# Patient Record
Sex: MALE | Race: WHITE | Employment: OTHER | URBAN - METROPOLITAN AREA
[De-identification: names, ages, dates, MRNs, and addresses within clinical notes are randomized per-mention and may not be internally consistent; named-entity substitution may affect disease eponyms.]

---

## 2017-01-10 ENCOUNTER — OFFICE VISIT (OUTPATIENT)
Dept: CARDIOLOGY CLINIC | Facility: CLINIC | Age: 72
End: 2017-01-10

## 2017-01-10 VITALS
HEART RATE: 80 BPM | HEIGHT: 70 IN | BODY MASS INDEX: 24.2 KG/M2 | SYSTOLIC BLOOD PRESSURE: 124 MMHG | WEIGHT: 169 LBS | DIASTOLIC BLOOD PRESSURE: 70 MMHG

## 2017-01-10 DIAGNOSIS — E78.00 HYPERCHOLESTEREMIA: ICD-10-CM

## 2017-01-10 DIAGNOSIS — I10 ESSENTIAL HYPERTENSION: ICD-10-CM

## 2017-01-10 DIAGNOSIS — I25.10 CORONARY ARTERY DISEASE INVOLVING NATIVE CORONARY ARTERY OF NATIVE HEART WITHOUT ANGINA PECTORIS: Primary | ICD-10-CM

## 2017-01-10 PROCEDURE — 99214 OFFICE O/P EST MOD 30 MIN: CPT | Performed by: INTERNAL MEDICINE

## 2017-01-10 PROCEDURE — G0463 HOSPITAL OUTPT CLINIC VISIT: HCPCS | Performed by: INTERNAL MEDICINE

## 2017-01-10 RX ORDER — METOPROLOL SUCCINATE 25 MG/1
25 TABLET, EXTENDED RELEASE ORAL DAILY
Qty: 30 TABLET | Refills: 11 | Status: SHIPPED | OUTPATIENT
Start: 2017-01-10 | End: 2018-01-28

## 2017-01-10 RX ORDER — RAMIPRIL 5 MG/1
5 CAPSULE ORAL DAILY
Qty: 30 CAPSULE | Refills: 11 | Status: SHIPPED | OUTPATIENT
Start: 2017-01-10 | End: 2018-01-28

## 2017-01-10 RX ORDER — FENOFIBRATE 160 MG/1
160 TABLET ORAL DAILY
Qty: 30 TABLET | Refills: 11 | Status: SHIPPED | OUTPATIENT
Start: 2017-01-10 | End: 2018-01-28

## 2017-01-10 RX ORDER — ATORVASTATIN CALCIUM 80 MG/1
80 TABLET, FILM COATED ORAL DAILY
Qty: 30 TABLET | Refills: 11 | Status: SHIPPED | OUTPATIENT
Start: 2017-01-10 | End: 2018-01-28

## 2017-01-10 NOTE — PATIENT INSTRUCTIONS
Stress echocardiogram in April or May  Fasting cholesterol blood test October  Continue same medications

## 2017-01-10 NOTE — PROGRESS NOTES
Carlene Sanchez is a 80year old male. Patient presents with: Follow - Up: Yearly    HPI:   This is a pleasant 73-year-old with coronary disease and prior bypass in 2008 known hypertension and elevated cholesterol.   He presents for follow-up and feels wel feels well otherwise  SKIN: denies any unusual skin lesions or rashes  RESPIRATORY: denies shortness of breath with exertion  CARDIOVASCULAR:See HPI  GI: denies abdominal pain and denies heartburn  NEURO: denies headaches  Remainder of review of systems is MD  1/10/2017  12:04 PM

## 2017-05-03 ENCOUNTER — HOSPITAL ENCOUNTER (OUTPATIENT)
Dept: CV DIAGNOSTICS | Facility: HOSPITAL | Age: 72
Discharge: HOME | End: 2017-05-03
Attending: INTERNAL MEDICINE
Payer: MEDICARE

## 2017-05-03 DIAGNOSIS — I25.10 CORONARY ARTERY DISEASE INVOLVING NATIVE CORONARY ARTERY OF NATIVE HEART WITHOUT ANGINA PECTORIS: ICD-10-CM

## 2017-05-03 PROCEDURE — 93016 CV STRESS TEST SUPVJ ONLY: CPT | Performed by: INTERNAL MEDICINE

## 2017-05-03 PROCEDURE — 93018 CV STRESS TEST I&R ONLY: CPT | Performed by: INTERNAL MEDICINE

## 2017-05-03 PROCEDURE — 93350 STRESS TTE ONLY: CPT

## 2017-05-03 PROCEDURE — 93350 STRESS TTE ONLY: CPT | Performed by: INTERNAL MEDICINE

## 2017-05-03 PROCEDURE — 93017 CV STRESS TEST TRACING ONLY: CPT

## 2018-01-29 RX ORDER — METOPROLOL SUCCINATE 25 MG/1
TABLET, EXTENDED RELEASE ORAL
Qty: 30 TABLET | Refills: 10 | Status: SHIPPED | OUTPATIENT
Start: 2018-01-29 | End: 2018-10-24

## 2018-01-29 RX ORDER — FENOFIBRATE 160 MG/1
TABLET ORAL
Qty: 30 TABLET | Refills: 10 | Status: SHIPPED | OUTPATIENT
Start: 2018-01-29 | End: 2018-10-24

## 2018-01-29 RX ORDER — RAMIPRIL 5 MG/1
CAPSULE ORAL
Qty: 30 CAPSULE | Refills: 10 | Status: SHIPPED | OUTPATIENT
Start: 2018-01-29 | End: 2018-10-24

## 2018-01-29 RX ORDER — ATORVASTATIN CALCIUM 80 MG/1
TABLET, FILM COATED ORAL
Qty: 30 TABLET | Refills: 10 | Status: SHIPPED | OUTPATIENT
Start: 2018-01-29 | End: 2018-10-24

## 2018-02-27 ENCOUNTER — OFFICE VISIT (OUTPATIENT)
Dept: CARDIOLOGY CLINIC | Facility: CLINIC | Age: 73
End: 2018-02-27

## 2018-02-27 VITALS
HEART RATE: 68 BPM | RESPIRATION RATE: 12 BRPM | BODY MASS INDEX: 24 KG/M2 | DIASTOLIC BLOOD PRESSURE: 58 MMHG | WEIGHT: 165 LBS | SYSTOLIC BLOOD PRESSURE: 100 MMHG

## 2018-02-27 DIAGNOSIS — I25.10 CORONARY ARTERY DISEASE INVOLVING NATIVE CORONARY ARTERY OF NATIVE HEART WITHOUT ANGINA PECTORIS: ICD-10-CM

## 2018-02-27 DIAGNOSIS — E78.00 HYPERCHOLESTEREMIA: Primary | ICD-10-CM

## 2018-02-27 DIAGNOSIS — I10 ESSENTIAL HYPERTENSION: ICD-10-CM

## 2018-02-27 PROCEDURE — G0463 HOSPITAL OUTPT CLINIC VISIT: HCPCS | Performed by: INTERNAL MEDICINE

## 2018-02-27 PROCEDURE — 99214 OFFICE O/P EST MOD 30 MIN: CPT | Performed by: INTERNAL MEDICINE

## 2018-02-27 NOTE — PROGRESS NOTES
Alisa Maloney is a 71year old male. Patient presents with:  Prior Authorization    HPI:   This is a pleasant 67-year-old with coronary disease known bypass in 2008 with hypertension and elevated cholesterol who presents for follow-up.   He is feeling wel OF SYSTEMS:   GENERAL HEALTH: feels well otherwise  SKIN: denies any unusual skin lesions or rashes  RESPIRATORY: denies shortness of breath with exertion  CARDIOVASCULAR:See HPI  GI: denies abdominal pain and denies heartburn  NEURO: denies headaches  Rem

## 2018-03-05 ENCOUNTER — OFFICE VISIT (OUTPATIENT)
Dept: FAMILY MEDICINE CLINIC | Facility: CLINIC | Age: 73
End: 2018-03-05

## 2018-03-05 VITALS
WEIGHT: 165 LBS | DIASTOLIC BLOOD PRESSURE: 85 MMHG | HEIGHT: 70 IN | BODY MASS INDEX: 23.62 KG/M2 | TEMPERATURE: 98 F | HEART RATE: 70 BPM | SYSTOLIC BLOOD PRESSURE: 147 MMHG

## 2018-03-05 DIAGNOSIS — IMO0001 ALCOHOLISM /ALCOHOL ABUSE: Primary | ICD-10-CM

## 2018-03-05 PROCEDURE — 99214 OFFICE O/P EST MOD 30 MIN: CPT | Performed by: FAMILY MEDICINE

## 2018-03-05 PROCEDURE — G0463 HOSPITAL OUTPT CLINIC VISIT: HCPCS | Performed by: FAMILY MEDICINE

## 2018-03-05 NOTE — PROGRESS NOTES
Patient ID: Antony Ocampo is a 71year old male. HPI  Patient presents with:  Alcohol Problem    Just saw Dr. Radha Belcher the cardiologist in 2/27/2018. In 2008 he had a bypass as he has high blood pressure and high cholesterol.   He has labs ordered b kg)  06/27/16 : 165 lb (74.8 kg)      Body mass index is 23.68 kg/m².     BP Readings from Last 6 Encounters:  03/05/18 : 147/85  02/27/18 : 100/58  01/10/17 : 124/70  08/16/16 : 138/84  07/11/16 : 136/84  06/27/16 : 128/83        Review of Systems      Pas Normal rate, regular rhythm and normal heart sounds. Pulmonary/Chest: Effort normal and breath sounds normal. No respiratory distress. Abdominal: Normal appearance and bowel sounds are normal. There is    no tenderness.   There is no rigidity, no rebou

## 2018-03-07 ENCOUNTER — LAB ENCOUNTER (OUTPATIENT)
Dept: LAB | Age: 73
End: 2018-03-07
Attending: INTERNAL MEDICINE
Payer: MEDICARE

## 2018-03-07 DIAGNOSIS — E78.00 HYPERCHOLESTEREMIA: ICD-10-CM

## 2018-03-07 DIAGNOSIS — I10 ESSENTIAL HYPERTENSION: ICD-10-CM

## 2018-03-07 DIAGNOSIS — IMO0001 ALCOHOLISM /ALCOHOL ABUSE: ICD-10-CM

## 2018-03-07 DIAGNOSIS — I25.10 CORONARY ARTERY DISEASE INVOLVING NATIVE CORONARY ARTERY OF NATIVE HEART WITHOUT ANGINA PECTORIS: ICD-10-CM

## 2018-03-07 LAB
ALBUMIN SERPL BCP-MCNC: 4.1 G/DL (ref 3.5–4.8)
ALBUMIN/GLOB SERPL: 1.8 {RATIO} (ref 1–2)
ALP SERPL-CCNC: 63 U/L (ref 32–100)
ALT SERPL-CCNC: 37 U/L (ref 17–63)
ANION GAP SERPL CALC-SCNC: 9 MMOL/L (ref 0–18)
AST SERPL-CCNC: 36 U/L (ref 15–41)
BASOPHILS # BLD: 0.1 K/UL (ref 0–0.2)
BASOPHILS NFR BLD: 1 %
BILIRUB SERPL-MCNC: 1 MG/DL (ref 0.3–1.2)
BUN SERPL-MCNC: 11 MG/DL (ref 8–20)
BUN/CREAT SERPL: 11.6 (ref 10–20)
CALCIUM SERPL-MCNC: 9.1 MG/DL (ref 8.5–10.5)
CHLORIDE SERPL-SCNC: 100 MMOL/L (ref 95–110)
CHOLEST SERPL-MCNC: 192 MG/DL (ref 110–200)
CO2 SERPL-SCNC: 26 MMOL/L (ref 22–32)
CREAT SERPL-MCNC: 0.95 MG/DL (ref 0.5–1.5)
EOSINOPHIL # BLD: 0.1 K/UL (ref 0–0.7)
EOSINOPHIL NFR BLD: 1 %
ERYTHROCYTE [DISTWIDTH] IN BLOOD BY AUTOMATED COUNT: 12.5 % (ref 11–15)
FOLATE SERPL-MCNC: 13.6 NG/ML
GGT SERPL-CCNC: 65 U/L (ref 7–50)
GLOBULIN PLAS-MCNC: 2.3 G/DL (ref 2.5–3.7)
GLUCOSE SERPL-MCNC: 123 MG/DL (ref 70–99)
HCT VFR BLD AUTO: 42 % (ref 41–52)
HDLC SERPL-MCNC: 62 MG/DL
HGB BLD-MCNC: 14.4 G/DL (ref 13.5–17.5)
LDLC SERPL CALC-MCNC: 110 MG/DL (ref 0–99)
LYMPHOCYTES # BLD: 1.9 K/UL (ref 1–4)
LYMPHOCYTES NFR BLD: 26 %
MCH RBC QN AUTO: 31.6 PG (ref 27–32)
MCHC RBC AUTO-ENTMCNC: 34.3 G/DL (ref 32–37)
MCV RBC AUTO: 92.1 FL (ref 80–100)
MONOCYTES # BLD: 0.7 K/UL (ref 0–1)
MONOCYTES NFR BLD: 9 %
NEUTROPHILS # BLD AUTO: 4.6 K/UL (ref 1.8–7.7)
NEUTROPHILS NFR BLD: 63 %
NONHDLC SERPL-MCNC: 130 MG/DL
OSMOLALITY UR CALC.SUM OF ELEC: 281 MOSM/KG (ref 275–295)
PATIENT FASTING: YES
PLATELET # BLD AUTO: 380 K/UL (ref 140–400)
PMV BLD AUTO: 8.3 FL (ref 7.4–10.3)
POTASSIUM SERPL-SCNC: 4.2 MMOL/L (ref 3.3–5.1)
PROT SERPL-MCNC: 6.4 G/DL (ref 5.9–8.4)
RBC # BLD AUTO: 4.56 M/UL (ref 4.5–5.9)
SODIUM SERPL-SCNC: 135 MMOL/L (ref 136–144)
TRIGL SERPL-MCNC: 99 MG/DL (ref 1–149)
VIT B12 SERPL-MCNC: 620 PG/ML (ref 181–914)
WBC # BLD AUTO: 7.4 K/UL (ref 4–11)

## 2018-03-07 PROCEDURE — 82607 VITAMIN B-12: CPT

## 2018-03-07 PROCEDURE — 82977 ASSAY OF GGT: CPT

## 2018-03-07 PROCEDURE — 36415 COLL VENOUS BLD VENIPUNCTURE: CPT

## 2018-03-07 PROCEDURE — 82746 ASSAY OF FOLIC ACID SERUM: CPT

## 2018-03-07 PROCEDURE — 84425 ASSAY OF VITAMIN B-1: CPT

## 2018-03-07 PROCEDURE — 80061 LIPID PANEL: CPT

## 2018-03-07 PROCEDURE — 85025 COMPLETE CBC W/AUTO DIFF WBC: CPT

## 2018-03-07 PROCEDURE — 80053 COMPREHEN METABOLIC PANEL: CPT

## 2018-03-08 ENCOUNTER — TELEPHONE (OUTPATIENT)
Dept: CARDIOLOGY CLINIC | Facility: CLINIC | Age: 73
End: 2018-03-08

## 2018-03-08 DIAGNOSIS — I25.10 ATHEROSCLEROSIS OF NATIVE CORONARY ARTERY OF NATIVE HEART WITHOUT ANGINA PECTORIS: Primary | ICD-10-CM

## 2018-03-08 NOTE — TELEPHONE ENCOUNTER
Called patient to inform him to work on diet & exercise and retest lipids in 2 mo.  See lab result note    [facility]

## 2018-03-11 LAB — VITAMIN B1 (THIAMINE), WHOLE B: 116 NMOL/L

## 2018-05-01 ENCOUNTER — HOSPITAL ENCOUNTER (OUTPATIENT)
Dept: GENERAL RADIOLOGY | Age: 73
Discharge: HOME | End: 2018-05-01
Attending: FAMILY MEDICINE | Admitting: FAMILY MEDICINE
Payer: MEDICARE

## 2018-05-01 ENCOUNTER — OFFICE VISIT (OUTPATIENT)
Dept: FAMILY MEDICINE CLINIC | Facility: CLINIC | Age: 73
End: 2018-05-01

## 2018-05-01 VITALS
HEIGHT: 70 IN | BODY MASS INDEX: 23.62 KG/M2 | TEMPERATURE: 98 F | WEIGHT: 165 LBS | DIASTOLIC BLOOD PRESSURE: 87 MMHG | HEART RATE: 85 BPM | SYSTOLIC BLOOD PRESSURE: 156 MMHG

## 2018-05-01 DIAGNOSIS — Z18.33: Primary | ICD-10-CM

## 2018-05-01 DIAGNOSIS — I89.1 LYMPHANGITIS: ICD-10-CM

## 2018-05-01 DIAGNOSIS — Z18.33: ICD-10-CM

## 2018-05-01 PROCEDURE — 99214 OFFICE O/P EST MOD 30 MIN: CPT | Performed by: FAMILY MEDICINE

## 2018-05-01 PROCEDURE — 73130 X-RAY EXAM OF HAND: CPT | Performed by: FAMILY MEDICINE

## 2018-05-01 PROCEDURE — G0463 HOSPITAL OUTPT CLINIC VISIT: HCPCS | Performed by: FAMILY MEDICINE

## 2018-05-01 RX ORDER — NALTREXONE HYDROCHLORIDE 50 MG/1
TABLET, FILM COATED ORAL
Refills: 0 | COMMUNITY
Start: 2018-04-11 | End: 2018-08-21

## 2018-05-01 RX ORDER — CEPHALEXIN 500 MG/1
500 CAPSULE ORAL 3 TIMES DAILY
Qty: 30 CAPSULE | Refills: 0 | Status: SHIPPED | OUTPATIENT
Start: 2018-05-01 | End: 2018-05-11

## 2018-05-01 RX ORDER — SULFAMETHOXAZOLE AND TRIMETHOPRIM 800; 160 MG/1; MG/1
1 TABLET ORAL 2 TIMES DAILY
Qty: 20 TABLET | Refills: 0 | Status: SHIPPED | OUTPATIENT
Start: 2018-05-01 | End: 2018-05-11

## 2018-05-01 NOTE — PROGRESS NOTES
5/1/2018  11:02 AM    Bárbara Alejandra is a 71year old male. Chief complaint(s): Patient presents with:  Foreign Body: possible splint to right hand, painful, red and swelling    HPI:     Bárbara Alejandra primary complaint is regarding as above.      Pa 03/05/2013      Pneumovax 23          03/12/2014      Medications (Active prior to today's visit):    Current Outpatient Prescriptions:  Sulfamethoxazole-TMP -160 MG Oral Tab per tablet Take 1 tablet by mouth 2 (two) times daily.  Disp: Mouth/Throat: Oropharynx is clear and moist and mucous membranes are normal.   Eyes: Conjunctivae are normal. No scleral icterus. Neck: Neck supple. Cardiovascular: Normal rate and regular rhythm.     Pulmonary/Chest:   Clear to ascultation bilaterall capsule (500 mg total) by mouth 3 (three) times daily.            Imaging & Referrals:  Cj Jean MD

## 2018-08-13 ENCOUNTER — NURSE TRIAGE (OUTPATIENT)
Dept: OTHER | Age: 73
End: 2018-08-13

## 2018-08-13 NOTE — TELEPHONE ENCOUNTER
Action Requested: Summary for Provider     []  Critical Lab, Recommendations Needed  [] Need Additional Advice  []   FYI    []   Need Orders  [] Need Medications Sent to Pharmacy  []  Other     SUMMARY: OV scheduled with Dr Bárbara Nj 8/14/18 for further evalu

## 2018-08-14 ENCOUNTER — OFFICE VISIT (OUTPATIENT)
Dept: FAMILY MEDICINE CLINIC | Facility: CLINIC | Age: 73
End: 2018-08-14
Payer: MEDICARE

## 2018-08-14 VITALS
TEMPERATURE: 99 F | WEIGHT: 163 LBS | DIASTOLIC BLOOD PRESSURE: 68 MMHG | BODY MASS INDEX: 23.34 KG/M2 | HEIGHT: 70 IN | SYSTOLIC BLOOD PRESSURE: 106 MMHG | HEART RATE: 83 BPM

## 2018-08-14 DIAGNOSIS — R39.198 SLOW URINARY STREAM: ICD-10-CM

## 2018-08-14 DIAGNOSIS — R97.20 ELEVATED PSA: ICD-10-CM

## 2018-08-14 DIAGNOSIS — R34 DECREASED URINE VOLUME: Primary | ICD-10-CM

## 2018-08-14 PROCEDURE — 99214 OFFICE O/P EST MOD 30 MIN: CPT | Performed by: FAMILY MEDICINE

## 2018-08-14 PROCEDURE — G0463 HOSPITAL OUTPT CLINIC VISIT: HCPCS | Performed by: FAMILY MEDICINE

## 2018-08-14 NOTE — PROGRESS NOTES
Patient ID: Shaila Alvarado is a 68year old male.     HPI  Patient presents with:  Slow Urine Flow    Action Requested: Summary for Provider     []  Critical Lab, Recommendations Needed  [] Need Additional Advice  []   FYI    []   Need Orders  [] Need Me 156/87  03/05/18 : 147/85  02/27/18 : 100/58  01/10/17 : 124/70  08/16/16 : 138/84        Review of Systems   Constitutional: Negative for fever. Gastrointestinal: Negative for nausea and vomiting. Genitourinary: Positive for frequency.  Negative for [facility] distress. Abdominal: Normal appearance and bowel sounds are normal. There is    no tenderness. No bladder dilatation on palpation. Neurological: Patient is alert and oriented to person, place, and time. Skin: Skin is warm and dry.    Psychiatric: Charly Barrientos

## 2018-08-15 ENCOUNTER — TELEPHONE (OUTPATIENT)
Dept: OTHER | Age: 73
End: 2018-08-15

## 2018-08-15 ENCOUNTER — LAB ENCOUNTER (OUTPATIENT)
Dept: LAB | Age: 73
End: 2018-08-15
Attending: FAMILY MEDICINE
Payer: MEDICARE

## 2018-08-15 DIAGNOSIS — I25.10 ATHEROSCLEROSIS OF NATIVE CORONARY ARTERY OF NATIVE HEART WITHOUT ANGINA PECTORIS: ICD-10-CM

## 2018-08-15 DIAGNOSIS — R97.20 ELEVATED PSA: ICD-10-CM

## 2018-08-15 DIAGNOSIS — N30.00 ACUTE CYSTITIS WITHOUT HEMATURIA: Primary | ICD-10-CM

## 2018-08-15 DIAGNOSIS — R34 DECREASED URINE VOLUME: ICD-10-CM

## 2018-08-15 DIAGNOSIS — R39.198 SLOW URINARY STREAM: ICD-10-CM

## 2018-08-15 LAB
ALT SERPL-CCNC: 28 U/L (ref 17–63)
AST SERPL-CCNC: 36 U/L (ref 15–41)
BILIRUB UR QL: NEGATIVE
CHOLEST SERPL-MCNC: 127 MG/DL (ref 110–200)
COLOR UR: YELLOW
GLUCOSE UR-MCNC: NEGATIVE MG/DL
HDLC SERPL-MCNC: 50 MG/DL
KETONES UR-MCNC: NEGATIVE MG/DL
LDLC SERPL CALC-MCNC: 61 MG/DL (ref 0–99)
NITRITE UR QL STRIP.AUTO: NEGATIVE
NONHDLC SERPL-MCNC: 77 MG/DL
PH UR: 5 [PH] (ref 5–8)
PROT UR-MCNC: 100 MG/DL
PSA SERPL-MCNC: 1 NG/ML (ref 0–4)
RBC #/AREA URNS AUTO: 0 /HPF
SP GR UR STRIP: 1.01 (ref 1–1.03)
TRIGL SERPL-MCNC: 79 MG/DL (ref 1–149)
UROBILINOGEN UR STRIP-ACNC: <2
VIT C UR-MCNC: NEGATIVE MG/DL
WBC #/AREA URNS AUTO: 3099 /HPF

## 2018-08-15 PROCEDURE — 84450 TRANSFERASE (AST) (SGOT): CPT

## 2018-08-15 PROCEDURE — 80061 LIPID PANEL: CPT

## 2018-08-15 PROCEDURE — 84460 ALANINE AMINO (ALT) (SGPT): CPT

## 2018-08-15 PROCEDURE — 87086 URINE CULTURE/COLONY COUNT: CPT

## 2018-08-15 PROCEDURE — 84153 ASSAY OF PSA TOTAL: CPT

## 2018-08-15 PROCEDURE — 81001 URINALYSIS AUTO W/SCOPE: CPT

## 2018-08-15 PROCEDURE — 36415 COLL VENOUS BLD VENIPUNCTURE: CPT

## 2018-08-15 RX ORDER — CIPROFLOXACIN 500 MG/1
500 TABLET, FILM COATED ORAL 2 TIMES DAILY
Qty: 20 TABLET | Refills: 0 | Status: SHIPPED | OUTPATIENT
Start: 2018-08-15 | End: 2018-08-25

## 2018-08-15 NOTE — TELEPHONE ENCOUNTER
Pt was inform of  message below and he verbalized understanding.     Notes recorded by Dory Horn DO on 8/15/2018 at 2:27 PM CDT  You have a urinary tract infection.  I will put you on 10 days of antibiotics.  Let us do Cipro twice daily for 10 da

## 2018-08-21 ENCOUNTER — OFFICE VISIT (OUTPATIENT)
Dept: SURGERY | Facility: CLINIC | Age: 73
End: 2018-08-21
Payer: MEDICARE

## 2018-08-21 VITALS
WEIGHT: 162 LBS | DIASTOLIC BLOOD PRESSURE: 70 MMHG | HEIGHT: 70 IN | HEART RATE: 70 BPM | SYSTOLIC BLOOD PRESSURE: 118 MMHG | BODY MASS INDEX: 23.19 KG/M2 | TEMPERATURE: 98 F

## 2018-08-21 DIAGNOSIS — K40.90 RIGHT INGUINAL HERNIA: ICD-10-CM

## 2018-08-21 DIAGNOSIS — N39.0 UTI (URINARY TRACT INFECTION) WITH PYURIA: Primary | ICD-10-CM

## 2018-08-21 PROCEDURE — 99204 OFFICE O/P NEW MOD 45 MIN: CPT | Performed by: UROLOGY

## 2018-08-21 PROCEDURE — G0463 HOSPITAL OUTPT CLINIC VISIT: HCPCS | Performed by: UROLOGY

## 2018-08-21 NOTE — PROGRESS NOTES
[facility] Urology  Initial Office Consultation    HPI:   Shannon Barnes is a 68year old male here today in consultation for recent lower urinary tract symptoms. He was referred by his PCP Dr. Kathy Ozuna.     Patient was initially seen on 8/14/2018 by Past Surgical History:   Procedure Laterality Date   • Cabg  2007    per NG     Family History   Problem Relation Age of Onset   • Lipids Father      Hyperlipidemia; per NG   • Heart Disease Father      per NG   • Diabetes Maternal Grandfather      per Neurological: Negative for light-headedness and headaches.        EXAM:  /70 (BP Location: Right arm, Patient Position: Sitting, Cuff Size: adult)   Pulse 70   Temp 98.2 °F (36.8 °C) (Oral)   Ht 5' 10\" (1.778 m)   Wt 162 lb (73.5 kg)   BMI 23.24 kg resolved symptomatic UTI (despite urine culture showing no growth). He was treated with a 10 day course of p.o. Cipro twice daily. His symptoms have completely resolved and he is back to his normal baseline. I explained to Mr. Keith that given this

## 2018-10-25 RX ORDER — RAMIPRIL 5 MG/1
CAPSULE ORAL
Qty: 90 CAPSULE | Refills: 1 | Status: SHIPPED | OUTPATIENT
Start: 2018-10-25 | End: 2019-03-18

## 2018-10-25 RX ORDER — ATORVASTATIN CALCIUM 80 MG/1
TABLET, FILM COATED ORAL
Qty: 90 TABLET | Refills: 1 | Status: SHIPPED | OUTPATIENT
Start: 2018-10-25 | End: 2019-03-18

## 2018-10-25 RX ORDER — FENOFIBRATE 160 MG/1
TABLET ORAL
Qty: 90 TABLET | Refills: 1 | Status: SHIPPED | OUTPATIENT
Start: 2018-10-25 | End: 2019-03-18

## 2018-10-25 RX ORDER — METOPROLOL SUCCINATE 25 MG/1
TABLET, EXTENDED RELEASE ORAL
Qty: 90 TABLET | Refills: 1 | Status: SHIPPED | OUTPATIENT
Start: 2018-10-25 | End: 2019-03-18

## 2018-10-25 NOTE — TELEPHONE ENCOUNTER
Re: atorvastatin, metoprolol, fenofibrate, ramipril  LOV reviewed. No change in thiese medications. Meets refill protocol criteria. Refill sent.   Cholesterol Medications  Protocol Criteria:  · Appointment scheduled with Cardiology in the past 12 months or Ava Corona,     Office Visit    8 months ago [facility] Cardiology Nohemi Haider MD    Office Visit          Lab Results   Component Value Date     (H) 03/07/2018    BUN 11 03/07/2018    CREATSERUM 0.95 03/07/2

## 2019-01-04 ENCOUNTER — OFFICE VISIT (OUTPATIENT)
Dept: FAMILY MEDICINE CLINIC | Facility: CLINIC | Age: 74
End: 2019-01-04
Payer: MEDICARE

## 2019-01-04 VITALS
TEMPERATURE: 97 F | DIASTOLIC BLOOD PRESSURE: 69 MMHG | HEART RATE: 66 BPM | BODY MASS INDEX: 23.79 KG/M2 | SYSTOLIC BLOOD PRESSURE: 109 MMHG | HEIGHT: 70 IN | WEIGHT: 166.19 LBS

## 2019-01-04 DIAGNOSIS — IMO0001 ALCOHOLISM /ALCOHOL ABUSE: Primary | ICD-10-CM

## 2019-01-04 PROCEDURE — 99214 OFFICE O/P EST MOD 30 MIN: CPT | Performed by: FAMILY MEDICINE

## 2019-01-04 PROCEDURE — G0463 HOSPITAL OUTPT CLINIC VISIT: HCPCS | Performed by: FAMILY MEDICINE

## 2019-01-04 NOTE — PROGRESS NOTES
Patient ID: Xochitl Schultz is a 68year old male. HPI  Patient presents with:  Alcohol Problem    I see him in March 2018.   At that time he states he was escalating his alcohol drinking 6-7 drinks over many hours but he knows this is become excessive History of chickenpox     per NG   • History of fracture of arm     per NG   • History of measles     per NG   • Hx of tonsillitis 1950    per NG   • Other and unspecified hyperlipidemia     per NG       Past Surgical History:   Procedure Laterality Date with them letting them know that he is coming. He will talk to Duane Cockayne today and hopefully get the help that is needed. Referrals (if applicable)  No orders of the defined types were placed in this encounter.         Follow up if symptoms persist.

## 2019-01-22 PROBLEM — F10.20 ALCOHOL USE DISORDER, SEVERE, DEPENDENCE (HCC): Status: ACTIVE | Noted: 2019-01-22

## 2019-01-23 PROBLEM — F10.20 ALCOHOL USE DISORDER, MODERATE, DEPENDENCE (HCC): Status: ACTIVE | Noted: 2019-01-22

## 2019-01-29 NOTE — PROGRESS NOTES
Patient ID: Cecilia Moya is a 68year old male. HPI  Patient presents with:   Follow - Up: alcohol detox  Hearing Problem    He states he was admitted to [facility] due to alcohol abuse and he was think he was going to go into withdrawal but he did Systems      Past Medical History:   Diagnosis Date   • Carotid artery disease ([facility] [address]     per NG   • Coronary artery disease     CABG; per NG   • Essential hypertension    • Gastrointestinal complaints, nonspecific     Bowel problems; per NG   • History of Lymphadenopathy:     Has  no cervical adenopathy. Cardiovascular: Normal rate, regular rhythm and no murmur heard. Pulmonary/Chest: Effort normal and breath sounds normal. No respiratory distress.    Neurological: Patient is alert and oriented to [facility]

## 2019-02-05 ENCOUNTER — OFFICE VISIT (OUTPATIENT)
Dept: OTOLARYNGOLOGY | Facility: CLINIC | Age: 74
End: 2019-02-05
Payer: MEDICARE

## 2019-02-05 VITALS
WEIGHT: 163 LBS | TEMPERATURE: 99 F | SYSTOLIC BLOOD PRESSURE: 157 MMHG | BODY MASS INDEX: 23.34 KG/M2 | DIASTOLIC BLOOD PRESSURE: 97 MMHG | HEIGHT: 70 IN

## 2019-02-05 DIAGNOSIS — H61.23 BILATERAL IMPACTED CERUMEN: Primary | ICD-10-CM

## 2019-02-05 PROCEDURE — G0463 HOSPITAL OUTPT CLINIC VISIT: HCPCS | Performed by: OTOLARYNGOLOGY

## 2019-02-05 PROCEDURE — 99202 OFFICE O/P NEW SF 15 MIN: CPT | Performed by: OTOLARYNGOLOGY

## 2019-02-25 ENCOUNTER — OFFICE VISIT (OUTPATIENT)
Dept: AUDIOLOGY | Facility: CLINIC | Age: 74
End: 2019-02-25
Payer: MEDICARE

## 2019-02-25 ENCOUNTER — TELEPHONE (OUTPATIENT)
Dept: OTOLARYNGOLOGY | Facility: CLINIC | Age: 74
End: 2019-02-25

## 2019-02-25 DIAGNOSIS — H91.90 HEARING LOSS, UNSPECIFIED HEARING LOSS TYPE, UNSPECIFIED LATERALITY: Primary | ICD-10-CM

## 2019-02-25 DIAGNOSIS — H90.3 SENSORINEURAL HEARING LOSS, BILATERAL: Primary | ICD-10-CM

## 2019-02-25 PROCEDURE — 92557 COMPREHENSIVE HEARING TEST: CPT | Performed by: AUDIOLOGIST

## 2019-02-25 PROCEDURE — 92591 HEARING AID EXAM, BOTH EARS: CPT | Performed by: AUDIOLOGIST

## 2019-02-25 PROCEDURE — 92567 TYMPANOMETRY: CPT | Performed by: AUDIOLOGIST

## 2019-02-25 NOTE — PROGRESS NOTES
Jeanine Perez was referred for testing by Dr. Suzi Monahan  [birth year]  [id number]      History    Patient was last seen for an audiological evaluation on 7/30/2014. At that time, it was recommended that he pursue binaural hearing aids.   He management.       Discussed listening strategies and gave list of aural rehab options  Discussed that hearing aid is not able to fix all problems in noise, especially speech noise as well as problems with fast speech or in environments with poor signal to

## 2019-03-11 ENCOUNTER — OFFICE VISIT (OUTPATIENT)
Dept: AUDIOLOGY | Facility: CLINIC | Age: 74
End: 2019-03-11

## 2019-03-11 DIAGNOSIS — H90.3 SENSORINEURAL HEARING LOSS, BILATERAL: Primary | ICD-10-CM

## 2019-03-11 PROCEDURE — V5259 HEARING AID, DIGIT, BIN, ITC: HCPCS | Performed by: AUDIOLOGIST

## 2019-03-11 NOTE — PROGRESS NOTES
Hearing Aid [address] [facility] purchased two hearing aids. The hearing aid fitting was completed by Garfield Multani       Hearing Aid Information       Right    Left   Make: Oticon Make: Oticon   Model: OPN 1 ITC Model: OPN 1  ITC   Serial Nu

## 2019-03-19 NOTE — TELEPHONE ENCOUNTER
Atorvastatin  Fenofibrate  Metoprolol succinate  Ramipril    LOV 2/27/18 no change in these medications  Overdue for office visit other wise meets protocols  Hypertensive Medications  Protocol Criteria:  · Appointment scheduled with Cardiology in the past Medications  Protocol Criteria:  · Appointment scheduled with Cardiology in the past 12 months or in the next 3 months  · ALT & LDL on file in the past 12 months  · ALT result less than 80  · LDL result less than 130   Recent Outpatient Visits            1

## 2019-03-20 RX ORDER — METOPROLOL SUCCINATE 25 MG/1
25 TABLET, EXTENDED RELEASE ORAL
Qty: 90 TABLET | Refills: 1 | Status: SHIPPED | OUTPATIENT
Start: 2019-03-20 | End: 2019-07-04

## 2019-03-20 RX ORDER — FENOFIBRATE 160 MG/1
160 TABLET ORAL
Qty: 90 TABLET | Refills: 1 | Status: SHIPPED | OUTPATIENT
Start: 2019-03-20 | End: 2019-07-04

## 2019-03-20 RX ORDER — ATORVASTATIN CALCIUM 80 MG/1
80 TABLET, FILM COATED ORAL
Qty: 90 TABLET | Refills: 1 | Status: SHIPPED | OUTPATIENT
Start: 2019-03-20 | End: 2019-07-04

## 2019-03-20 RX ORDER — RAMIPRIL 5 MG/1
5 CAPSULE ORAL
Qty: 90 CAPSULE | Refills: 1 | Status: SHIPPED | OUTPATIENT
Start: 2019-03-20 | End: 2019-05-06

## 2019-03-26 ENCOUNTER — OFFICE VISIT (OUTPATIENT)
Dept: AUDIOLOGY | Facility: CLINIC | Age: 74
End: 2019-03-26
Payer: MEDICARE

## 2019-03-26 DIAGNOSIS — H90.3 SENSORINEURAL HEARING LOSS, BILATERAL: Primary | ICD-10-CM

## 2019-03-26 PROCEDURE — 92593 HEARING AID CHECK, BOTH EARS: CPT | Performed by: AUDIOLOGIST

## 2019-03-26 NOTE — PROGRESS NOTES
HEARING AID FOLLOW-UP    Elliott   7/31/1945  [id number]        Hearing Aid Information       Right    Left   Make: Oticon Make: Oticon   Model: OPN 1 ITC Model: OPN 1  ITC   Serial Number: [id number] Serial Number: [id number]   Date of Purchase: 03/11

## 2019-04-16 ENCOUNTER — OFFICE VISIT (OUTPATIENT)
Dept: CARDIOLOGY CLINIC | Facility: CLINIC | Age: 74
End: 2019-04-16
Payer: MEDICARE

## 2019-04-16 VITALS
DIASTOLIC BLOOD PRESSURE: 61 MMHG | WEIGHT: 170 LBS | BODY MASS INDEX: 24 KG/M2 | HEART RATE: 81 BPM | OXYGEN SATURATION: 94 % | SYSTOLIC BLOOD PRESSURE: 91 MMHG | RESPIRATION RATE: 20 BRPM

## 2019-04-16 DIAGNOSIS — I10 ESSENTIAL HYPERTENSION: ICD-10-CM

## 2019-04-16 DIAGNOSIS — E78.00 HYPERCHOLESTEREMIA: ICD-10-CM

## 2019-04-16 DIAGNOSIS — I25.10 ATHEROSCLEROSIS OF NATIVE CORONARY ARTERY OF NATIVE HEART WITHOUT ANGINA PECTORIS: Primary | ICD-10-CM

## 2019-04-16 PROCEDURE — 99214 OFFICE O/P EST MOD 30 MIN: CPT | Performed by: INTERNAL MEDICINE

## 2019-04-16 PROCEDURE — G0463 HOSPITAL OUTPT CLINIC VISIT: HCPCS | Performed by: INTERNAL MEDICINE

## 2019-04-16 NOTE — PROGRESS NOTES
[facility] CLINIC  PROGRESS NOTE    Clavin Flores is a 68year old male. Patient presents with:   Follow - Up  Hypercholesterolemia  CAD  Dyspnea: Mild, occasionally  Dizziness: 1 episode a month ago     HPI:   This is a pleasant 68year old male with coron Packs/day: 1.00        Years: 25.00        Pack years: 25        Types: Cigarettes      Smokeless tobacco: Never Used      Tobacco comment: Quit 15-20 years ago    Alcohol use:  Yes      Alcohol/week: 3.6 - 5.4 oz      Types: 6 - 9 Glasses of wine per week hypertension          In conclusion, this pleasant 68year old male with with coronary disease prior bypass who is overall doing well except for rare atypical shortness of breath. With history would recommend stress echo to further assess.   His lower norm

## 2019-04-16 NOTE — PATIENT INSTRUCTIONS
Schedule stress echocardiogram  Get fasting cholesterol blood test by August  Monitor and record resting blood pressure and pulse after 5 minutes of rest for 1 week and call with results.   When check blood pressure check 3 readings and throw away the first

## 2019-04-23 ENCOUNTER — TELEPHONE (OUTPATIENT)
Dept: AUDIOLOGY | Facility: CLINIC | Age: 74
End: 2019-04-23

## 2019-04-23 NOTE — TELEPHONE ENCOUNTER
Pt is going to be out of town 5/16 through October. States he is not satisfied with new hearing aids, requesting cb also wants appt before 5/16. pls call thank you.

## 2019-04-24 NOTE — TELEPHONE ENCOUNTER
Spoke to patient and fit in appointment on 5/8 at [phone]-- advised appt reminder may say 320, but to please come at [phone]

## 2019-05-04 ENCOUNTER — HOSPITAL ENCOUNTER (OUTPATIENT)
Age: 74
Discharge: HOME | End: 2019-05-04
Attending: EMERGENCY MEDICINE
Payer: MEDICARE

## 2019-05-04 ENCOUNTER — NURSE TRIAGE (OUTPATIENT)
Dept: OTHER | Age: 74
End: 2019-05-04

## 2019-05-04 VITALS
OXYGEN SATURATION: 96 % | HEART RATE: 74 BPM | SYSTOLIC BLOOD PRESSURE: 115 MMHG | DIASTOLIC BLOOD PRESSURE: 65 MMHG | RESPIRATION RATE: 18 BRPM | TEMPERATURE: 98 F | WEIGHT: 160 LBS | HEIGHT: 70 IN | BODY MASS INDEX: 22.9 KG/M2

## 2019-05-04 DIAGNOSIS — T78.3XXA ANGIOEDEMA OF LIPS, INITIAL ENCOUNTER: Primary | ICD-10-CM

## 2019-05-04 PROCEDURE — 99204 OFFICE O/P NEW MOD 45 MIN: CPT

## 2019-05-04 PROCEDURE — 99213 OFFICE O/P EST LOW 20 MIN: CPT

## 2019-05-04 RX ORDER — FAMOTIDINE 20 MG/1
40 TABLET ORAL ONCE
Status: COMPLETED | OUTPATIENT
Start: 2019-05-04 | End: 2019-05-04

## 2019-05-04 RX ORDER — DIPHENHYDRAMINE HCL 25 MG
50 CAPSULE ORAL ONCE
Status: COMPLETED | OUTPATIENT
Start: 2019-05-04 | End: 2019-05-04

## 2019-05-04 RX ORDER — PREDNISONE 20 MG/1
40 TABLET ORAL ONCE
Status: COMPLETED | OUTPATIENT
Start: 2019-05-04 | End: 2019-05-04

## 2019-05-04 RX ORDER — PREDNISONE 20 MG/1
40 TABLET ORAL DAILY
Qty: 6 TABLET | Refills: 0 | Status: ON HOLD | OUTPATIENT
Start: 2019-05-04 | End: 2019-05-06

## 2019-05-04 NOTE — ED PROVIDER NOTES
Patient Seen in: [facility] Immediate Care In [address] [place] [address]    History   Patient presents with:  Swelling Edema (cardiovascular, metabolic)    Stated Complaint: SWOLLEN LIP     HPI    The patient is a 67-year-old male with a history of hypertension, on Oral   SpO2 98 %   O2 Device None (Room air)       Current:/65   Pulse 74   Temp 98.4 °F (36.9 °C) (Oral)   Resp 18   Ht 177.8 cm (5' 10\")   Wt 72.6 kg   SpO2 96%   BMI 22.96 kg/m²         Physical Exam    Alert male no acute distress  HEENT: Normoc (40 mg total) by mouth daily for 3 days. , Normal, Disp-6 tablet, R-0

## 2019-05-04 NOTE — ED NOTES
Has appointment with Rachael Acosta in office 0940 Monday. Prescription given and reviewed. With family. Call 911 for any increased shortness of breath continual swelling tightness in throat mouth tongue.  And new or worse concerns call 911

## 2019-05-04 NOTE — ED NOTES
Upper lip appears more swollen denies tongue edema speech clear. No drooling voice clear.  vvs.  Encouraged to avoid all meds ending in PRIL. Reviewed Ace Inhibitors.  Added to allergy list.

## 2019-05-06 ENCOUNTER — OFFICE VISIT (OUTPATIENT)
Dept: FAMILY MEDICINE CLINIC | Facility: CLINIC | Age: 74
End: 2019-05-06
Payer: MEDICARE

## 2019-05-06 ENCOUNTER — APPOINTMENT (OUTPATIENT)
Dept: ULTRASOUND IMAGING | Facility: HOSPITAL | Age: 74
DRG: 683 | End: 2019-05-06
Attending: EMERGENCY MEDICINE
Payer: MEDICARE

## 2019-05-06 ENCOUNTER — HOSPITAL ENCOUNTER (INPATIENT)
Facility: HOSPITAL | Age: 74
LOS: 9 days | Discharge: NURSING, REHAB OR LONG-TERM CARE | DRG: 683 | End: 2019-05-15
Attending: EMERGENCY MEDICINE | Admitting: HOSPITALIST
Payer: MEDICARE

## 2019-05-06 ENCOUNTER — LAB ENCOUNTER (OUTPATIENT)
Dept: LAB | Age: 74
DRG: 683 | End: 2019-05-06
Attending: FAMILY MEDICINE
Payer: MEDICARE

## 2019-05-06 ENCOUNTER — HOSPITAL ENCOUNTER (OUTPATIENT)
Dept: GENERAL RADIOLOGY | Age: 74
Discharge: HOME | DRG: 683 | End: 2019-05-06
Attending: FAMILY MEDICINE
Payer: MEDICARE

## 2019-05-06 VITALS
HEART RATE: 79 BPM | WEIGHT: 171 LBS | SYSTOLIC BLOOD PRESSURE: 115 MMHG | TEMPERATURE: 97 F | HEIGHT: 70 IN | DIASTOLIC BLOOD PRESSURE: 74 MMHG | BODY MASS INDEX: 24.48 KG/M2

## 2019-05-06 DIAGNOSIS — R14.0 ABDOMINAL DISTENSION: ICD-10-CM

## 2019-05-06 DIAGNOSIS — IMO0001 ALCOHOLISM /ALCOHOL ABUSE: ICD-10-CM

## 2019-05-06 DIAGNOSIS — R39.12 BENIGN PROSTATIC HYPERPLASIA WITH WEAK URINARY STREAM: ICD-10-CM

## 2019-05-06 DIAGNOSIS — F10.10 ALCOHOL ABUSE: ICD-10-CM

## 2019-05-06 DIAGNOSIS — I25.10 ATHEROSCLEROSIS OF NATIVE CORONARY ARTERY OF NATIVE HEART WITHOUT ANGINA PECTORIS: ICD-10-CM

## 2019-05-06 DIAGNOSIS — F41.9 ANXIETY AND DEPRESSION: ICD-10-CM

## 2019-05-06 DIAGNOSIS — N40.1 BENIGN PROSTATIC HYPERPLASIA WITH WEAK URINARY STREAM: ICD-10-CM

## 2019-05-06 DIAGNOSIS — N28.9 ACUTE RENAL INSUFFICIENCY: Primary | ICD-10-CM

## 2019-05-06 DIAGNOSIS — N40.1 BENIGN PROSTATIC HYPERPLASIA WITH LOWER URINARY TRACT SYMPTOMS, SYMPTOM DETAILS UNSPECIFIED: ICD-10-CM

## 2019-05-06 DIAGNOSIS — F32.A ANXIETY AND DEPRESSION: ICD-10-CM

## 2019-05-06 DIAGNOSIS — R22.0 SWELLING OF UPPER LIP: Primary | ICD-10-CM

## 2019-05-06 DIAGNOSIS — R33.8 ACUTE URINARY RETENTION: ICD-10-CM

## 2019-05-06 DIAGNOSIS — G47.00 INSOMNIA, UNSPECIFIED TYPE: ICD-10-CM

## 2019-05-06 DIAGNOSIS — R41.3 MEMORY LOSS: ICD-10-CM

## 2019-05-06 PROCEDURE — 36415 COLL VENOUS BLD VENIPUNCTURE: CPT

## 2019-05-06 PROCEDURE — 82977 ASSAY OF GGT: CPT

## 2019-05-06 PROCEDURE — 99222 1ST HOSP IP/OBS MODERATE 55: CPT | Performed by: UROLOGY

## 2019-05-06 PROCEDURE — G0463 HOSPITAL OUTPT CLINIC VISIT: HCPCS | Performed by: FAMILY MEDICINE

## 2019-05-06 PROCEDURE — 76770 US EXAM ABDO BACK WALL COMP: CPT | Performed by: EMERGENCY MEDICINE

## 2019-05-06 PROCEDURE — 74018 RADEX ABDOMEN 1 VIEW: CPT | Performed by: FAMILY MEDICINE

## 2019-05-06 PROCEDURE — 80061 LIPID PANEL: CPT

## 2019-05-06 PROCEDURE — 85025 COMPLETE CBC W/AUTO DIFF WBC: CPT

## 2019-05-06 PROCEDURE — 51703 INSERT BLADDER CATH COMPLEX: CPT | Performed by: UROLOGY

## 2019-05-06 PROCEDURE — 99215 OFFICE O/P EST HI 40 MIN: CPT | Performed by: FAMILY MEDICINE

## 2019-05-06 PROCEDURE — 99223 1ST HOSP IP/OBS HIGH 75: CPT | Performed by: HOSPITALIST

## 2019-05-06 PROCEDURE — 80053 COMPREHEN METABOLIC PANEL: CPT

## 2019-05-06 PROCEDURE — 82607 VITAMIN B-12: CPT

## 2019-05-06 RX ORDER — TAMSULOSIN HYDROCHLORIDE 0.4 MG/1
0.4 CAPSULE ORAL DAILY
Qty: 90 CAPSULE | Refills: 1 | Status: ON HOLD | OUTPATIENT
Start: 2019-05-06 | End: 2019-05-06

## 2019-05-06 RX ORDER — 0.9 % SODIUM CHLORIDE 0.9 %
VIAL (ML) INJECTION
Status: DISPENSED
Start: 2019-05-06 | End: 2019-05-07

## 2019-05-06 RX ORDER — ACETAMINOPHEN 325 MG/1
650 TABLET ORAL EVERY 6 HOURS PRN
Status: DISCONTINUED | OUTPATIENT
Start: 2019-05-06 | End: 2019-05-15

## 2019-05-06 RX ORDER — SODIUM CHLORIDE 0.9 % (FLUSH) 0.9 %
3 SYRINGE (ML) INJECTION AS NEEDED
Status: DISCONTINUED | OUTPATIENT
Start: 2019-05-06 | End: 2019-05-15

## 2019-05-06 RX ORDER — ESCITALOPRAM OXALATE 10 MG/1
10 TABLET ORAL DAILY
Qty: 90 TABLET | Refills: 0 | Status: ON HOLD | OUTPATIENT
Start: 2019-05-06 | End: 2019-05-06

## 2019-05-06 RX ORDER — FINASTERIDE 5 MG/1
5 TABLET, FILM COATED ORAL DAILY
Status: DISCONTINUED | OUTPATIENT
Start: 2019-05-06 | End: 2019-05-15

## 2019-05-06 RX ORDER — ALFUZOSIN HYDROCHLORIDE 10 MG/1
10 TABLET, EXTENDED RELEASE ORAL
Status: DISCONTINUED | OUTPATIENT
Start: 2019-05-07 | End: 2019-05-15

## 2019-05-06 RX ORDER — ALFUZOSIN HYDROCHLORIDE 10 MG/1
10 TABLET, EXTENDED RELEASE ORAL ONCE
Status: COMPLETED | OUTPATIENT
Start: 2019-05-06 | End: 2019-05-06

## 2019-05-06 RX ORDER — HYDROCODONE BITARTRATE AND ACETAMINOPHEN 5; 325 MG/1; MG/1
1 TABLET ORAL EVERY 6 HOURS PRN
Status: DISCONTINUED | OUTPATIENT
Start: 2019-05-06 | End: 2019-05-15

## 2019-05-06 RX ORDER — LIDOCAINE HYDROCHLORIDE 20 MG/ML
10 JELLY TOPICAL ONCE
Status: COMPLETED | OUTPATIENT
Start: 2019-05-06 | End: 2019-05-06

## 2019-05-06 RX ORDER — SODIUM CHLORIDE 9 MG/ML
INJECTION, SOLUTION INTRAVENOUS CONTINUOUS
Status: DISCONTINUED | OUTPATIENT
Start: 2019-05-06 | End: 2019-05-11

## 2019-05-06 RX ORDER — ONDANSETRON 2 MG/ML
4 INJECTION INTRAMUSCULAR; INTRAVENOUS EVERY 6 HOURS PRN
Status: DISCONTINUED | OUTPATIENT
Start: 2019-05-06 | End: 2019-05-15

## 2019-05-06 RX ORDER — METOPROLOL SUCCINATE 25 MG/1
25 TABLET, EXTENDED RELEASE ORAL
Status: DISCONTINUED | OUTPATIENT
Start: 2019-05-07 | End: 2019-05-08

## 2019-05-06 NOTE — PATIENT INSTRUCTIONS
Please call the psychiatrist she saw at [facility] . get an appointment. Bring your medications with you.

## 2019-05-06 NOTE — ED NOTES
Pt  had an outpt abd XR done today for urinary dribbling.  was told to come to ER for further evaluation for urinary retention.  was told the XR showed bladder distention.  a few weeks ago had UTI that he was treated for.   States hopper

## 2019-05-06 NOTE — PROGRESS NOTES
Spoke with patient, advised to go to ER  And will go to ER [facility] today.     Notes recorded by Jewel Camargo DO on 5/6/2019 at 3:23 PM CDT  You have a very large mass in the pelvis.  This could be a massively distended urinary bladder.  I spoke to the radiol

## 2019-05-06 NOTE — ED PROVIDER NOTES
Patient Seen in: [facility] AND [facility] Emergency Department    History   Patient presents with:  Urinary Symptoms (urologic)    Stated Complaint:     HPI    23-year-old male presents for difficulty urinating for the past few weeks.   He was seen by his primar reviewed. All other systems reviewed and negative except as noted above.     Physical Exam     ED Triage Vitals [05/06/19 1547]   BP (!) 172/93   Pulse 70   Resp 20   Temp 97.3 °F (36.3 °C)   Temp src Temporal   SpO2 98 %   O2 Device None (Room air) -----------         ------                     CBC W/ DIFFERENTIAL[[id number]]          Abnormal            Final result                 Please view results for these tests on the individual orders.    [address] distended urinary bladder? 2. Atherosclerosis. 3. Demineralization. 4. Osteoarthritis. 5. Transitional vertebra. 6. Postop changes in the chest. 7. This report was communicated by telephone to Dr. Sarah Sutherland at the dictation time shown below.      Dictat

## 2019-05-06 NOTE — ED INITIAL ASSESSMENT (HPI)
Presents s/p output xray today showing massively distended urinary bladder. Pt c/o \"urinary dribbling\" for past few weeks.

## 2019-05-06 NOTE — PROGRESS NOTES
Patient ID: Sandra Pichardo is a 68year old male.     HPI  Patient presents with:  Depression  Swelling: upper lip ; follow up for urgent care  Incontinence    HPI and MDM from UC visit on 5/4/19     HPI  The patient is a 72-year-old male with a history drinking wine and drinks every day. Reports that his drinking is better and worse depending on day. The amount that he drinks every day changes.   He saw a psychiatrist in the past and reports that the medication did not help but he was told that there are kg/m²  01/29/19 : 23.50 kg/m²  01/04/19 : 23.85 kg/m²      BP Readings from Last 6 Encounters:  05/06/19 : 115/74  05/04/19 : 115/65  04/16/19 : 91/61  02/05/19 : (!) 157/97  01/29/19 : 140/70  01/04/19 : 109/69        Review of Systems   Constitutional: P mouth once daily. Disp: 90 tablet Rfl: 1   multivitamin Oral Tab Take 1 tablet by mouth daily. Disp: 30 tablet Rfl: 0   aspirin  MG Oral Tab EC Take 325 mg by mouth daily.    Disp:  Rfl:      Allergies:  Ramipril                ANGIOEDEMA    Comment:S depression  -     escitalopram 10 MG Oral Tab; Take 1 tablet (10 mg total) by mouth daily. For mood  Lets start Lexapro. He clearly has anxiety and depression  Alcohol abuse  -     escitalopram 10 MG Oral Tab; Take 1 tablet (10 mg total) by mouth daily.  Arthor Bernheim Requested Specialty:UROLOGY          Number of Visits Requested:3      Ronny Jones  5/6/2019    By signing my name below, I, Ronny Jones,  attest that this documentation has been prepared under the direction and in the presence of Sanjuanita Maloney DO.

## 2019-05-07 ENCOUNTER — AUDIOLOGY DOCUMENTATION (OUTPATIENT)
Dept: AUDIOLOGY | Facility: CLINIC | Age: 74
End: 2019-05-07

## 2019-05-07 ENCOUNTER — TELEPHONE (OUTPATIENT)
Dept: OTHER | Age: 74
End: 2019-05-07

## 2019-05-07 ENCOUNTER — TELEPHONE (OUTPATIENT)
Dept: AUDIOLOGY | Facility: CLINIC | Age: 74
End: 2019-05-07

## 2019-05-07 PROBLEM — R33.8 ACUTE URINARY RETENTION: Status: RESOLVED | Noted: 2019-05-06 | Resolved: 2019-05-07

## 2019-05-07 PROCEDURE — 99231 SBSQ HOSP IP/OBS SF/LOW 25: CPT | Performed by: UROLOGY

## 2019-05-07 PROCEDURE — 99233 SBSQ HOSP IP/OBS HIGH 50: CPT | Performed by: HOSPITALIST

## 2019-05-07 RX ORDER — PANTOPRAZOLE SODIUM 40 MG/1
40 TABLET, DELAYED RELEASE ORAL
Status: DISCONTINUED | OUTPATIENT
Start: 2019-05-07 | End: 2019-05-15

## 2019-05-07 RX ORDER — MORPHINE SULFATE 2 MG/ML
INJECTION, SOLUTION INTRAMUSCULAR; INTRAVENOUS
Status: DISPENSED
Start: 2019-05-07 | End: 2019-05-07

## 2019-05-07 RX ORDER — MORPHINE SULFATE 2 MG/ML
2 INJECTION, SOLUTION INTRAMUSCULAR; INTRAVENOUS EVERY 4 HOURS PRN
Status: DISCONTINUED | OUTPATIENT
Start: 2019-05-07 | End: 2019-05-15

## 2019-05-07 RX ORDER — ATROPA BELLADONNA AND OPIUM 16.2; 6 MG/1; MG/1
1 SUPPOSITORY RECTAL EVERY 8 HOURS PRN
Status: DISCONTINUED | OUTPATIENT
Start: 2019-05-07 | End: 2019-05-15

## 2019-05-07 RX ORDER — 0.9 % SODIUM CHLORIDE 0.9 %
VIAL (ML) INJECTION
Status: DISPENSED
Start: 2019-05-07 | End: 2019-05-07

## 2019-05-07 NOTE — TELEPHONE ENCOUNTER
Spoke to Roxana Randiroopa. After discussion, offered option to return for credit- less the return fee. She is aware and will return aids by end of day today. He may revisit hearing aid use once he is feeling better and/or back from [place].

## 2019-05-07 NOTE — PLAN OF CARE
Problem: Patient Centered Care  Goal: Patient preferences are identified and integrated in the patient's plan of care  Description  Interventions:  - What would you like us to know as we care for you?  I have been experiencing a weak stream of urine and \ scan as needed  - Follow urinary retention protocol/standard of care  - Consider collaborating with pharmacy to review patient's medication profile  - Implement strategies to promote bladder emptying  Outcome: Progressing     Problem: METABOLIC/FLUID AND E urinary retention and enlarged prostate. Chery inserted in ED tonight and patient had 4800 cc of urine out after insertion. Urine is dark red with small clots Patient had several traumatic insertion attempts in the ED.  US of kidney showed moderated bilate

## 2019-05-07 NOTE — CONSULTS
[facility] Urology  Report of Initial Consultation    Elliott Coon Patient Status:  Inpatient    1945 MRN [id number]   Location [facility] 1W Attending Jessee Andrews MD   Hosp Day # 0 PCP Arturo Patel TONSILLECTOMY       Family History  Family History   Problem Relation Age of Onset   • Lipids Father         Hyperlipidemia; per NG   • Heart Disease Father         per NG   • Diabetes Maternal Grandfather         per NG   • Cancer Paternal Grandfather No acute distress. Alert and oriented x 3  HEENT: Moist mucous membranes  Respiratory: Breathing comfortably  Cardiovascular: S1, S2  Abdomen: soft, bladder evidently distended to above the umbilicus. Mild TTP. Genitourinary: Circumcised male.  Bilateral resolved. Recommendations:  1. Keep finley in place. 2. Monitor for post-obstructive diuresis. 3. Start Uroxatral and Proscar daily. 4. Renal / Bladder ultrasound. 5. Trend serial BMP Q12 hours.    6. Will require further outpatient evaluation and m

## 2019-05-07 NOTE — ED NOTES
Orders for admission, patient is aware of plan and ready to go upstairs. Any questions, please call ED SUZANNA Boone  at extension [phone]. Pt is alert and oriented. Pt was found to have distended bladder on an Xray he had as an outpt today.  We were unsucc

## 2019-05-07 NOTE — TELEPHONE ENCOUNTER
Pt is currently in the hospital but is supposed to see tahmina monet tomorrow for hearing aid issues. Patient is going out town the 16th as long as he is okd. He would like to be seen by audio before this so he can having hearing aid issue resolved.  HERBERTH signed

## 2019-05-07 NOTE — H&P
[facility]    PATIENT'S NAME: Nanda Mcallister   ATTENDING PHYSICIAN: Melissa Hood MD   PATIENT ACCOUNT#:   [id number]    LOCATION:  [place] [address]  MEDICAL RECORD #:   [id number]       YOB: 1945  ADMISSION DATE:       05/06/ poor historian. He reports that he has been having difficulty with urination for the last 2 weeks.   He had a urinary tract infection treated around 2 weeks ago, but for the last 2 to 3 days he has been having fullness in his lower abdomen with weak stream insertion in house. Further recommendations to follow.     Dictated By Adrienne De Guzman MD  d: 05/06/2019 18:59:04  t: 05/06/2019 19:11:33  [facility] [id number]  /

## 2019-05-07 NOTE — TELEPHONE ENCOUNTER
Dr Mariscal=FYI, patient admitted at [address] [place] [address] yesterday.     Please reply to drew: CHRISTOPH Galvez

## 2019-05-07 NOTE — PROGRESS NOTES
[facility]  Urology Consult Follow-Up Note    Marina Myersi Patient Status:  Inpatient    1945 MRN [id number]   Location [facility] 1W Attending Robi Chou MD   Hosp Day # 1 PCP FRANCOIS Membreno on 5/06/2019 at 14:45     Approved by (CST): Kamla Pedraza MD on 5/06/2019 at 14:57          Assessment and Plan:   68year old male with acute urinary retention and ALTA. - Renal parameters improving.  Creatinine down to 2.3 this AM    - Gross hemat

## 2019-05-07 NOTE — PROGRESS NOTES
[facility]    Progress Note    Marina Borden Patient Status:  Inpatient    1945 MRN [id number]   Location [facility] 1W Attending Robi Chou MD   Hosp Day # 1 PCP Gretchen Ridley DO        Subjective:     [facility] ago  No recurrent symptoms, pt is physically active  - aspirin currently on hold d/t hematuria      DVT: SCDs, ambulation  CODE: Full   DISPO: pending        Results:     Lab Results   Component Value Date    WBC 5.3 05/07/2019    HGB 10.1 (L) 05/07/2019

## 2019-05-08 PROCEDURE — 99233 SBSQ HOSP IP/OBS HIGH 50: CPT | Performed by: HOSPITALIST

## 2019-05-08 PROCEDURE — 90792 PSYCH DIAG EVAL W/MED SRVCS: CPT | Performed by: OTHER

## 2019-05-08 PROCEDURE — 99231 SBSQ HOSP IP/OBS SF/LOW 25: CPT | Performed by: UROLOGY

## 2019-05-08 RX ORDER — POTASSIUM CHLORIDE 20 MEQ/1
40 TABLET, EXTENDED RELEASE ORAL EVERY 4 HOURS
Status: COMPLETED | OUTPATIENT
Start: 2019-05-08 | End: 2019-05-08

## 2019-05-08 RX ORDER — HALOPERIDOL 5 MG/ML
1 INJECTION INTRAMUSCULAR EVERY 2 HOUR PRN
Status: DISCONTINUED | OUTPATIENT
Start: 2019-05-08 | End: 2019-05-15

## 2019-05-08 RX ORDER — LORAZEPAM 1 MG/1
1 TABLET ORAL
Status: DISCONTINUED | OUTPATIENT
Start: 2019-05-08 | End: 2019-05-15

## 2019-05-08 RX ORDER — FOLIC ACID 1 MG/1
1 TABLET ORAL DAILY
Status: DISCONTINUED | OUTPATIENT
Start: 2019-05-08 | End: 2019-05-15

## 2019-05-08 RX ORDER — QUETIAPINE 25 MG/1
50 TABLET, FILM COATED ORAL NIGHTLY
Status: DISCONTINUED | OUTPATIENT
Start: 2019-05-08 | End: 2019-05-15

## 2019-05-08 RX ORDER — MELATONIN
100 DAILY
Status: DISCONTINUED | OUTPATIENT
Start: 2019-05-09 | End: 2019-05-15

## 2019-05-08 RX ORDER — LORAZEPAM 1 MG/1
2 TABLET ORAL
Status: DISCONTINUED | OUTPATIENT
Start: 2019-05-08 | End: 2019-05-15

## 2019-05-08 RX ORDER — MULTIPLE VITAMINS W/ MINERALS TAB 9MG-400MCG
1 TAB ORAL DAILY
Status: DISCONTINUED | OUTPATIENT
Start: 2019-05-08 | End: 2019-05-15

## 2019-05-08 RX ORDER — MAGNESIUM OXIDE 400 MG (241.3 MG MAGNESIUM) TABLET
800 TABLET ONCE
Status: COMPLETED | OUTPATIENT
Start: 2019-05-08 | End: 2019-05-08

## 2019-05-08 RX ORDER — CHLORDIAZEPOXIDE HYDROCHLORIDE 25 MG/1
25 CAPSULE, GELATIN COATED ORAL 3 TIMES DAILY
Status: DISCONTINUED | OUTPATIENT
Start: 2019-05-08 | End: 2019-05-11

## 2019-05-08 RX ORDER — 0.9 % SODIUM CHLORIDE 0.9 %
VIAL (ML) INJECTION
Status: COMPLETED
Start: 2019-05-08 | End: 2019-05-08

## 2019-05-08 RX ORDER — LORAZEPAM 2 MG/ML
2 INJECTION INTRAMUSCULAR
Status: DISCONTINUED | OUTPATIENT
Start: 2019-05-08 | End: 2019-05-15

## 2019-05-08 RX ORDER — LORAZEPAM 2 MG/ML
1 INJECTION INTRAMUSCULAR
Status: DISCONTINUED | OUTPATIENT
Start: 2019-05-08 | End: 2019-05-15

## 2019-05-08 NOTE — PLAN OF CARE
Problem: Patient Centered Care  Goal: Patient preferences are identified and integrated in the patient's plan of care  Description  Interventions:  - What would you like us to know as we care for you?  I have been experiencing a weak stream of urine and \ scan as needed  - Follow urinary retention protocol/standard of care  - Consider collaborating with pharmacy to review patient's medication profile  - Implement strategies to promote bladder emptying  Outcome: Progressing     Problem: METABOLIC/FLUID AND E Skin check completed. Urine in finley tube clear yellow. Up with 2 assist and walker. Bed alarm on. Fall precautions in place. Pt reminded to use call light. Pt agitated, attempting to pull out finley catheter and get out of bed.  Frequent rounding by staff,

## 2019-05-08 NOTE — OCCUPATIONAL THERAPY NOTE
OCCUPATIONAL THERAPY EVALUATION - INPATIENT     Room Number: 106/106-A  Evaluation Date: 5/8/2019  Type of Evaluation: Initial  Presenting Problem: acute renal insufficiency    Physician Order: IP Consult to Occupational Therapy  Reason for Therapy: ADL/IA management to ensure he has an eye on it all times to prevent pulling of the catheter and to manage slack on line; pt verbalized understanding and will benefit from continued education from RN staff for management at home.  Patient is on track to d/c to [place] Surgical History:   Procedure Laterality Date   • CABG  2007    per NG   • CATARACT     • TONSILLECTOMY         HOME SITUATION  Type of Home: House  Home Layout: Multi-level  Lives With: Spouse     Toilet and Equipment: Standard height toilet  Shower/Tub a CI    FUNCTIONAL TRANSFER ASSESSMENT  Supine to Sit : Supervision  Sit to Stand: Contact guard assist  Toilet Transfer: SBA  Bedroom Mobility: SBA-CGA     BALANCE ASSESSMENT  Static Sitting: Good  Dynamic Sitting: Fair+  Static Standing: Fair  Dynamic Horacio

## 2019-05-08 NOTE — PHYSICAL THERAPY NOTE
PHYSICAL THERAPY EVALUATION - INPATIENT     Room Number: 106/106-A  Evaluation Date: 5/8/2019  Type of Evaluation: Initial   Physician Order: PT Eval and Treat    Presenting Problem: urinary retention with Acute renal insufficiency --memory loss / ABD dis TUG 2 12. 2 sec . Pt able to amb in room and shorter distance with no AD close SBA to CGA if pt distracted or with turns. Therapy did trial use of RW for longer distance of 240 ft x 1 with SBA . RW did appear to help with pt stability and safety .  Pt but prior to therapy documenation , therapy follow and reassessment will be needed for updated d/c recommendations.    D/c plans pending pt progress , further acute management and support in home at d/c .  SW to work with pt and pt family on optimal d/c kristal per NG   • Cataract     cataract surgery    • Coronary artery disease     CABG; per NG   • Essential hypertension    • Gastrointestinal complaints, nonspecific     Bowel problems; per NG   • Hearing impairment    • High blood pressure    • High choleste solutions    RANGE OF MOTION AND STRENGTH ASSESSMENT    Pt presents with mild generalized weakness as noted by unsteady gait , pt appears shaky  Pt LE ROM grossly WFL   Pt UE ROM grossly WFL             BALANCE  Static Sitting: Good  Dynamic Sitting: Good steps , pt education for one step at a time , pt attempting reciprocol step climbing even after cues and education , pt with one LOB on stairs with min assist needed .   if pt complaint with one step at a time CGA needed     Patient End of Session: Up in ch

## 2019-05-08 NOTE — PROGRESS NOTES
[facility]    Progress Note    Carlene Sanchez Patient Status:  Inpatient    1945 MRN [id number]   Location [facility] 1W Attending Kimberly Novak MD   Hosp Day # 2 PCP DO Benjamin Sutton is warm and dry. Psychiatric: He has a normal mood and affect.            Assessment and Plan:   Acute urinary retention   This is most likely related to enlarged prostate  - s/p finley insertion by urology after multiple attempts prior  - continue uroxatr

## 2019-05-09 PROCEDURE — 99233 SBSQ HOSP IP/OBS HIGH 50: CPT | Performed by: OTHER

## 2019-05-09 PROCEDURE — 99232 SBSQ HOSP IP/OBS MODERATE 35: CPT | Performed by: HOSPITALIST

## 2019-05-09 RX ORDER — METOPROLOL TARTRATE 5 MG/5ML
5 INJECTION INTRAVENOUS ONCE
Status: COMPLETED | OUTPATIENT
Start: 2019-05-09 | End: 2019-05-09

## 2019-05-09 RX ORDER — MAGNESIUM OXIDE 400 MG (241.3 MG MAGNESIUM) TABLET
800 TABLET ONCE
Status: COMPLETED | OUTPATIENT
Start: 2019-05-09 | End: 2019-05-09

## 2019-05-09 NOTE — PROGRESS NOTES
[facility] - [facility]    Progress Note    Wendy Fairbanks Patient Status:  Inpatient    1945 MRN [id number]   Location [facility] 1W Attending Caryn Murillo MD   Hosp Day # 3 PCP [address] [place] [address],            Attending Addendum Alcohol dependence with withdrawal   - CIWA- 9+ overnight  - Psych following  - started on librium, seroquel, and metoprolol  - sitter for safety- will try soft mitt restraints      Acute renal failure  D/t urinary retention  Bilateral hydronephrosis o

## 2019-05-09 NOTE — PLAN OF CARE
Problem: Patient Centered Care  Goal: Patient preferences are identified and integrated in the patient's plan of care  Description  Interventions:  - What would you like us to know as we care for you?  I have been experiencing a weak stream of urine and \ scan as needed  - Follow urinary retention protocol/standard of care  - Consider collaborating with pharmacy to review patient's medication profile  - Implement strategies to promote bladder emptying  Outcome: Progressing     Problem: METABOLIC/FLUID AND E will remain free from self-harm  Description  INTERVENTIONS:  - Apply the least restrictive restraint to prevent harm  - Notify patient and family of reasons restraints applied  - Assess for any contributing factors to confusion (electrolyte disturbances,

## 2019-05-09 NOTE — CONSULTS
[facility]    Report of Consultation    Adiel Carmona Patient Status:  Inpatient    1945 MRN [id number]   Location [facility] 5SW/SE Attending Ian Del Rio MD   Hosp Day # 2 PCP Dilcia Mitchell DO     Date of Admission: denied any auditory or visual hallucination although patient has been exhibiting response to internal stimuli and delirious.         Medical History:       Past Medical History  Past Medical History:   Diagnosis Date   • Alcohol dependence ([facility] [address]    • Dixie Nikolay Thiamine HCl tab 100 mg 100 mg Oral Daily   multivitamin with minerals (ADULT) tab 1 tablet 1 tablet Oral Daily   folic acid (FOLVITE) tab 1 mg 1 mg Oral Daily   chlordiazePOXIDE HCl (LIBRIUM) cap 25 mg 25 mg Oral TID   QUEtiapine Fumarate (SEROQUEL) tab 5 05/08/2019     05/08/2019     05/08/2019    CO2 25.0 05/08/2019    CO2 25.0 05/08/2019    GLU 87 05/08/2019    CA 7.6 (L) 05/08/2019    ALB 4.4 05/06/2019    ALKPHO 87 05/06/2019    TP 7.6 05/06/2019    AST 66 (H) 05/06/2019    ALT 43 05/06/201 and severity. The patient with history of alcoholism who has a presented with renal insufficiency with high BUN/creatinine and withdrawal and delirium syndrome. At this point, I would recommend the following approach:     1.   Focus on education and suppo

## 2019-05-09 NOTE — PROGRESS NOTES
[facility]  Urology Consult Follow-Up Note    Elia Katerinalast Patient Status:  Inpatient    1945 MRN [id number]   Location [facility] 1W Attending Enid Hinojosa MD   Hosp Day # 2 PCP FRANCOIS Johnson likely due to detrusor bleeding following relief of urinary retention. This is usually self limited and has almost resolved. H/H reviewed. Stabilized. 1. Continue finley catheter.  Patient will be discharged home with the catheter and seen in the office

## 2019-05-09 NOTE — CM/SW NOTE
SW contacted pt's wife/Flavia to discuss d/c planning. Pt lives at home w/ wife. Wife stated that pt does not use DME or current w/ any services. Wife stated pt is typically independent w/ ADL's and drives.  Wife stated that pt will need outpt resources for

## 2019-05-10 PROCEDURE — 99232 SBSQ HOSP IP/OBS MODERATE 35: CPT | Performed by: HOSPITALIST

## 2019-05-10 PROCEDURE — 99233 SBSQ HOSP IP/OBS HIGH 50: CPT | Performed by: OTHER

## 2019-05-10 RX ORDER — DIVALPROEX SODIUM 250 MG/1
250 TABLET, DELAYED RELEASE ORAL 2 TIMES DAILY
Status: DISCONTINUED | OUTPATIENT
Start: 2019-05-10 | End: 2019-05-15

## 2019-05-10 RX ORDER — POTASSIUM CHLORIDE 14.9 MG/ML
20 INJECTION INTRAVENOUS ONCE
Status: COMPLETED | OUTPATIENT
Start: 2019-05-10 | End: 2019-05-10

## 2019-05-10 RX ORDER — MAGNESIUM OXIDE 400 MG (241.3 MG MAGNESIUM) TABLET
800 TABLET ONCE
Status: COMPLETED | OUTPATIENT
Start: 2019-05-10 | End: 2019-05-10

## 2019-05-10 NOTE — BH PROGRESS NOTE
Behavioral Health Note  CHIEF COMPLAINT  Obstructive uropathy, acute renal failure, and urinary retention     REASON FOR ADMISSION  Obstructive uropathy, acute renal failure, and urinary retention     SOCIAL HISTORY  Emanuel William lives at home with his wife and

## 2019-05-10 NOTE — OCCUPATIONAL THERAPY NOTE
Discussed with RN Stephanie Lock regarding patient's change in status; pt is currently on CIWA and 1:1 with sitter; asked RN for new orders when patient is appropriate for reassessment.      Jeannie Ruiz, OTR/L   [address] [place] [address]

## 2019-05-10 NOTE — PROGRESS NOTES
[facility]    Progress Note    Shannon Barnes Patient Status:  Inpatient    1945 MRN [id number]   Location [facility] 1W Attending Lexa Agosto MD   Hosp Day # 4 PCP Deysi Min, DO           Attending Addendum dependence with withdrawal   - CIWA  - Psych following  - started on librium, seroquel, and metoprolol  - sitter for safety  - no restraints at this time     Acute renal failure  D/t urinary retention  Bilateral hydronephrosis on ultrasound  - continue IVF

## 2019-05-10 NOTE — PROGRESS NOTES
Antony Ocampo is a 68year old   male with history of hypertension, hyperlipidemia, GERD and alcoholism who presented to the hospital with obstructive uropathy and found with acute renal insufficiency.   The patient seen today for over 35 Procedure Laterality Date   • CABG  2007    per NG   • CATARACT     • TONSILLECTOMY        Family History   Problem Relation Age of Onset   • Lipids Father         Hyperlipidemia; per NG   • Heart Disease Father         per NG   • Diabetes Maternal [place] 650 mg Oral Q6H PRN   ondansetron HCl (ZOFRAN) injection 4 mg 4 mg Intravenous Q6H PRN   Alfuzosin HCl ER (UROXATRAL) 24 hr tab 10 mg 10 mg Oral Daily with breakfast   finasteride (PROSCAR) tab 5 mg 5 mg Oral Daily   HYDROcodone-acetaminophen (Londell Jules) 5-325 severity. The patient with history of alcoholism who has a presented with renal insufficiency with high BUN/creatinine and withdrawal and delirium syndrome. At this point, I would recommend the following approach:      1. Focus on education and support.

## 2019-05-11 PROCEDURE — 99233 SBSQ HOSP IP/OBS HIGH 50: CPT | Performed by: HOSPITALIST

## 2019-05-11 PROCEDURE — 99233 SBSQ HOSP IP/OBS HIGH 50: CPT | Performed by: OTHER

## 2019-05-11 RX ORDER — POTASSIUM CHLORIDE 20 MEQ/1
40 TABLET, EXTENDED RELEASE ORAL ONCE
Status: COMPLETED | OUTPATIENT
Start: 2019-05-11 | End: 2019-05-11

## 2019-05-11 RX ORDER — CHLORDIAZEPOXIDE HYDROCHLORIDE 10 MG/1
10 CAPSULE, GELATIN COATED ORAL 4 TIMES DAILY PRN
Status: DISCONTINUED | OUTPATIENT
Start: 2019-05-11 | End: 2019-05-15

## 2019-05-11 RX ORDER — MAGNESIUM OXIDE 400 MG (241.3 MG MAGNESIUM) TABLET
800 TABLET ONCE
Status: COMPLETED | OUTPATIENT
Start: 2019-05-11 | End: 2019-05-11

## 2019-05-11 NOTE — PHYSICAL THERAPY NOTE
PHYSICAL THERAPY EVALUATION - INPATIENT     Room Number: 556/556-A  Evaluation Date: 5/11/2019  Type of Evaluation: Re-evaluation   Physician Order: PT Eval and Treat    Presenting Problem: urinary retention with Acute renal insufficiency --memory loss / a few days on strength, pt and wife verbalized understanding. Pt wishes to go on his trip to [place], disappointed. Provided active listening and encouragement, discussed rehab expectations, pt verbalized understanding.  Pt left seated in bedside chair, all • High cholesterol    • History of chickenpox     per NG   • History of fracture of arm     per NG   • History of measles     per NG   • Hx of tonsillitis 1950    per NG   • Other and unspecified hyperlipidemia     per NG       Past Surgical History  Pas 4/5    BALANCE  Static Sitting: Poor +  Dynamic Sitting: Poor  Static Standing: Poor  Dynamic Standing: Poor    ACTIVITY TOLERANCE  Pre-activity, supine:  SPO2 98% on 2L  HR 89 bpm  BP /64 mmHg    Post-activity, seated:  SPO2 100% on room air  HR 1 questions and concerns addressed; Alarm set; Family present    CURRENT GOALS    Goals to be met by: 5/25/19  Patient Goal Patient's self-stated goal is: to get stronger; be independent again   Goal #1 Patient is able to demonstrate supine - sit EOB @ level:

## 2019-05-11 NOTE — PROGRESS NOTES
Sandra Pichardo is a 68year old   male with history of hypertension, hyperlipidemia, GERD and alcoholism who presented to the hospital with obstructive uropathy and found with acute renal insufficiency.   The patient seen today for over 35 Tobacco Use      Smoking status: Former Smoker        Packs/day: 1.00        Years: 25.00        Pack years: 25        Types: Cigarettes      Smokeless tobacco: Never Used      Tobacco comment: Quit 15-20 years ago    Alcohol use: Yes      Alcohol/week: 1. Data:  Lab Results   Component Value Date    WBC 5.7 05/09/2019    HGB 10.5 (L) 05/09/2019    HCT 31.2 (L) 05/09/2019    .0 (L) 05/09/2019    CREATSERUM 1.37 (H) 05/10/2019    BUN 16 05/10/2019     05/10/2019    K 3.7 05/10/2019     05/1 changing Librium to PRN for withdrawal.  8.  Follow-up during this admission    Orders This Visit:  Orders Placed This Encounter      Urinalysis with Culture Reflex STAT      CBC With Differential With Platelet      Basic Metabolic Panel (8)      Magnesium

## 2019-05-11 NOTE — PLAN OF CARE
Problem: CARDIOVASCULAR - ADULT  Goal: Maintains optimal cardiac output and hemodynamic stability  Description  INTERVENTIONS:  - Monitor vital signs, rhythm, and trends  - Monitor for bleeding, hypotension and signs of decreased cardiac output  - Evalua ADULT  Goal: Maintains hematologic stability  Description  INTERVENTIONS  - Assess for signs and symptoms of bleeding or hemorrhage  - Monitor labs and vital signs for trends  - Administer supportive blood products/factors, fluids and medications as ordere

## 2019-05-11 NOTE — PROGRESS NOTES
[facility] [facility] - [facility]  Hospitalist Progress Note     Shaila Alvarado Patient Status:  Inpatient    1945  68year old [facility] [id number]   Location 556/556-A Attending Jalen Huizar MD   Hosp Day # 5 PCP Asa Jain DO     ASSESSMENT/PLAN    Ashish Avila 33.2 32.7 32.8   MCHC 33.9 33.4 33.7   RDW 11.5 11.5 11.4   NEPRELIM 4.34 4.02 3.98   WBC 5.3 5.4 5.7   .0* 136.0* 131.0*     Recent Labs   Lab 05/06/19  1058  05/08/19  0419  05/09/19  0648 05/10/19  0627 05/10/19  1521   GLU 97   < > 87  --  90 97 to 2 days depending on how he responds. I discussed with them at length regarding alcohol cessation.   \

## 2019-05-12 PROCEDURE — 99233 SBSQ HOSP IP/OBS HIGH 50: CPT | Performed by: HOSPITALIST

## 2019-05-12 PROCEDURE — 99233 SBSQ HOSP IP/OBS HIGH 50: CPT | Performed by: OTHER

## 2019-05-12 RX ORDER — MAGNESIUM OXIDE 400 MG (241.3 MG MAGNESIUM) TABLET
800 TABLET ONCE
Status: COMPLETED | OUTPATIENT
Start: 2019-05-12 | End: 2019-05-12

## 2019-05-12 RX ORDER — POTASSIUM CHLORIDE 20 MEQ/1
40 TABLET, EXTENDED RELEASE ORAL ONCE
Status: COMPLETED | OUTPATIENT
Start: 2019-05-12 | End: 2019-05-12

## 2019-05-12 NOTE — PROGRESS NOTES
[place] [facility] - [facility]  Hospitalist Progress Note     Mazin Letitiading Patient Status:  Inpatient    1945  68year old CSN [id number]   Location 556/556-A Attending Miguel Su MD   Hosp Day # 6 PCP Kris Jimenez DO     ASSESSMENT/PLAN    Naila Terry peripheral edema. Skin: Skin is warm and dry. No rashes, erythema, diaphoresis. Psych: Normal mood and affect.  Calm, cooperative    Labs:  Recent Labs   Lab 05/07/19  0539 05/08/19  0419 05/09/19  0648   RBC 3.04* 2.97* 3.20*   HGB 10.1* 9.7* 10.5*   HC Alkaloids-Opium, Normal Saline Flush, acetaminophen, ondansetron HCl, HYDROcodone-acetaminophen    >45min spent, >50% spent counseling and coordinating care in the form of educating pt/family and d/w consultants and staff.   I discussed with patient and wif

## 2019-05-12 NOTE — PLAN OF CARE
Problem: CARDIOVASCULAR - ADULT  Goal: Maintains optimal cardiac output and hemodynamic stability  Description  INTERVENTIONS:  - Monitor vital signs, rhythm, and trends  - Monitor for bleeding, hypotension and signs of decreased cardiac output  - Evalua prevent harm  - Notify patient and family of reasons restraints applied  - Assess for any contributing factors to confusion (electrolyte disturbances, delirium, medications)  - Discontinue any unnecessary medical devices as soon as possible  - Assess the p

## 2019-05-12 NOTE — PROGRESS NOTES
Marina Borden is a 68year old   male with history of hypertension, hyperlipidemia, GERD and alcoholism who presented to the hospital with obstructive uropathy and found with acute renal insufficiency.   The patient seen today for over 35 • TONSILLECTOMY        Family History   Problem Relation Age of Onset   • Lipids Father         Hyperlipidemia; per NG   • Heart Disease Father         per NG   • Diabetes Maternal Grandfather         per NG   • Cancer Paternal Grandfather         per NG injection 4 mg 4 mg Intravenous Q6H PRN   Alfuzosin HCl ER (UROXATRAL) 24 hr tab 10 mg 10 mg Oral Daily with breakfast   finasteride (PROSCAR) tab 5 mg 5 mg Oral Daily   HYDROcodone-acetaminophen (NORCO) 5-325 MG per tab 1 tablet 1 tablet Oral Q6H PRN delirium syndrome. At this point, I would recommend the following approach:      1. Focus on education and support. 2.  Psychotherapy focusing on insight orientation and encourage cooperation. 3.  Discontinue detox protocol. 4.  Discontinue sitter.

## 2019-05-12 NOTE — PROGRESS NOTES
Shannon Barnes is a 68year old   male with history of hypertension, hyperlipidemia, GERD and alcoholism who presented to the hospital with obstructive uropathy and found with acute renal insufficiency.   The patient seen today for over 35 Maternal Grandfather         per NG   • Cancer Paternal Grandfather         per NG      Social History: Social History    Tobacco Use      Smoking status: Former Smoker        Packs/day: 1.00        Years: 25.00        Pack years: 25        Types: Cigarett HYDROcodone-acetaminophen (NORCO) 5-325 MG per tab 1 tablet 1 tablet Oral Q6H PRN       Allergies:    Ramipril                ANGIOEDEMA    Comment:Swelling to upper lip.     Laboratory Data:  Lab Results   Component Value Date    WBC 5.7 05/09/2019    HG encourage cooperation. 3.  Continue detox protocol. 4.  Change Librium to 10 mg 4 times daily as needed for withdrawal.  5.  Continue Depakote 250 mg twice daily. 6.  Continue Seroquel 50 mg nightly.   7.  Follow-up during this admission    Orders This V

## 2019-05-13 PROCEDURE — 99232 SBSQ HOSP IP/OBS MODERATE 35: CPT | Performed by: HOSPITALIST

## 2019-05-13 PROCEDURE — 99233 SBSQ HOSP IP/OBS HIGH 50: CPT | Performed by: OTHER

## 2019-05-13 RX ORDER — MAGNESIUM OXIDE 400 MG (241.3 MG MAGNESIUM) TABLET
800 TABLET ONCE
Status: COMPLETED | OUTPATIENT
Start: 2019-05-13 | End: 2019-05-13

## 2019-05-13 RX ORDER — POTASSIUM CHLORIDE 20 MEQ/1
40 TABLET, EXTENDED RELEASE ORAL ONCE
Status: COMPLETED | OUTPATIENT
Start: 2019-05-13 | End: 2019-05-13

## 2019-05-13 NOTE — CM/SW NOTE
Recommendations are for SNF. SW contacted wife/Al to discuss. Mariajose Logloria states she is agreeable, but pt is not. Wife requested for SW to email SNF list to her to start reviewing - SNF list sent via email.  Wife stated she would be to hospital later and

## 2019-05-13 NOTE — OCCUPATIONAL THERAPY NOTE
OCCUPATIONAL THERAPY ReEVALUATION - INPATIENT     Room Number: 556/556-A  Evaluation Date: 5/13/2019  Type of Evaluation: Re-evaluation  Presenting Problem: acute renal insufficiency    Physician Order: IP Consult to Occupational Therapy  Reason for [facility]) with call light in reach and all needs met. Alarm on. Notified RN. DISCHARGE RECOMMENDATIONS  OT Discharge Recommendations: Sub-acute rehabilitation  OT Device Recommendations: None    PLAN  OT Treatment Plan: Balance activities; Energy conservation/wor risk    PAIN ASSESSMENT  Ratin       COGNITION  Orientation Level:  oriented to place, oriented to situation and oriented to person  MiniCog: patient score 3/5    RANGE OF MOTION   Upper extremity ROM is within functional limits     STRENGTH ASSESSMENT with SBA  Comment:        Goals  on:   Frequency: 3-5x a week    [facility] MOT/R  [address] [place] [address]  #[id number]

## 2019-05-13 NOTE — PROGRESS NOTES
[facility]  Hospitalist Progress Note     Khrisvandana Quinonesker Patient Status:  Inpatient    1945  68year old CSN [id number]   Location 556/556-A Attending Mandeep Clifford MD   Hosp Day # 7 PCP Marietta Hutchins, DO     ASSESSMENT/PLAN    Leotha Leyden Behaving normally. Moving all 4 extremities. MS: No joint effusions. No peripheral edema. Skin: Skin is warm and dry. No rashes, erythema, diaphoresis. Psych: Normal mood and affect.  Calm, cooperative    Labs:  Recent Labs   Lab 05/08/19  0419 05/09/

## 2019-05-13 NOTE — PHYSICAL THERAPY NOTE
PHYSICAL THERAPY TREATMENT NOTE - INPATIENT     Room Number: 556/556-A       Presenting Problem: urinary retention with Acute renal insufficiency --memory loss / ABD distention --BPH hematuria .        Problem List  Principal Problem:    Acute renal insuffi impairment may benefit from rehab stay. Pt with improved mobility today, still requires MIN-MOD A for mobility d/t poor balance and impaired safety awareness. Pt is HIGH FALL RISK and is NOT SAFE for d/c home at this time.  PT recommendation sub-acute rehab (AM-PAC Scale): 40.78   CMS Modifier (G-Code): CK    FUNCTIONAL ABILITY STATUS  Gait Assessment   Gait Assistance: Minimum assistance; Moderate assistance  Distance (ft): 75' x 1  Assistive Device: Rolling walker  Pattern: Shuffle(mild path deviations, impa

## 2019-05-13 NOTE — PLAN OF CARE
Problem: CARDIOVASCULAR - ADULT  Goal: Maintains optimal cardiac output and hemodynamic stability  Description  INTERVENTIONS:  - Monitor vital signs, rhythm, and trends  - Monitor for bleeding, hypotension and signs of decreased cardiac output  - Evalua ADULT  Goal: Maintains hematologic stability  Description  INTERVENTIONS  - Assess for signs and symptoms of bleeding or hemorrhage  - Monitor labs and vital signs for trends  - Administer supportive blood products/factors, fluids and medications as ordere position. Call light within reach and bed alarm on. Monitoring continues.

## 2019-05-13 NOTE — BH PROGRESS NOTE
Behavioral Health Note  CHIEF COMPLAINT  Obstructive uropathy, acute renal failure, and urinary retention     REASON FOR ADMISSION  Obstructive uropathy, acute renal failure, and urinary retention     SOCIAL HISTORY  Dasia Solo lives at home with his wife and ASSESSMENT  No SI/HI  ASSESSMENT  The patient has a dx alcohol withdrawal with delirium, r/o mood disorder.      PLAN  1. Psych consult completed. 2. Referrals left at bedside, however patient is not interested in sobriety.    3. Case discussed with Dr. Arnold Chan

## 2019-05-14 PROCEDURE — 99233 SBSQ HOSP IP/OBS HIGH 50: CPT | Performed by: OTHER

## 2019-05-14 PROCEDURE — 99232 SBSQ HOSP IP/OBS MODERATE 35: CPT | Performed by: HOSPITALIST

## 2019-05-14 RX ORDER — DIVALPROEX SODIUM 250 MG/1
250 TABLET, DELAYED RELEASE ORAL 2 TIMES DAILY
Refills: 0 | Status: SHIPPED | COMMUNITY
Start: 2019-05-14 | End: 2019-06-10

## 2019-05-14 RX ORDER — MELATONIN
100 DAILY
Qty: 30 TABLET | Refills: 0 | Status: SHIPPED | OUTPATIENT
Start: 2019-05-15 | End: 2019-08-23

## 2019-05-14 RX ORDER — PANTOPRAZOLE SODIUM 40 MG/1
40 TABLET, DELAYED RELEASE ORAL
Qty: 30 TABLET | Refills: 0 | Status: SHIPPED | OUTPATIENT
Start: 2019-05-15 | End: 2019-06-10

## 2019-05-14 RX ORDER — FOLIC ACID 1 MG/1
1 TABLET ORAL DAILY
Qty: 30 TABLET | Refills: 0 | Status: SHIPPED | OUTPATIENT
Start: 2019-05-15 | End: 2019-08-23

## 2019-05-14 RX ORDER — ALFUZOSIN HYDROCHLORIDE 10 MG/1
10 TABLET, EXTENDED RELEASE ORAL
Qty: 30 TABLET | Refills: 0 | Status: SHIPPED | OUTPATIENT
Start: 2019-05-15 | End: 2019-08-07

## 2019-05-14 RX ORDER — MAGNESIUM OXIDE 400 MG (241.3 MG MAGNESIUM) TABLET
400 TABLET ONCE
Status: COMPLETED | OUTPATIENT
Start: 2019-05-14 | End: 2019-05-14

## 2019-05-14 RX ORDER — QUETIAPINE 50 MG/1
50 TABLET, FILM COATED ORAL NIGHTLY
Refills: 0 | Status: SHIPPED | COMMUNITY
Start: 2019-05-14 | End: 2019-06-10

## 2019-05-14 RX ORDER — FINASTERIDE 5 MG/1
5 TABLET, FILM COATED ORAL DAILY
Qty: 30 TABLET | Refills: 0 | Status: SHIPPED | OUTPATIENT
Start: 2019-05-15 | End: 2019-08-07

## 2019-05-14 RX ORDER — SODIUM CHLORIDE 9 MG/ML
INJECTION, SOLUTION INTRAVENOUS CONTINUOUS
Status: DISPENSED | OUTPATIENT
Start: 2019-05-14 | End: 2019-05-15

## 2019-05-14 NOTE — PLAN OF CARE
Patient made multiple attempts to get out of bed. Pt reoriented and agrees to stay in bed. Referrals made to [facility] and [place].  Wife agreeable to rehab, pt not able to make appropriate decisions on own      Problem: Patient Centered Care  Goal: Patient prefere Progressing     Problem: GENITOURINARY - ADULT  Goal: Absence of urinary retention  Description  INTERVENTIONS:  - Assess patient?s ability to void and empty bladder  - Monitor intake/output and perform bladder scan as needed  - Follow urinary retention pr venipuncture sites to achieve adequate hemostasis  - Assess for signs and symptoms of internal bleeding  - Monitor lab trends  Outcome: Progressing     Problem: Safety Risk - Non-Violent Restraints  Goal: Patient will remain free from self-harm  [address]cript[address] 98

## 2019-05-14 NOTE — PROGRESS NOTES
Criss Smith is a 68year old   male with history of hypertension, hyperlipidemia, GERD and alcoholism who presented to the hospital with obstructive uropathy and found with acute renal insufficiency.   The patient seen today for over 35 History    Tobacco Use      Smoking status: Former Smoker        Packs/day: 1.00        Years: 25.00        Pack years: 25        Types: Cigarettes      Smokeless tobacco: Never Used      Tobacco comment: Quit 15-20 years ago    Alcohol use:  Yes      Alcoh ANGIOEDEMA    Comment:Swelling to upper lip.     Laboratory Data:  Lab Results   Component Value Date    WBC 5.7 05/13/2019    HGB 9.8 (L) 05/13/2019    HCT 29.5 (L) 05/13/2019    .0 05/13/2019    CREATSERUM 1.69 (H) 05/13/2019    BUN 24 (H) [facility]    Orders This Visit:  Orders Placed This Encounter      Urinalysis with Culture Reflex STAT      CBC With Differential With Platelet      Basic Metabolic Panel (8)      Magnesium      Basic Metabolic Panel (8)      CBC With Differential

## 2019-05-14 NOTE — PROGRESS NOTES
[facility]    Progress Note    Ana María Grieve Patient Status:  Inpatient    1945 MRN [id number]   Location [facility] 5SW/SE Attending Kenny Sims MD   Hosp Day # 8 PCP Shabbir Medrano DO           Attending Addendum Kristy Zhou advised that he needs to go rehab and going home is not an option.     Urinary retention. Resolved with Chery catheter.  -Outpatient urology follow-up for voiding trial     Gross hematuria. Due to rapid decompression of the bladder.   Resolved.     Acute

## 2019-05-14 NOTE — CM/SW NOTE
SW met w/ pt and pt's wife to follow up on d/c plans. Per psych, pt is not able to make own decisions and recommendation is for SNF. Wife requesting referrals to [address] [place] [address]. Sw placed referrals.     Judy Share, [address]

## 2019-05-14 NOTE — PHYSICAL THERAPY NOTE
PHYSICAL THERAPY TREATMENT NOTE - INPATIENT     Room Number: 556/556-A       Presenting Problem: urinary retention with Acute renal insufficiency --memory loss / ABD distention --BPH hematuria .        Problem List  Principal Problem:    Acute renal insuffi (including adjusting bedclothes, sheets and blankets)?: A Little   -   Sitting down on and standing up from a chair with arms (e.g., wheelchair, bedside commode, etc.): A Little   -   Moving from lying on back to sitting on the side of the bed?: A Little demonstrate independence with home activity/exercise instructions provided to patient in preparation for discharge.    Goal #5   Current Status In progress    Goal #6     Goal #6  Current Status

## 2019-05-15 ENCOUNTER — APPOINTMENT (OUTPATIENT)
Dept: ULTRASOUND IMAGING | Facility: HOSPITAL | Age: 74
DRG: 683 | End: 2019-05-15
Attending: HOSPITALIST
Payer: MEDICARE

## 2019-05-15 VITALS
BODY MASS INDEX: 23.91 KG/M2 | OXYGEN SATURATION: 100 % | TEMPERATURE: 98 F | HEART RATE: 72 BPM | DIASTOLIC BLOOD PRESSURE: 68 MMHG | HEIGHT: 70 IN | WEIGHT: 167 LBS | SYSTOLIC BLOOD PRESSURE: 149 MMHG | RESPIRATION RATE: 18 BRPM

## 2019-05-15 PROCEDURE — 99233 SBSQ HOSP IP/OBS HIGH 50: CPT | Performed by: OTHER

## 2019-05-15 PROCEDURE — 99239 HOSP IP/OBS DSCHRG MGMT >30: CPT | Performed by: HOSPITALIST

## 2019-05-15 PROCEDURE — 76775 US EXAM ABDO BACK WALL LIM: CPT | Performed by: HOSPITALIST

## 2019-05-15 RX ORDER — POTASSIUM CHLORIDE 20 MEQ/1
40 TABLET, EXTENDED RELEASE ORAL ONCE
Status: COMPLETED | OUTPATIENT
Start: 2019-05-15 | End: 2019-05-15

## 2019-05-15 RX ORDER — MAGNESIUM OXIDE 400 MG (241.3 MG MAGNESIUM) TABLET
400 TABLET 2 TIMES DAILY WITH MEALS
Qty: 60 TABLET | Refills: 0 | Status: SHIPPED
Start: 2019-05-15 | End: 2019-08-23

## 2019-05-15 RX ORDER — MAGNESIUM OXIDE 400 MG (241.3 MG MAGNESIUM) TABLET
400 TABLET 2 TIMES DAILY WITH MEALS
Status: DISCONTINUED | OUTPATIENT
Start: 2019-05-15 | End: 2019-05-15

## 2019-05-15 RX ORDER — MAGNESIUM OXIDE 400 MG (241.3 MG MAGNESIUM) TABLET
400 TABLET ONCE
Status: COMPLETED | OUTPATIENT
Start: 2019-05-15 | End: 2019-05-15

## 2019-05-15 NOTE — CM/SW NOTE
[facility] unable to accept, [facility] able to accept    [facility] agreeable w/ 1p d/c time  RN aware of d/c time  Wife & pt aware of d/c time. Pt hesitant to go to rehab, but agreeable. Wife to transport pt over to facility.     Ky Swanson 50 # [phone]    Rosalyn Lesches

## 2019-05-15 NOTE — DISCHARGE SUMMARY
[facility]    Discharge Summary    Calvin Flores Patient Status:  Inpatient    1945 MRN [id number]   Location [facility] 5SW/SE Attending Fuad Varghese MD   Hosp Day # 9 PCP Jake Abdi DO     Date of Admission: creatinine of 2.85; GFR 21, which is way below his baseline. CBC was unremarkable; hemoglobin is 11, around his baseline. Urinalysis unremarkable. Attempts were made to insert a Chery catheter in the emergency room, with no success.   Ultrasound of the b Medications      START taking these medications      Instructions Prescription details   Alfuzosin HCl ER 10 MG Tb24  Commonly known as:  UROXATRAL      Take 1 tablet (10 mg total) by mouth daily with breakfast.   Quantity:  30 tablet  Refills:  0     diva [phone]   · Alfuzosin HCl ER 10 MG Tb24  · finasteride 5 MG Tabs  · folic acid 1 MG Tabs  · Pantoprazole Sodium 40 MG Tbec  · Thiamine HCl 100 MG Tabs           Discharge Instructions       Chery on d/c- keep until removed by urologist (1-2 weeks for

## 2019-05-15 NOTE — PROGRESS NOTES
Shannon Barnes is a 68year old   male with history of hypertension, hyperlipidemia, GERD and alcoholism who presented to the hospital with obstructive uropathy and found with acute renal insufficiency.   The patient seen today for over 35 • Essential hypertension    • Gastrointestinal complaints, nonspecific     Bowel problems; per NG   • Hearing impairment    • High blood pressure    • High cholesterol    • History of chickenpox     per NG   • History of fracture of arm     per NG   • Hi 648 05/08/2019         Imaging        Vitals   05/15/19  1014   BP: 149/68   Pulse: 72   Resp: 18   Temp: 97.7 °F (36.5 °C)       PHYSICAL EXAM:     The patient is alert to self, place and date.   Patient presented more appropriate in his interaction and so Potassium      Magnesium      CBC With Differential With Platelet      Basic Metabolic Panel (8)      Potassium      Magnesium      Magnesium      Basic Metabolic Panel (8)      Meds This Visit:  Requested Prescriptions     Signed Prescriptions Disp Refill

## 2019-05-15 NOTE — PROGRESS NOTES
Harish Sarkar is a 68year old   male with history of hypertension, hyperlipidemia, GERD and alcoholism who presented to the hospital with obstructive uropathy and found with acute renal insufficiency.   The patient seen today for over 35 fluctuation in cognition and mood he demonstrated inability to make appropriate decision for himself and he need to be an inpatient rehab for now.     Patient denied any homicidal or suicidal ideation      HISTORY:  Past Medical History:   Diagnosis Date injection 1 mg 1 mg Intravenous Q1H PRN   Or      LORazepam (ATIVAN) tab 2 mg 2 mg Oral Q1H PRN   Or      LORazepam (ATIVAN) injection 2 mg 2 mg Intravenous Q1H PRN   Thiamine HCl tab 100 mg 100 mg Oral Daily   multivitamin with minerals (ADULT) tab 1 tabl 05/14/19 2029   BP: 135/70   Pulse:    Resp: 18   Temp: 98.6 °F (37 °C)       PHYSICAL EXAM:     The patient is alert to self, place and date.   Patient presented more appropriate in his interaction and sound cooperative otherwise tearful and confused toda Metabolic Panel (8)      CBC With Differential With Platelet      Iron And Tibc      Ferritin      Vitamin B12      Potassium      Magnesium      Electrolyte Panel      Drug Screen 7 W/confirmation, urine Once      Prothrombin Time (PT)      PTT, Activated

## 2019-05-15 NOTE — PROGRESS NOTES
Called C. Uday Saucedo will call me back for report. Addendum: Gave report to Uday Saucedo from [facility]. IV removed. Discharge paperwork provded to pt with wife at bedside. Wife to transport.

## 2019-05-20 ENCOUNTER — SNF ADMIT/H&P (OUTPATIENT)
Dept: INTERNAL MEDICINE CLINIC | Facility: SKILLED NURSING FACILITY | Age: 74
End: 2019-05-20

## 2019-05-20 DIAGNOSIS — IMO0001 ALCOHOLISM /ALCOHOL ABUSE: ICD-10-CM

## 2019-05-20 DIAGNOSIS — R33.9 URINARY RETENTION: ICD-10-CM

## 2019-05-20 DIAGNOSIS — I10 ESSENTIAL HYPERTENSION: ICD-10-CM

## 2019-05-20 PROCEDURE — 99309 SBSQ NF CARE MODERATE MDM 30: CPT | Performed by: NURSE PRACTITIONER

## 2019-05-20 NOTE — PROGRESS NOTES
HPI: Bárbara Alejandra  Is a 67 yo male who was admitted to [address] [place] [address] with distended bladder noted on abdominal xray. Urology consulted and Chery catheter was inserted after multiple attempts.  Also with delirium tremens 2/2 alcohol withdrawal however patient anibal Reviewed     SUBJECTIVE/REVIEW OF SYSTEMS:  Seen in room. Denies pain. Wants to go home. Reports that therapy is not helping him. Denies sob/cough/cp/palpitations. Afebrile. Discussed etoh use, reports he drinks \"3 garcia lites\" per day.  Denies having an

## 2019-05-21 ENCOUNTER — MED REC SCAN ONLY (OUTPATIENT)
Dept: FAMILY MEDICINE CLINIC | Facility: CLINIC | Age: 74
End: 2019-05-21

## 2019-05-21 ENCOUNTER — EXTERNAL FACILITY (OUTPATIENT)
Dept: FAMILY MEDICINE CLINIC | Facility: CLINIC | Age: 74
End: 2019-05-21

## 2019-05-21 DIAGNOSIS — R33.9 URINARY RETENTION: ICD-10-CM

## 2019-05-21 DIAGNOSIS — N28.9 ACUTE RENAL INSUFFICIENCY: ICD-10-CM

## 2019-05-21 PROCEDURE — 99305 1ST NF CARE MODERATE MDM 35: CPT | Performed by: FAMILY MEDICINE

## 2019-05-21 NOTE — PROGRESS NOTES
NIC[facility] lab [facility] extended care results from 5/20/19, put in nursing home folder, will be sent to scan

## 2019-05-22 ENCOUNTER — OFFICE VISIT (OUTPATIENT)
Dept: SURGERY | Facility: CLINIC | Age: 74
End: 2019-05-22
Payer: MEDICARE

## 2019-05-22 ENCOUNTER — SNF VISIT (OUTPATIENT)
Dept: INTERNAL MEDICINE CLINIC | Facility: SKILLED NURSING FACILITY | Age: 74
End: 2019-05-22

## 2019-05-22 VITALS
HEIGHT: 70 IN | WEIGHT: 167 LBS | BODY MASS INDEX: 23.91 KG/M2 | HEART RATE: 92 BPM | DIASTOLIC BLOOD PRESSURE: 69 MMHG | SYSTOLIC BLOOD PRESSURE: 108 MMHG

## 2019-05-22 DIAGNOSIS — R33.9 URINARY RETENTION: ICD-10-CM

## 2019-05-22 DIAGNOSIS — IMO0001 ALCOHOLISM /ALCOHOL ABUSE: ICD-10-CM

## 2019-05-22 DIAGNOSIS — R53.1 WEAKNESS: ICD-10-CM

## 2019-05-22 DIAGNOSIS — N40.1 BENIGN PROSTATIC HYPERPLASIA WITH LOWER URINARY TRACT SYMPTOMS, SYMPTOM DETAILS UNSPECIFIED: Primary | ICD-10-CM

## 2019-05-22 DIAGNOSIS — Z97.8 FOLEY CATHETER IN PLACE: ICD-10-CM

## 2019-05-22 PROCEDURE — 99308 SBSQ NF CARE LOW MDM 20: CPT | Performed by: NURSE PRACTITIONER

## 2019-05-22 PROCEDURE — 99213 OFFICE O/P EST LOW 20 MIN: CPT | Performed by: NURSE PRACTITIONER

## 2019-05-22 PROCEDURE — G0463 HOSPITAL OUTPT CLINIC VISIT: HCPCS | Performed by: NURSE PRACTITIONER

## 2019-05-22 NOTE — PROGRESS NOTES
HPI: Armando Fernandes  Is a 67 yo male who was admitted to [address] [place] [address] with distended bladder noted on abdominal xray. Urology consulted and Chery catheter was inserted after multiple attempts.  Also with delirium tremens 2/2 alcohol withdrawal however patient anibal commands  PSYCHIATRIC: alert and oriented x 3, judgement questionable; affect appropriate           ASSESSMENT/PLAN  F/U urology Dr Nas Sanches for voiding trial - 5/22  Continue pt/ot  No s/s withdrawal       Delirium tremens.  Resolved.  A/OX3 currently  Beau Vega

## 2019-05-22 NOTE — PROGRESS NOTES
HPI:    Patient ID: Elia Kinsey is a 68year old male. HPI     Patient is a 68year old male who presents to the clinic for a follow up. He was recently hospitalized and seen for consult on 5/6/19 with Dr. Kimberley Mendez.   He was found to have ALTA with tablet (100 mg total) by mouth daily. Disp: 30 tablet Rfl: 0   folic acid 1 MG Oral Tab Take 1 tablet (1 mg total) by mouth daily. Disp: 30 tablet Rfl: 0   finasteride 5 MG Oral Tab Take 1 tablet (5 mg total) by mouth daily.  Disp: 30 tablet Rfl: 0   Alfuzo per NG   • Cancer Paternal Grandfather         per NG      Social History: Social History    Tobacco Use      Smoking status: Former Smoker        Packs/day: 1.00        Years: 25.00        Pack years: 25        Types: Cigarettes      Smokeless toba Prescriptions      No prescriptions requested or ordered in this encounter       Imaging & Referrals:  None        CO#[id number]

## 2019-05-28 ENCOUNTER — TELEPHONE (OUTPATIENT)
Dept: SURGERY | Facility: CLINIC | Age: 74
End: 2019-05-28

## 2019-05-30 ENCOUNTER — SNF VISIT (OUTPATIENT)
Dept: INTERNAL MEDICINE CLINIC | Facility: SKILLED NURSING FACILITY | Age: 74
End: 2019-05-30

## 2019-05-30 DIAGNOSIS — R53.1 WEAKNESS: ICD-10-CM

## 2019-05-30 DIAGNOSIS — IMO0001 ALCOHOLISM /ALCOHOL ABUSE: ICD-10-CM

## 2019-05-30 DIAGNOSIS — R33.9 URINARY RETENTION: ICD-10-CM

## 2019-05-30 PROCEDURE — 99309 SBSQ NF CARE MODERATE MDM 30: CPT | Performed by: NURSE PRACTITIONER

## 2019-05-30 NOTE — PROGRESS NOTES
HPI: Sumeet Keith  Is a 69 yo male who was admitted to [facility] with distended bladder noted on abdominal xray. Urology consulted and Chery catheter was inserted after multiple attempts.  Also with delirium tremens 2/2 alcohol withdrawal however patient anibal sensorimotor deficit, follows commands  PSYCHIATRIC: alert and oriented x 3, judgement questionable/forgetful; affect appropriate           ASSESSMENT/PLAN  F/U urology Dr Ange Reza for voiding trial - 5/22, still has finley in, f/u with urologist again 6/19

## 2019-05-31 PROCEDURE — 99308 SBSQ NF CARE LOW MDM 20: CPT | Performed by: FAMILY MEDICINE

## 2019-05-31 NOTE — TELEPHONE ENCOUNTER
Spoke to patients wife and was upset that no one had called her back in regards to scheduling follow up appointment for catheter removal.  Made an appointment for 6/10/19 with [facility]

## 2019-05-31 NOTE — TELEPHONE ENCOUNTER
Pt wife called and wants to know when can cath be removed from pt because he has had it for 4 wks.  pls call thank you

## 2019-06-03 ENCOUNTER — TELEPHONE (OUTPATIENT)
Dept: FAMILY MEDICINE CLINIC | Facility: CLINIC | Age: 74
End: 2019-06-03

## 2019-06-03 ENCOUNTER — PATIENT OUTREACH (OUTPATIENT)
Dept: CASE MANAGEMENT | Age: 74
End: 2019-06-03

## 2019-06-03 DIAGNOSIS — Z02.9 ENCOUNTERS FOR ADMINISTRATIVE PURPOSE: ICD-10-CM

## 2019-06-03 NOTE — TELEPHONE ENCOUNTER
HERBERTH: Dr Bozena Flores see [facility] below is the appt good enough? pls advise, thank darek.       Please reply to pool: CHRISTOPH Plummer

## 2019-06-03 NOTE — TELEPHONE ENCOUNTER
[address]  infoming PCP Loida that Pt starting week of 06/3/19 is going to have RN Visits/ PT and starting week of 6/10 OT.

## 2019-06-03 NOTE — TELEPHONE ENCOUNTER
Attempted to contact pt for TCM, had to leave a message to call back. Pt currently has an appt scheduled on 6/10/19 for a FU visit post rehab however TCM/HFU appt for  are to be for 30 mins and VT- 6010.   Please follow up to see if the appt an be

## 2019-06-04 ENCOUNTER — TELEPHONE (OUTPATIENT)
Dept: FAMILY MEDICINE CLINIC | Facility: CLINIC | Age: 74
End: 2019-06-04

## 2019-06-04 ENCOUNTER — TELEPHONE (OUTPATIENT)
Dept: SURGERY | Facility: CLINIC | Age: 74
End: 2019-06-04

## 2019-06-04 NOTE — PROGRESS NOTES
Spouse Criss Contreras called back and LM to call her back. LM for pt's spouse to call NCM back for TCM.

## 2019-06-04 NOTE — PROGRESS NOTES
Spouse called back and LM stating she received a VM to come to the appt on 6/10 at 1:30 instead of 1:40. Spouse stated that is fine, the pt will be at the appt for 1:30.  Tried to call spouse back for TCM, call went to  again, left message to call NCM michelle

## 2019-06-04 NOTE — TELEPHONE ENCOUNTER
ben physical therapist from residential home health called. Pt is stating pt is having pain from the cath. 7 out of 10. Pt has upcoming appt 6-10-19. Spoke to pcp's office and told pt should be seen sooner.   Please call to advise

## 2019-06-04 NOTE — TELEPHONE ENCOUNTER
I tried to reach the PT from [facility] and LM that I will try to reach pt, spouse or dtr. I called the pt on the hm # listed and LMTCB. I then tried the cell # listed and spoke with pt and then his spouse.  I explained the plan instructions in [facility]'

## 2019-06-04 NOTE — TELEPHONE ENCOUNTER
Zulma with [facility], following up on behalf of patient, pt currently has a cath in place, following up, reported they spoke with someone over a week ago regarding his cath and symptoms, advised Félix Richards only follow up noted is with Urology, pt has follow up

## 2019-06-06 ENCOUNTER — OFFICE VISIT (OUTPATIENT)
Dept: SURGERY | Facility: CLINIC | Age: 74
End: 2019-06-06
Payer: MEDICARE

## 2019-06-06 VITALS
HEART RATE: 99 BPM | DIASTOLIC BLOOD PRESSURE: 75 MMHG | TEMPERATURE: 98 F | WEIGHT: 175 LBS | SYSTOLIC BLOOD PRESSURE: 110 MMHG | BODY MASS INDEX: 25 KG/M2

## 2019-06-06 DIAGNOSIS — R33.9 URINARY RETENTION: ICD-10-CM

## 2019-06-06 DIAGNOSIS — N40.1 BENIGN PROSTATIC HYPERPLASIA WITH URINARY OBSTRUCTION: ICD-10-CM

## 2019-06-06 DIAGNOSIS — Z97.8 FOLEY CATHETER IN PLACE: Primary | ICD-10-CM

## 2019-06-06 DIAGNOSIS — N13.8 BENIGN PROSTATIC HYPERPLASIA WITH URINARY OBSTRUCTION: ICD-10-CM

## 2019-06-06 PROCEDURE — 99213 OFFICE O/P EST LOW 20 MIN: CPT | Performed by: NURSE PRACTITIONER

## 2019-06-06 PROCEDURE — G0463 HOSPITAL OUTPT CLINIC VISIT: HCPCS | Performed by: NURSE PRACTITIONER

## 2019-06-06 NOTE — PROGRESS NOTES
HPI:    Patient ID: Mazin Moe is a 68year old male. HPI     Patient is a 68year old male who presents to the clinic for a follow up. He was recently hospitalized and seen for consult on 5/6/19 with Dr. Trina Strong.   He was found to have ALTA w HCl ER 10 MG Oral Tablet 24 Hr Take 1 tablet (10 mg total) by mouth daily with breakfast. Disp: 30 tablet Rfl: 0   QUEtiapine Fumarate 50 MG Oral Tab Take 1 tablet (50 mg total) by mouth nightly.  Disp:  Rfl: 0   divalproex Sodium 250 MG Oral Tab EC DR tab Never Used      Tobacco comment: Quit 15-20 years ago    Alcohol use: Yes      Alcohol/week: 1.8 - 3.0 oz      Types: 3 - 5 Glasses of wine per week      Comment: Drinks daily.     Drug use: No       PHYSICAL EXAM:    Physical Exam   Constitutional: He is o ml of urine in bladder. At this time I gave patient the option of continuing to wait in the office and see if he could void vs replacing finley catheter.   Patient decided to have finley catheter replaced.       Using sterile technique I placed a 16 fr coude

## 2019-06-10 ENCOUNTER — TELEPHONE (OUTPATIENT)
Dept: SURGERY | Facility: CLINIC | Age: 74
End: 2019-06-10

## 2019-06-10 ENCOUNTER — TELEPHONE (OUTPATIENT)
Dept: FAMILY MEDICINE CLINIC | Facility: CLINIC | Age: 74
End: 2019-06-10

## 2019-06-10 ENCOUNTER — OFFICE VISIT (OUTPATIENT)
Dept: FAMILY MEDICINE CLINIC | Facility: CLINIC | Age: 74
End: 2019-06-10
Payer: MEDICARE

## 2019-06-10 VITALS
DIASTOLIC BLOOD PRESSURE: 74 MMHG | RESPIRATION RATE: 16 BRPM | HEIGHT: 70 IN | HEART RATE: 108 BPM | TEMPERATURE: 97 F | SYSTOLIC BLOOD PRESSURE: 117 MMHG | BODY MASS INDEX: 22.16 KG/M2 | WEIGHT: 154.81 LBS

## 2019-06-10 DIAGNOSIS — N17.9 AKI (ACUTE KIDNEY INJURY) (HCC): ICD-10-CM

## 2019-06-10 DIAGNOSIS — F39 EPISODIC MOOD DISORDER (HCC): ICD-10-CM

## 2019-06-10 DIAGNOSIS — Z97.8 INDWELLING FOLEY CATHETER PRESENT: ICD-10-CM

## 2019-06-10 DIAGNOSIS — I25.10 ATHEROSCLEROSIS OF NATIVE CORONARY ARTERY OF NATIVE HEART WITHOUT ANGINA PECTORIS: ICD-10-CM

## 2019-06-10 DIAGNOSIS — F32.A ANXIETY AND DEPRESSION: ICD-10-CM

## 2019-06-10 DIAGNOSIS — IMO0001 ALCOHOLISM /ALCOHOL ABUSE: ICD-10-CM

## 2019-06-10 DIAGNOSIS — R33.9 URINARY RETENTION: Primary | ICD-10-CM

## 2019-06-10 DIAGNOSIS — F41.9 ANXIETY AND DEPRESSION: ICD-10-CM

## 2019-06-10 PROCEDURE — 99495 TRANSJ CARE MGMT MOD F2F 14D: CPT | Performed by: FAMILY MEDICINE

## 2019-06-10 PROCEDURE — 1111F DSCHRG MED/CURRENT MED MERGE: CPT | Performed by: FAMILY MEDICINE

## 2019-06-10 RX ORDER — ESCITALOPRAM OXALATE 10 MG/1
TABLET ORAL
Refills: 0 | COMMUNITY
Start: 2019-05-06 | End: 2019-06-10

## 2019-06-10 RX ORDER — CIPROFLOXACIN 500 MG/1
500 TABLET, FILM COATED ORAL 2 TIMES DAILY
Qty: 14 TABLET | Refills: 0 | Status: SHIPPED | OUTPATIENT
Start: 2019-06-10 | End: 2019-06-17

## 2019-06-10 RX ORDER — RAMIPRIL 5 MG/1
CAPSULE ORAL
Refills: 0 | COMMUNITY
Start: 2019-05-18 | End: 2019-06-10

## 2019-06-10 RX ORDER — TAMSULOSIN HYDROCHLORIDE 0.4 MG/1
CAPSULE ORAL
Refills: 0 | COMMUNITY
Start: 2019-05-06 | End: 2019-06-10

## 2019-06-10 RX ORDER — PANTOPRAZOLE SODIUM 40 MG/1
40 TABLET, DELAYED RELEASE ORAL
Qty: 90 TABLET | Refills: 1 | Status: SHIPPED | OUTPATIENT
Start: 2019-06-10 | End: 2019-07-15

## 2019-06-10 NOTE — TELEPHONE ENCOUNTER
Spoke with patient--states VS gave him the ok to drive--message sent to VS for clarification--wife wants to hear from VS if ok to drive.     Please advise

## 2019-06-10 NOTE — TELEPHONE ENCOUNTER
Attempted to call patient's wife to discuss situation. Unable to leave VM on home #, mailbox full. Called patient's wife's cellphone and LMTCB. Dr. Dill Favors, was patient cleared to drive?

## 2019-06-10 NOTE — TELEPHONE ENCOUNTER
Urine culture positive. Plan to treat with Cipro 500 mg PO BID x 7 days. Attempted to call patient to let him know. Also sent my chart message. Staff can we just make sure patient go the message and will start antibiotics.   He has follow up with RAVEN Engle

## 2019-06-10 NOTE — PATIENT INSTRUCTIONS
Go ahead and stop the Depakote and Seroquel as those really need to be written by a psychiatrist.  If you do start feeling depressed or anxious then please let us know so we can get you back into psychiatry and get those treated.   Please abstain from [facility] ([facility])

## 2019-06-10 NOTE — PROGRESS NOTES
Patient ID: Bárbara Alejandra is a 68year old male. HPI  Patient presents with:  Hospital F/U: tcm, urinary issues     HPI from Discharge Summary on 5/15/19.       The patient is a 46-year-old  male who came in today to the emergency department with psychiatry. Pt does not think he is taking escitalopram 10 mg. He does not think he needs to take medication for anxiety. After the hospital visit pt started taking Alfuzosin, Depakote, Proscar, Folvite, Mag-Ox, Protonix, Seroquel, and Thiamine.  P Laterality Date   • CABG  2007    per NG   • CATARACT     • TONSILLECTOMY            Current Outpatient Medications:  escitalopram 10 MG Oral Tab  Disp:  Rfl: 0   ramipril 5 MG Oral Cap  Disp:  Rfl: 0   tamsulosin HCl 0.4 MG Oral Cap  Disp:  Rfl: 0   su well-nourished. No distress. HENT:   Head: Normocephalic. Eyes: Conjunctivae and EOM are normal.   Neck: Normal range of motion. No thyromegaly present. Cardiovascular: Normal rate, regular rhythm and normal heart sounds.     Pulmonary/Chest: Effort n Exercise as tolerated. Please see the urologist.          Follow up if symptoms persist.  Take medicine (if given) as prescribed. Approach to treatment discussed and patient/family member understands and agrees to plan.      Return in about 3 months (erinnu discharge medication list has been reconciled with the patient's current medication list and reviewed by me. See medication list for additions of new medication, and changes to current doses of medications and discontinued medications.     Transitional Car

## 2019-06-10 NOTE — PROGRESS NOTES
Several attempts made to reach the patient with no return call. Patient completed HFU on 6/10/2019. Closing encounter.

## 2019-06-10 NOTE — TELEPHONE ENCOUNTER
Pt's spouse [place] Person, nika KURTZ, called and LM for NCM to call her back to verify if the pt was given the okay to drive or not. Spouse stated the pt had an appt today with Dr. Lafaye Dubin and when he came home, told her he was given the okay to drive.  Spouse woul

## 2019-06-11 ENCOUNTER — TELEPHONE (OUTPATIENT)
Dept: FAMILY MEDICINE CLINIC | Facility: CLINIC | Age: 74
End: 2019-06-11

## 2019-06-11 ENCOUNTER — PATIENT MESSAGE (OUTPATIENT)
Dept: FAMILY MEDICINE CLINIC | Facility: CLINIC | Age: 74
End: 2019-06-11

## 2019-06-11 DIAGNOSIS — R29.898 WEAKNESS OF BOTH LOWER EXTREMITIES: ICD-10-CM

## 2019-06-11 DIAGNOSIS — IMO0001 ALCOHOLISM /ALCOHOL ABUSE: Primary | ICD-10-CM

## 2019-06-11 DIAGNOSIS — R26.81 GAIT INSTABILITY: ICD-10-CM

## 2019-06-11 DIAGNOSIS — Z97.8 INDWELLING FOLEY CATHETER PRESENT: ICD-10-CM

## 2019-06-11 DIAGNOSIS — N40.1 BENIGN PROSTATIC HYPERPLASIA WITH LOWER URINARY TRACT SYMPTOMS, SYMPTOM DETAILS UNSPECIFIED: ICD-10-CM

## 2019-06-11 NOTE — TELEPHONE ENCOUNTER
Spoke with wife ( on TIMBO) to inform her Dr. Suzi Monahan prefers [facility] Whitaker to determine driving privileges. Also spoke with patient, informed him of above. Provided telephone to Juhi Whitaker in [place].      Patient verbalizes understanding and agrees

## 2019-06-11 NOTE — TELEPHONE ENCOUNTER
Let wife know that I filled out a order for this and we will get this faxed over to [facility]. I thank her for her help and information.

## 2019-06-11 NOTE — TELEPHONE ENCOUNTER
Please confirm with them do they feel that he is coordinated enough to drive a car or does he have to go to [facility] to see if he is able to drive?

## 2019-06-11 NOTE — TELEPHONE ENCOUNTER
Pt's wife is calling states pt needs an order for him to have a driving evaluation they were provided with vicente and was told there is a 3 month waiting period for evaluation. Per spouse she called [facility] and they are able to get in on June 28 needs an order with specific diagnoses. Please fax. Fax: [phone].

## 2019-06-11 NOTE — TELEPHONE ENCOUNTER
From: Mecca Brock  To: Karissa Pardo DO  Sent: 6/11/2019 11:02 AM CDT  Subject: Other    Dr. Grabiel Guzman,    I contacted [facility] about a driving test and they said they needed a fax from you requesting an OT 's Evaluation and Treatment.  They will

## 2019-06-11 NOTE — TELEPHONE ENCOUNTER
Spoke to wife and she stated there is no concerns. Patient is getting home physical therapy per wife. Per wife, patient is active in the house and climbs stairs.      Wife however states patient had some thinking difficulties in rehab and was receiving

## 2019-06-11 NOTE — TELEPHONE ENCOUNTER
Does wife have a concern about him driving? He has done physical therapy and states he is not drinking alcohol now.

## 2019-06-11 NOTE — TELEPHONE ENCOUNTER
That is great to know. [facility] does a wonderful job making sure that patients are able to drive. I would rather him get clearance from them.

## 2019-06-11 NOTE — TELEPHONE ENCOUNTER
Patient contacted. Patient is aware of his test results, Neela MEZA's recommendation and verbalized understanding. Patient states he will comply. All questions answered.

## 2019-06-11 NOTE — TELEPHONE ENCOUNTER
Let him know that the rehabilitation therapist that was at the house felt that your cognition and thinking abilities are decreased. I would like you to see a neurologist.  There is a neurologist in our building on the third floor.   I went ahead and did a

## 2019-06-11 NOTE — TELEPHONE ENCOUNTER
Jacki Houston the physical therapist from [facility] stated that physically he is fine but cognitive he is not. She does not know if Pasha Tineo with deal with cognitive issues. She also stated his wife is also concerned.

## 2019-06-12 ENCOUNTER — TELEPHONE (OUTPATIENT)
Dept: SURGERY | Facility: CLINIC | Age: 74
End: 2019-06-12

## 2019-06-12 RX ORDER — CEFADROXIL 500 MG/1
500 CAPSULE ORAL 2 TIMES DAILY
Qty: 14 CAPSULE | Refills: 0 | Status: SHIPPED | OUTPATIENT
Start: 2019-06-12 | End: 2019-06-19

## 2019-06-12 NOTE — TELEPHONE ENCOUNTER
Received call from home health nurse stating Cipro has an interaction with his seroquel. I do not have seroquel listed as a medication on patients list but she stated he is taking it nightly. Will change antibiotic to Duricef 500 mg PO BID x 7 days.   She

## 2019-06-12 NOTE — TELEPHONE ENCOUNTER
Spoke with patient and relayed VS message below--patient verbalizes understanding and agreement. Relayed to patient referral names and contact # and transferred patient to schedule appt. Appt has been made--see below    OK to wait until 8/7/19?     Ap

## 2019-06-12 NOTE — TELEPHONE ENCOUNTER
LMTCB  Transfer to triage with the patient. I also contacted Khushboo Garduno the PT and let her know what Dr. Bhupendra West stated,. She stated her last day was yesterday.

## 2019-06-13 NOTE — TELEPHONE ENCOUNTER
I thought his wife found out that [facility] also does the driving evaluation and would possibly be able to get him in sooner. Would he like to go there instead?

## 2019-06-13 NOTE — TELEPHONE ENCOUNTER
Perhaps this is if he waits to see them at the [address] [place] [address] office but I think if he would see any of the neurologist at any of the office as he would most likely get in much sooner.   He should call the neurology department and see if they can get him in sooner

## 2019-06-13 NOTE — TELEPHONE ENCOUNTER
Advised patient of Dr. Johnita Boas note. Patient indicated that it looks like [place] in September 2019 is the soonest, but will keep looking.

## 2019-06-14 NOTE — TELEPHONE ENCOUNTER
Faxed order to [phone] as requested, Spoke to Harriet Medina from [facility] and confirmed they received it, however is requesting for Dr Calvin Orta to state order is for \" occupational therapy for 's evaluation\"  On the order. Dr Calvin Orta , please advise.

## 2019-06-14 NOTE — TELEPHONE ENCOUNTER
[facility] requesting order for Occupational therapy for a drivers evaluation to be re-faxed as was not received.      Please fax to [phone]

## 2019-06-14 NOTE — TELEPHONE ENCOUNTER
See the letter with new information on it. Please fax to the number stated at [facility].

## 2019-06-19 ENCOUNTER — OFFICE VISIT (OUTPATIENT)
Dept: SURGERY | Facility: CLINIC | Age: 74
End: 2019-06-19
Payer: MEDICARE

## 2019-06-19 VITALS
DIASTOLIC BLOOD PRESSURE: 74 MMHG | SYSTOLIC BLOOD PRESSURE: 120 MMHG | TEMPERATURE: 98 F | BODY MASS INDEX: 22 KG/M2 | HEART RATE: 98 BPM | WEIGHT: 153 LBS

## 2019-06-19 DIAGNOSIS — R33.9 URINARY RETENTION: ICD-10-CM

## 2019-06-19 DIAGNOSIS — N39.0 URINARY TRACT INFECTION ASSOCIATED WITH INDWELLING URETHRAL CATHETER, SUBSEQUENT ENCOUNTER: ICD-10-CM

## 2019-06-19 DIAGNOSIS — N13.8 BENIGN PROSTATIC HYPERPLASIA WITH URINARY OBSTRUCTION: Primary | ICD-10-CM

## 2019-06-19 DIAGNOSIS — T83.511D URINARY TRACT INFECTION ASSOCIATED WITH INDWELLING URETHRAL CATHETER, SUBSEQUENT ENCOUNTER: ICD-10-CM

## 2019-06-19 DIAGNOSIS — N40.1 BENIGN PROSTATIC HYPERPLASIA WITH URINARY OBSTRUCTION: Primary | ICD-10-CM

## 2019-06-19 PROBLEM — T83.511A URINARY TRACT INFECTION ASSOCIATED WITH INDWELLING URETHRAL CATHETER: Status: ACTIVE | Noted: 2019-06-19

## 2019-06-19 PROBLEM — T83.511A URINARY TRACT INFECTION ASSOCIATED WITH INDWELLING URETHRAL CATHETER (HCC): Status: ACTIVE | Noted: 2019-06-19

## 2019-06-19 PROBLEM — T83.511A URINARY TRACT INFECTION ASSOCIATED WITH INDWELLING URETHRAL CATHETER  (HCC): Status: ACTIVE | Noted: 2019-06-19

## 2019-06-19 PROCEDURE — G0463 HOSPITAL OUTPT CLINIC VISIT: HCPCS | Performed by: UROLOGY

## 2019-06-19 PROCEDURE — 99213 OFFICE O/P EST LOW 20 MIN: CPT | Performed by: UROLOGY

## 2019-06-19 NOTE — PROGRESS NOTES
[facility] Urology  Follow-Up Visit    HPI: Adiel Carmona is a 68year old male presents for a follow up visit. Patient was last seen by Zackery March on 6/6/2019.      INTERVAL HISTORY:   - Saw Qamar Rader 6/6/2019 for a voiding trial, which he faile retired. Reviewed past medical, surgical, family, and social history. Reviewed med list and allergies. REVIEW OF SYSTEMS:  Review of Systems   Constitutional: Negative for chills and fever. HENT: Negative for sore throat.     Respiratory: Negative If he fails second voiding trial, either catheter gets re-inserted and patient scheduled for UDS or he learns CIC and gets scheduled for UDS.  Further management (continued CIC vs. suprapubic catheter placement vs. TURP) will be based on the UDS results

## 2019-06-20 ENCOUNTER — APPOINTMENT (OUTPATIENT)
Dept: LAB | Age: 74
End: 2019-06-20
Attending: UROLOGY
Payer: MEDICARE

## 2019-06-20 ENCOUNTER — TELEPHONE (OUTPATIENT)
Dept: FAMILY MEDICINE CLINIC | Facility: CLINIC | Age: 74
End: 2019-06-20

## 2019-06-20 ENCOUNTER — TELEPHONE (OUTPATIENT)
Dept: SURGERY | Facility: CLINIC | Age: 74
End: 2019-06-20

## 2019-06-20 DIAGNOSIS — B96.5 PSEUDOMONAS URINARY TRACT INFECTION: Primary | ICD-10-CM

## 2019-06-20 DIAGNOSIS — N13.8 BENIGN PROSTATIC HYPERPLASIA WITH URINARY OBSTRUCTION: ICD-10-CM

## 2019-06-20 DIAGNOSIS — N40.1 BENIGN PROSTATIC HYPERPLASIA WITH URINARY OBSTRUCTION: ICD-10-CM

## 2019-06-20 DIAGNOSIS — N39.0 PSEUDOMONAS URINARY TRACT INFECTION: Primary | ICD-10-CM

## 2019-06-20 PROCEDURE — 36415 COLL VENOUS BLD VENIPUNCTURE: CPT

## 2019-06-20 PROCEDURE — 80048 BASIC METABOLIC PNL TOTAL CA: CPT

## 2019-06-20 RX ORDER — CIPROFLOXACIN 500 MG/1
500 TABLET, FILM COATED ORAL 2 TIMES DAILY
Qty: 14 TABLET | Refills: 0 | Status: SHIPPED | OUTPATIENT
Start: 2019-06-20 | End: 2019-06-27

## 2019-06-20 NOTE — TELEPHONE ENCOUNTER
Spoke to patient over the phone. Informed him of repeat urine culture showing persistent pseudomonas UTI. Will treat wit Cipro 500 mg PO BID x 7 days. Sent to [email or web address] on file. Patient verbalized understanding.      Debi Evans MD   06/20/19 6:49

## 2019-06-20 NOTE — TELEPHONE ENCOUNTER
Received fax from [facility] for home health order to be singed. Placed on MD's desk.      Order [id number]

## 2019-06-20 NOTE — TELEPHONE ENCOUNTER
Wilfrid Valero OT/[facility] called in stating that Pt is being discharged with strictly OT services and Pt is refusing them at this time. Please be aware.

## 2019-06-20 NOTE — PROGRESS NOTES
Patient's name and  verified, finley clamped. After 10 min unclamped finley, collected sterile urine specimen per physician's order, sent to lab for urinalysis and urine culture.   Stat lock placed on patient's upper inner left thigh so that there is slack

## 2019-06-25 ENCOUNTER — HOSPITAL ENCOUNTER (EMERGENCY)
Facility: HOSPITAL | Age: 74
Discharge: HOME | End: 2019-06-25
Attending: EMERGENCY MEDICINE
Payer: MEDICARE

## 2019-06-25 ENCOUNTER — OFFICE VISIT (OUTPATIENT)
Dept: SURGERY | Facility: CLINIC | Age: 74
End: 2019-06-25
Payer: MEDICARE

## 2019-06-25 VITALS
HEART RATE: 75 BPM | BODY MASS INDEX: 22.81 KG/M2 | WEIGHT: 154 LBS | SYSTOLIC BLOOD PRESSURE: 110 MMHG | RESPIRATION RATE: 16 BRPM | DIASTOLIC BLOOD PRESSURE: 66 MMHG | TEMPERATURE: 98 F | HEIGHT: 69 IN

## 2019-06-25 VITALS
TEMPERATURE: 98 F | SYSTOLIC BLOOD PRESSURE: 141 MMHG | OXYGEN SATURATION: 100 % | HEART RATE: 67 BPM | RESPIRATION RATE: 16 BRPM | DIASTOLIC BLOOD PRESSURE: 70 MMHG

## 2019-06-25 DIAGNOSIS — R33.9 URINARY RETENTION: ICD-10-CM

## 2019-06-25 DIAGNOSIS — Z97.8 FOLEY CATHETER IN PLACE: ICD-10-CM

## 2019-06-25 DIAGNOSIS — R33.9 URINARY RETENTION: Primary | ICD-10-CM

## 2019-06-25 DIAGNOSIS — N40.1 BENIGN PROSTATIC HYPERPLASIA WITH LOWER URINARY TRACT SYMPTOMS, SYMPTOM DETAILS UNSPECIFIED: ICD-10-CM

## 2019-06-25 DIAGNOSIS — R42 DIZZINESS: Primary | ICD-10-CM

## 2019-06-25 PROCEDURE — 85025 COMPLETE CBC W/AUTO DIFF WBC: CPT | Performed by: EMERGENCY MEDICINE

## 2019-06-25 PROCEDURE — G0463 HOSPITAL OUTPT CLINIC VISIT: HCPCS | Performed by: NURSE PRACTITIONER

## 2019-06-25 PROCEDURE — 93005 ELECTROCARDIOGRAM TRACING: CPT

## 2019-06-25 PROCEDURE — 80048 BASIC METABOLIC PNL TOTAL CA: CPT | Performed by: EMERGENCY MEDICINE

## 2019-06-25 PROCEDURE — 99213 OFFICE O/P EST LOW 20 MIN: CPT | Performed by: NURSE PRACTITIONER

## 2019-06-25 PROCEDURE — 36415 COLL VENOUS BLD VENIPUNCTURE: CPT

## 2019-06-25 PROCEDURE — 51702 INSERT TEMP BLADDER CATH: CPT

## 2019-06-25 PROCEDURE — 93010 ELECTROCARDIOGRAM REPORT: CPT | Performed by: EMERGENCY MEDICINE

## 2019-06-25 PROCEDURE — 99284 EMERGENCY DEPT VISIT MOD MDM: CPT

## 2019-06-25 RX ORDER — LIDOCAINE HYDROCHLORIDE 20 MG/ML
10 JELLY TOPICAL ONCE
Status: COMPLETED | OUTPATIENT
Start: 2019-06-25 | End: 2019-06-25

## 2019-06-25 NOTE — ED INITIAL ASSESSMENT (HPI)
Coleman Scheuermann is here for eval of brief episode of dizziness at doctor's office. He had catheter removed. Tried to void and became dizziness. Denies associated symptoms. Denies dizziness now. No chest pain or FLETCHER.

## 2019-06-25 NOTE — ED PROVIDER NOTES
Patient Seen in: [facility] Emergency Department    History   Patient presents with:  Dizziness (neurologic)    Stated Complaint: dizziness    HPI    Patient presents with complaint of feeling lightheaded and dizzy.   He was at the urology office a 7 days. Pantoprazole Sodium 40 MG Oral Tab EC,  Take 1 tablet (40 mg total) by mouth every morning before breakfast.   magnesium oxide 400 MG Oral Tab,  Take 1 tablet (400 mg total) by mouth 2 (two) times daily with meals.    Thiamine HCl 100 MG Oral Tab, with good peripheral perfusion. Respiratory:  Lungs clear to auscultation bilaterally with good effort. No wheezes, ronchi, or rales. Abdomen:  Soft, non-tender, non-distended. No masses, no hepato-splenomegaly. Musculoskeletal:  Good muscle tone. GFR, Non- 43 (*)     GFR, -American 50 (*)     All other components within normal limits   CBC W/ DIFFERENTIAL - Abnormal; Notable for the following components:    RBC 2.77 (*)     HGB 8.8 (*)     HCT 27.5 (*)     All other component

## 2019-06-25 NOTE — ED NOTES
Pt's finley standard drainage bag switched to a leg bag. Pt instructed on how to empty the urine. Pt demonstrated understanding.

## 2019-06-25 NOTE — PROGRESS NOTES
HPI:    Patient ID: Kip Roman is a 68year old male. HPI   Patient is a 68year old male who presents to the clinic for a follow up regarding urinary retention.       INTERVAL HISTORY:   - Saw me on 6/6/2019 for a voiding trial, which he failed wi symptoms.     PMH: HTN, HLD and CAD.  He is status post CABG in 2007.     PSH: CABG, cataract surgery, tonsillectomy.      SOCIAL HX: Patient is . Former smoker of 25 pack years. He was an active drinker of 5-6 drinks daily.   He has quit since 05/ Take 1 tablet by mouth daily. Disp: 30 tablet Rfl: 0     Allergies:  Ramipril                ANGIOEDEMA    Comment:Swelling to upper lip.     HISTORY:  Past Medical History:   Diagnosis Date   • [facility] dependence ([facility]    • Carotid artery disease ([facility] Neurological: He is alert and oriented to person, place, and time. Skin: Skin is warm and dry. Psychiatric: He has a normal mood and affect.             ASSESSMENT/PLAN:   Urinary retention  (primary encounter diagnosis)  Chery catheter in place  [facility] Report was given to paramedics. I advised he have finley catheter replaced in the ER.       Patient will need to follow up with Dr. Popeye Templeton for further testing regarding his urinary retention. He was interested in CIC.   Will likely need CIC teaching with

## 2019-06-26 ENCOUNTER — TELEPHONE (OUTPATIENT)
Dept: SURGERY | Facility: CLINIC | Age: 74
End: 2019-06-26

## 2019-06-26 NOTE — TELEPHONE ENCOUNTER
Pt. States that he was just seen yesterday, and he wants to know if he needs to be seen before 7/9/19, and if he needs to get any labs done, and find out plan of treatment.

## 2019-06-27 NOTE — TELEPHONE ENCOUNTER
Per Marion's verbal order, schedule patient for nurse visit next week to remove finley, teach CIC, collect a urine specimen for urine culture before finley removed. Patient then to follow up the following week for CMG with Dr. Filippo Oliveira.      Contacted patient

## 2019-06-27 NOTE — TELEPHONE ENCOUNTER
Patient seen 2 days ago for voiding trial.  After the visit, patient c/o dizziness and sent to ER by our staff.      6/25 ER note states finley replaced. Qamar Rader, patient has f/u scheduled on 7/9 with you. Do you want any labs? Seen sooner?

## 2019-06-27 NOTE — TELEPHONE ENCOUNTER
Patient should perform CIC four times a day with a 16 fr coude catheter (due to enlarged prostate) indefinitely.

## 2019-06-27 NOTE — TELEPHONE ENCOUNTER
Noted. When patient comes for nurse visit for CIC teaching, please give him the CMG instruction sheet. Thanks.

## 2019-07-03 ENCOUNTER — NURSE ONLY (OUTPATIENT)
Dept: SURGERY | Facility: CLINIC | Age: 74
End: 2019-07-03
Payer: MEDICARE

## 2019-07-03 DIAGNOSIS — R39.89 SUSPECTED UTI: Primary | ICD-10-CM

## 2019-07-03 DIAGNOSIS — R33.9 URINARY RETENTION: ICD-10-CM

## 2019-07-03 PROCEDURE — 51700 IRRIGATION OF BLADDER: CPT | Performed by: UROLOGY

## 2019-07-03 NOTE — PROGRESS NOTES
I faxed the [employer] script for CIC caths, LOV note of RAH's and INS and demographics to them and received a fax confirmation.

## 2019-07-03 NOTE — PROGRESS NOTES
I called pt into the exam room and I introduced myself and verified his name and . I explained that I was instructed to collect a urine specimen from the finley cath and then fill the bladder remove his finley cath and the teach CIC.  Pt agreed to this kristal step by tep how to perform CIC. Pt was able to give return demonstration of CIC with minimal verbal cues and was successful at draining the bladder of the fluid I instilled. I then assisted pt to clean up and redress.  I then gave instructions to pt about h

## 2019-07-05 RX ORDER — ATORVASTATIN CALCIUM 80 MG/1
80 TABLET, FILM COATED ORAL
Qty: 90 TABLET | Refills: 1 | Status: SHIPPED | OUTPATIENT
Start: 2019-07-05 | End: 2019-12-30

## 2019-07-05 RX ORDER — METOPROLOL SUCCINATE 25 MG/1
25 TABLET, EXTENDED RELEASE ORAL
Qty: 90 TABLET | Refills: 1 | Status: SHIPPED | OUTPATIENT
Start: 2019-07-05 | End: 2019-12-30

## 2019-07-05 RX ORDER — FENOFIBRATE 160 MG/1
160 TABLET ORAL
Qty: 90 TABLET | Refills: 1 | Status: SHIPPED | OUTPATIENT
Start: 2019-07-05 | End: 2019-12-30

## 2019-07-05 NOTE — TELEPHONE ENCOUNTER
Verified medication dose, Meets protocol , refill order sent. Creatinine is elevated but decreased from previous results.    Cholesterol Medications  Protocol Criteria:  · Appointment scheduled with Cardiology in the past 12 months or in the next 3 months 3.1 - 4.9 mmol/L  · Creatinine is normal or increase is less than 30% from prior test  · Exclusion: Nitroglycerine sublingual prn is never allowed to be refilled by this protocol.     Recent Outpatient Visits            2 days ago Suspected UTI    Mauricio

## 2019-07-08 ENCOUNTER — TELEPHONE (OUTPATIENT)
Dept: SURGERY | Facility: CLINIC | Age: 74
End: 2019-07-08

## 2019-07-08 NOTE — TELEPHONE ENCOUNTER
Phoned pt back and spoke with him. He states [employer] will be contacting his home care nurse to supply his cic caths until his home care is  Completed. He states, however he only has enough catheters for one day.  He states he performs cic four times a d

## 2019-07-08 NOTE — TELEPHONE ENCOUNTER
Pt called stating pt was given samples of cath. Spoke to the company that is providing pt his supplies advised will need to get it approved by medicare and could take several days. Pt is running out tomorrow. Need more samples.   Can pt come to the offic

## 2019-07-10 ENCOUNTER — TELEPHONE (OUTPATIENT)
Dept: SURGERY | Facility: CLINIC | Age: 74
End: 2019-07-10

## 2019-07-10 ENCOUNTER — TELEPHONE (OUTPATIENT)
Dept: FAMILY MEDICINE CLINIC | Facility: CLINIC | Age: 74
End: 2019-07-10

## 2019-07-10 NOTE — TELEPHONE ENCOUNTER
[facility], states that pt is refusing services for [address] would like to know if the doctor can give her order to discharge the patient.

## 2019-07-10 NOTE — TELEPHONE ENCOUNTER
Per Dr. Stefano Child; patient should not have CMG today, call patient and cancel the CMG test; inform patient that our office will call him this afternoon with next step/plan. Patient contacted.  Patient is aware that our office will cancel the CMG

## 2019-07-13 ENCOUNTER — PATIENT MESSAGE (OUTPATIENT)
Dept: SURGERY | Facility: CLINIC | Age: 74
End: 2019-07-13

## 2019-07-15 ENCOUNTER — OFFICE VISIT (OUTPATIENT)
Dept: FAMILY MEDICINE CLINIC | Facility: CLINIC | Age: 74
End: 2019-07-15
Payer: MEDICARE

## 2019-07-15 ENCOUNTER — PATIENT MESSAGE (OUTPATIENT)
Dept: CARDIOLOGY CLINIC | Facility: CLINIC | Age: 74
End: 2019-07-15

## 2019-07-15 VITALS
HEART RATE: 80 BPM | BODY MASS INDEX: 22.67 KG/M2 | HEIGHT: 70 IN | SYSTOLIC BLOOD PRESSURE: 139 MMHG | WEIGHT: 158.38 LBS | DIASTOLIC BLOOD PRESSURE: 75 MMHG | RESPIRATION RATE: 14 BRPM | TEMPERATURE: 98 F

## 2019-07-15 DIAGNOSIS — D64.9 ANEMIA, UNSPECIFIED TYPE: ICD-10-CM

## 2019-07-15 DIAGNOSIS — R33.9 URINARY RETENTION: Primary | ICD-10-CM

## 2019-07-15 DIAGNOSIS — R26.81 GAIT INSTABILITY: ICD-10-CM

## 2019-07-15 DIAGNOSIS — F10.231: ICD-10-CM

## 2019-07-15 DIAGNOSIS — N40.1 BENIGN PROSTATIC HYPERPLASIA WITH LOWER URINARY TRACT SYMPTOMS, SYMPTOM DETAILS UNSPECIFIED: ICD-10-CM

## 2019-07-15 PROCEDURE — G0463 HOSPITAL OUTPT CLINIC VISIT: HCPCS | Performed by: FAMILY MEDICINE

## 2019-07-15 PROCEDURE — 1111F DSCHRG MED/CURRENT MED MERGE: CPT | Performed by: FAMILY MEDICINE

## 2019-07-15 PROCEDURE — 99214 OFFICE O/P EST MOD 30 MIN: CPT | Performed by: FAMILY MEDICINE

## 2019-07-15 NOTE — TELEPHONE ENCOUNTER
From: Marina Borden  To:  Pincus Osler, MD  Sent: 7/15/2019 6:58 AM CDT  Subject: Prescription Question    I am getting ready to head to [facility] for my fall volunteering and realized that I will run out of my medications 2 weeks before get back mid-Oct.

## 2019-07-15 NOTE — PROGRESS NOTES
Patient ID: Shannon Barnes is a 68year old male. HPI  Patient presents with:  Hospital F/U: dizziness    HPI from ED visit on 6/25/19:  Patient presents with complaint of feeling lightheaded and dizzy.   He was at the urology office as he has had an HCT 27.5 (L) 06/25/2019    .0 06/25/2019    MPV 8.3 03/07/2018    MCV 99.3 06/25/2019    MCH 31.8 06/25/2019    MCHC 32.0 06/25/2019    RDW 12.4 06/25/2019    NEPRELIM 5.61 06/25/2019    NEPERCENT 74.9 06/25/2019    LYPERCENT 16.2 06/25/2019    MOPE Moderate (A) 06/19/2019    MICCOM Completed 01/20/2011     No results found for: EAG, A1C    No results found for: Valla Boots, CREAURINE, MIALBURINE, MCRRATIOUR, MALBCRECALC, MICROALBUMIN, CREAUR, MALBCREACALC    Lab Results   Component Value Date    CHO Genitourinary: Positive for difficulty urinating (inability to urinate without a catheter). Skin: Negative for color change. Neurological: Negative for dizziness, speech difficulty and weakness.    Psychiatric/Behavioral: The patient is not nervous/an Allergies:  Ramipril                ANGIOEDEMA    Comment:Swelling to upper lip. PHYSICAL EXAM:   Physical Exam  Blood pressure 139/75, pulse 80, temperature 98.2 °F (36.8 °C), temperature source Oral, resp.  rate 14, height 5' 10\" (1.778 m), weight neurologist.  Delirium, withdrawal, alcoholic ([facility]  -     NEURO - INTERNAL  No longer having any alcoholic withdrawal as has abstained from alcohol and I told him he should continue to abstain and not even have one drink.       Referrals (if applicable)  O

## 2019-07-16 ENCOUNTER — LAB ENCOUNTER (OUTPATIENT)
Dept: LAB | Age: 74
End: 2019-07-16
Attending: FAMILY MEDICINE
Payer: MEDICARE

## 2019-07-16 DIAGNOSIS — D64.9 ANEMIA, UNSPECIFIED TYPE: ICD-10-CM

## 2019-07-16 DIAGNOSIS — R33.9 URINARY RETENTION: ICD-10-CM

## 2019-07-16 LAB
ANION GAP SERPL CALC-SCNC: 8 MMOL/L (ref 0–18)
BASOPHILS # BLD AUTO: 0.05 X10(3) UL (ref 0–0.2)
BASOPHILS NFR BLD AUTO: 0.7 %
BUN BLD-MCNC: 21 MG/DL (ref 7–18)
BUN/CREAT SERPL: 14 (ref 10–20)
CALCIUM BLD-MCNC: 9.1 MG/DL (ref 8.5–10.1)
CHLORIDE SERPL-SCNC: 105 MMOL/L (ref 98–112)
CO2 SERPL-SCNC: 26 MMOL/L (ref 21–32)
CREAT BLD-MCNC: 1.5 MG/DL (ref 0.7–1.3)
DEPRECATED RDW RBC AUTO: 43.5 FL (ref 35.1–46.3)
EOSINOPHIL # BLD AUTO: 0.18 X10(3) UL (ref 0–0.7)
EOSINOPHIL NFR BLD AUTO: 2.7 %
ERYTHROCYTE [DISTWIDTH] IN BLOOD BY AUTOMATED COUNT: 12.4 % (ref 11–15)
GLUCOSE BLD-MCNC: 96 MG/DL (ref 70–99)
HCT VFR BLD AUTO: 27.6 % (ref 39–53)
HGB BLD-MCNC: 9 G/DL (ref 13–17.5)
IMM GRANULOCYTES # BLD AUTO: 0.03 X10(3) UL (ref 0–1)
IMM GRANULOCYTES NFR BLD: 0.4 %
LYMPHOCYTES # BLD AUTO: 1.74 X10(3) UL (ref 1–4)
LYMPHOCYTES NFR BLD AUTO: 25.8 %
MCH RBC QN AUTO: 31.4 PG (ref 26–34)
MCHC RBC AUTO-ENTMCNC: 32.6 G/DL (ref 31–37)
MCV RBC AUTO: 96.2 FL (ref 80–100)
MONOCYTES # BLD AUTO: 0.7 X10(3) UL (ref 0.1–1)
MONOCYTES NFR BLD AUTO: 10.4 %
NEUTROPHILS # BLD AUTO: 4.04 X10 (3) UL (ref 1.5–7.7)
NEUTROPHILS # BLD AUTO: 4.04 X10(3) UL (ref 1.5–7.7)
NEUTROPHILS NFR BLD AUTO: 60 %
OSMOLALITY SERPL CALC.SUM OF ELEC: 291 MOSM/KG (ref 275–295)
PATIENT FASTING: YES
PLATELET # BLD AUTO: 347 10(3)UL (ref 150–450)
POTASSIUM SERPL-SCNC: 4.1 MMOL/L (ref 3.5–5.1)
RBC # BLD AUTO: 2.87 X10(6)UL (ref 3.8–5.8)
SODIUM SERPL-SCNC: 139 MMOL/L (ref 136–145)
WBC # BLD AUTO: 6.7 X10(3) UL (ref 4–11)

## 2019-07-16 PROCEDURE — 36415 COLL VENOUS BLD VENIPUNCTURE: CPT

## 2019-07-16 PROCEDURE — 80048 BASIC METABOLIC PNL TOTAL CA: CPT

## 2019-07-16 PROCEDURE — 85025 COMPLETE CBC W/AUTO DIFF WBC: CPT

## 2019-07-16 NOTE — TELEPHONE ENCOUNTER
From: Criss Smith  To: Celso Dumont MD  Sent: 7/13/2019 10:05 AM CDT  Subject: Test Results Question    When your nurse called me Wed to cancel my CMG, she said she would call back that afternoon to update me on my most recent urine culture.  I ha

## 2019-07-17 ENCOUNTER — TELEPHONE (OUTPATIENT)
Dept: SURGERY | Facility: CLINIC | Age: 74
End: 2019-07-17

## 2019-07-17 DIAGNOSIS — B96.5 PSEUDOMONAS URINARY TRACT INFECTION: Primary | ICD-10-CM

## 2019-07-17 DIAGNOSIS — N39.0 PSEUDOMONAS URINARY TRACT INFECTION: Primary | ICD-10-CM

## 2019-07-17 RX ORDER — CIPROFLOXACIN 500 MG/1
500 TABLET, FILM COATED ORAL 2 TIMES DAILY
Qty: 14 TABLET | Refills: 0 | Status: SHIPPED | OUTPATIENT
Start: 2019-07-17 | End: 2019-07-24

## 2019-07-17 NOTE — TELEPHONE ENCOUNTER
Paradise Cárdenas just responded to a \"my chart\" message pt sent on 7/15/19 regarding cic supplies, however upon review of chart, noted he sent a \"my chart\"  7/13 regarding urine C&S results, that a nurse from the float pool started action on , but it is u

## 2019-07-18 ENCOUNTER — TELEPHONE (OUTPATIENT)
Dept: SURGERY | Facility: CLINIC | Age: 74
End: 2019-07-18

## 2019-07-18 NOTE — TELEPHONE ENCOUNTER
MA: Phone [phone] at [phone], and let them know he was discharged from home health care on July 10, 2019 and is due for his supplies. Our registered nurse was on the phone with you yesterday and we have spent too much time dealing with this.   Ple

## 2019-07-18 NOTE — TELEPHONE ENCOUNTER
Spoke with patient regarding MD's instructions. PT verbalized understanding. Helped schedule for appt for nurse visit for CMG and cystoscopy.      Future Appointments   Date Time Provider Jenna Conway   8/7/2019  2:00 PM [address] [place] [address]

## 2019-07-18 NOTE — TELEPHONE ENCOUNTER
Urology staff: Please call patient and inform him of urine culture results. I believe that this is colonization.  However, given that we are planning to perform a CMG study in the office I would like him to be on antibiotics around the time of his test.

## 2019-07-18 NOTE — TELEPHONE ENCOUNTER
Pt is at the  stated that  [address] him to come in fro  4 days woth of Cath Supplies.

## 2019-07-19 NOTE — TELEPHONE ENCOUNTER
Spoke with patient informed medical supply will be mailed and should receive supply tomorrow/ per [employer]

## 2019-07-25 ENCOUNTER — TELEPHONE (OUTPATIENT)
Dept: SURGERY | Facility: CLINIC | Age: 74
End: 2019-07-25

## 2019-07-25 NOTE — TELEPHONE ENCOUNTER
Adams the [employer] rep came into the uro office to ask that a few different pt's need their o/v notes to be addendumed to include the reason for the coude cath and need to add that he will be performing CIC indefinitely. I found a note with this info that Kt Brush had included and I faxed it to [address] [place] [address] and received a fax confirmation.

## 2019-08-07 ENCOUNTER — OFFICE VISIT (OUTPATIENT)
Dept: SURGERY | Facility: CLINIC | Age: 74
End: 2019-08-07
Payer: MEDICARE

## 2019-08-07 VITALS
BODY MASS INDEX: 23 KG/M2 | HEART RATE: 66 BPM | WEIGHT: 158 LBS | SYSTOLIC BLOOD PRESSURE: 171 MMHG | DIASTOLIC BLOOD PRESSURE: 83 MMHG

## 2019-08-07 DIAGNOSIS — N40.1 BENIGN PROSTATIC HYPERPLASIA WITH WEAK URINARY STREAM: ICD-10-CM

## 2019-08-07 DIAGNOSIS — R39.12 BENIGN PROSTATIC HYPERPLASIA WITH WEAK URINARY STREAM: ICD-10-CM

## 2019-08-07 DIAGNOSIS — N31.2 ATONIC URINARY BLADDER: ICD-10-CM

## 2019-08-07 DIAGNOSIS — R33.9 URINARY RETENTION: Primary | ICD-10-CM

## 2019-08-07 PROCEDURE — 51797 INTRAABDOMINAL PRESSURE TEST: CPT | Performed by: UROLOGY

## 2019-08-07 PROCEDURE — 51729 CYSTOMETROGRAM W/VP&UP: CPT | Performed by: UROLOGY

## 2019-08-07 PROCEDURE — 52000 CYSTOURETHROSCOPY: CPT | Performed by: UROLOGY

## 2019-08-07 PROCEDURE — G0463 HOSPITAL OUTPT CLINIC VISIT: HCPCS | Performed by: UROLOGY

## 2019-08-07 PROCEDURE — 99213 OFFICE O/P EST LOW 20 MIN: CPT | Performed by: UROLOGY

## 2019-08-07 RX ORDER — FINASTERIDE 5 MG/1
5 TABLET, FILM COATED ORAL DAILY
Qty: 90 TABLET | Refills: 3 | Status: SHIPPED | OUTPATIENT
Start: 2019-08-07 | End: 2020-08-06

## 2019-08-07 NOTE — PROGRESS NOTES
[facility], [facility]  Follow-Up Visit    HPI: Elia Kinsey is a 69year old male presents for a follow up visit. Patient was last seen by Pauline Duron on 6/25/2019. INTERVAL HISTORY: Here for scheduled urodynamic studies.   Performing CIC 3- cystoscopy revealed a distended, decompensated bladder with trabeculations and cellules consistent with chronic bladder outlet obstruction. Prostate moderately enlarged with a mild intravesical median lobe component.     Patient offered continued CIC versu urinary retention of 5 liters in May 2019. Failed TOV x2 in 06/2019. On maximal medical therapy with Proscar and alfuzosin. Patient learned and is performing CIC 3-4 times daily using a 16 Yi coudé red rubber catheter.     Complex urodynamic studies

## 2019-08-07 NOTE — PROCEDURES
Urology Office Procedure Note    PREOP DIAGNOSIS: Chronic bladder outlet obstruction with urinary retention. POSTOP DIAGNOSIS: Chronic bladder outlet obstruction with urinary retention secondary to hypotonic neurogenic bladder.     PROCEDURE PERFORMED: C recorded. Sensation markers were set at appropriate times. See strip. The patient was then asked to bear down and cough to check for leakage. The test was then continued until fullness was reached. Leak point pressure study performed.   The patient was t

## 2019-08-07 NOTE — PROGRESS NOTES
Cmg results were provided to Dr. Peggy De Anda. Pt had performed cic 1/2 hr  prior to cmg . I asked pt to attempt to urinate  prior to initiating the test, however he was unable to do so. Cathed result s/p pt's attempt to void was 50cc.   Pt also was unable to void up

## 2019-08-23 NOTE — PROGRESS NOTES
Neurology Initial Visit     Referred By: Dr. Brooks ref. provider found    Chief Complaint: Patient presents with:  Hospital F/U: Patient here for evaluation for gait imbalance which he states is resolved.  Also, patient had prior episodes of confusion but al Packs/day For 25.00 Years   Types: Cigarettes   Smokeless tobacco: Never Used   Comment: Quit 15-20 years ago       Alcohol use Not Currently   Comment: not drinking now since 4/2019       Drug use: No       Family History   Problem Relation Age of Onset Facial sensation intact  VII. Face symmetric, no facial weakness  VIII. Hearing intact to whisper, tuning fork or finger rub. IX. Pallet elevates symmetrically. XI. Shoulder shrug is intact  XII.  Tongue is midline    Motor Exam:  Muscle tone normal  No a not be able to drive           Education and counseling provided to patient. Instructed patient to call my office or seek medical attention immediately if symptoms worsen. Patient verbalized understanding of information given. All questions were answered.

## 2019-08-30 PROBLEM — F10.11 ALCOHOL ABUSE, IN REMISSION: Status: ACTIVE | Noted: 2019-08-30

## 2019-08-30 PROBLEM — Z95.1 HISTORY OF CORONARY ARTERY BYPASS GRAFT X 2: Status: ACTIVE | Noted: 2019-08-30

## 2019-08-30 NOTE — PROGRESS NOTES
Patient ID: Carlene Sanchez is a 69year old male. HPI  Patient presents with: Follow - Up    Pt is feeling well overall.  States he feels almost completely back to normal. Thinks he will have to self-catheterize for the rest of his life, but it is no • Carotid artery disease (HCC)     per NG   • Cataract     cataract surgery    • Coronary artery disease     CABG; per NG   • Essential hypertension    • Gastrointestinal complaints, nonspecific     Bowel problems; per NG   • Hearing impairment    • High temperature source Oral, height 5' 10\" (1.778 m), weight 161 lb (73 kg).    08/30/19  0826 08/30/19  0858   BP: 137/77 118/68   Pulse: 59    Temp: 97.8 °F (36.6 °C)    TempSrc: Oral    Weight: 161 lb (73 kg)    Height: 5' 10\" (1.778 m)           ASSESSMEN Venkatesh Fry,  attest that this documentation has been prepared under the direction and in the presence of Kerline Jones DO.    Electronically Signed: Olivia Harris, 8/30/2019, 8:26 AM.      I, Kerline Jones DO,  personally performed the services described

## 2019-09-04 ENCOUNTER — HOSPITAL ENCOUNTER (OUTPATIENT)
Dept: CV DIAGNOSTICS | Facility: HOSPITAL | Age: 74
Discharge: HOME | End: 2019-09-04
Attending: INTERNAL MEDICINE
Payer: MEDICARE

## 2019-09-04 DIAGNOSIS — I25.10 ATHEROSCLEROSIS OF NATIVE CORONARY ARTERY OF NATIVE HEART WITHOUT ANGINA PECTORIS: ICD-10-CM

## 2019-09-04 DIAGNOSIS — E78.00 HYPERCHOLESTEREMIA: ICD-10-CM

## 2019-09-04 PROCEDURE — 93350 STRESS TTE ONLY: CPT | Performed by: INTERNAL MEDICINE

## 2019-09-04 PROCEDURE — 93017 CV STRESS TEST TRACING ONLY: CPT | Performed by: INTERNAL MEDICINE

## 2019-09-04 PROCEDURE — 93016 CV STRESS TEST SUPVJ ONLY: CPT | Performed by: INTERNAL MEDICINE

## 2019-09-04 PROCEDURE — 93018 CV STRESS TEST I&R ONLY: CPT | Performed by: INTERNAL MEDICINE

## 2019-09-10 ENCOUNTER — TELEPHONE (OUTPATIENT)
Dept: CARDIOLOGY CLINIC | Facility: CLINIC | Age: 74
End: 2019-09-10

## 2019-10-14 ENCOUNTER — TELEPHONE (OUTPATIENT)
Dept: FAMILY MEDICINE CLINIC | Facility: CLINIC | Age: 74
End: 2019-10-14

## 2019-10-14 DIAGNOSIS — Z12.11 SCREENING FOR COLON CANCER: Primary | ICD-10-CM

## 2019-10-16 ENCOUNTER — TELEPHONE (OUTPATIENT)
Dept: FAMILY MEDICINE CLINIC | Facility: CLINIC | Age: 74
End: 2019-10-16

## 2019-10-16 DIAGNOSIS — H91.93 DECREASED HEARING OF BOTH EARS: Primary | ICD-10-CM

## 2019-10-16 NOTE — TELEPHONE ENCOUNTER
Per patient he needs a referral for Audiology for hearing test; f/u and possible hearing aid, patient has an appointment 10/23/2019.

## 2019-10-16 NOTE — TELEPHONE ENCOUNTER
Dr. Jerona Eisenmenger, patient has an appointment 10/23/2019 with Warden [facility]ANDHAWA for hearing loss.  Please advise on referral.

## 2019-10-23 ENCOUNTER — OFFICE VISIT (OUTPATIENT)
Dept: AUDIOLOGY | Facility: CLINIC | Age: 74
End: 2019-10-23
Payer: MEDICARE

## 2019-10-23 DIAGNOSIS — H90.3 SENSORINEURAL HEARING LOSS, BILATERAL: Primary | ICD-10-CM

## 2019-10-23 PROCEDURE — 92567 TYMPANOMETRY: CPT | Performed by: AUDIOLOGIST

## 2019-10-23 PROCEDURE — 92557 COMPREHENSIVE HEARING TEST: CPT | Performed by: AUDIOLOGIST

## 2019-10-23 PROCEDURE — 92591 HEARING AID EXAM, BOTH EARS: CPT | Performed by: AUDIOLOGIST

## 2019-10-23 NOTE — PROGRESS NOTES
AUDIOGRAM     Ashlie Hanson was referred for testing by Winter Porter. 7/31/1945  [id number]        History    Mr. Aretha Oro is here today for an audiological evaluation.   He has tried/purchased hearing aids in the past, but did not perceive benefit and Hearing Aids  Hearing aid evaluation  Hearing aid(s)  Custom earmolds  Payment in full at delivery  Batteries  Office visits after service agreement ends    Medical waiver was signed.       Patient's Choice  Level of technology: Premium  Fitting: binaural

## 2019-10-24 ENCOUNTER — OFFICE VISIT (OUTPATIENT)
Dept: AUDIOLOGY | Facility: CLINIC | Age: 74
End: 2019-10-24
Payer: MEDICARE

## 2019-10-24 ENCOUNTER — OFFICE VISIT (OUTPATIENT)
Dept: OTOLARYNGOLOGY | Facility: CLINIC | Age: 74
End: 2019-10-24
Payer: MEDICARE

## 2019-10-24 VITALS
DIASTOLIC BLOOD PRESSURE: 76 MMHG | SYSTOLIC BLOOD PRESSURE: 120 MMHG | HEIGHT: 70 IN | BODY MASS INDEX: 23.62 KG/M2 | WEIGHT: 165 LBS | TEMPERATURE: 98 F

## 2019-10-24 DIAGNOSIS — H61.23 BILATERAL IMPACTED CERUMEN: Primary | ICD-10-CM

## 2019-10-24 DIAGNOSIS — H90.3 SENSORINEURAL HEARING LOSS, BILATERAL: Primary | ICD-10-CM

## 2019-10-24 PROCEDURE — 92593 HEARING AID CHECK, BOTH EARS: CPT | Performed by: AUDIOLOGIST

## 2019-10-24 PROCEDURE — 69210 REMOVE IMPACTED EAR WAX UNI: CPT | Performed by: OTOLARYNGOLOGY

## 2019-10-24 NOTE — PROGRESS NOTES
HEARING AID FOLLOW-UP    Shaila Alvarado  7/31/1945  [id number]    Patient is here for cerumen removal by Dr. Ai Smith were taken without incident following and sent to [facility]  Patient has appointment for hearing aid fitting on 11/11/

## 2019-10-24 NOTE — PROGRESS NOTES
Xochitl Schultz is a 69year old male. Patient presents with:  Ear Problem: Bilateral ear wax removal     HPI:   His ears are blociked with wax again  finasteride 5 MG Oral Tab, Take 1 tablet (5 mg total) by mouth daily. , Disp: 90 tablet, Rfl: 3  Metoprol adult)   Temp 97.6 °F (36.4 °C) (Tympanic)   Ht 5' 10\" (1.778 m)   Wt 165 lb (74.8 kg)   BMI 23.68 kg/m²   System Findings Details   Skin Normal Inspection - Normal.   Constitutional Normal Overall appearance - Normal.   Head/Face Normal Facial features -

## 2019-11-11 ENCOUNTER — OFFICE VISIT (OUTPATIENT)
Dept: AUDIOLOGY | Facility: CLINIC | Age: 74
End: 2019-11-11

## 2019-11-11 DIAGNOSIS — H90.3 SENSORINEURAL HEARING LOSS, BILATERAL: Primary | ICD-10-CM

## 2019-11-11 PROCEDURE — V5261 HEARING AID, DIGIT, BIN, BTE: HCPCS | Performed by: AUDIOLOGIST

## 2019-11-11 NOTE — PROGRESS NOTES
Hearing Aid [address] [facility] purchased two hearing aids. Patient has tried CIC hearing aids here in the past. He returned them within his 45 day trial period at that time    The hearing aid fitting was completed by Garfield Benavidez

## 2019-11-26 ENCOUNTER — OFFICE VISIT (OUTPATIENT)
Dept: AUDIOLOGY | Facility: CLINIC | Age: 74
End: 2019-11-26
Payer: MEDICARE

## 2019-11-26 DIAGNOSIS — H90.3 SENSORINEURAL HEARING LOSS, BILATERAL: Primary | ICD-10-CM

## 2019-11-26 PROCEDURE — 92593 HEARING AID CHECK, BOTH EARS: CPT | Performed by: AUDIOLOGIST

## 2019-11-26 NOTE — PROGRESS NOTES
HEARING AID FOLLOW-UP    Neo Hussein  7/31/1945  [id number]    Otoscopic Inspection:  both ears: mild cerumen and TM visualized      Hearing Aid Information       Right    Left   Make: Oticon Make: Oticon   Model: OPN S 1 MINI RITE Model: OPN S 1 MINI

## 2019-12-30 ENCOUNTER — OFFICE VISIT (OUTPATIENT)
Dept: FAMILY MEDICINE CLINIC | Facility: CLINIC | Age: 74
End: 2019-12-30
Payer: MEDICARE

## 2019-12-30 VITALS
HEIGHT: 70 IN | DIASTOLIC BLOOD PRESSURE: 71 MMHG | BODY MASS INDEX: 24.62 KG/M2 | SYSTOLIC BLOOD PRESSURE: 127 MMHG | WEIGHT: 172 LBS | HEART RATE: 79 BPM

## 2019-12-30 DIAGNOSIS — I25.10 ATHEROSCLEROSIS OF NATIVE CORONARY ARTERY OF NATIVE HEART WITHOUT ANGINA PECTORIS: ICD-10-CM

## 2019-12-30 DIAGNOSIS — F41.9 ANXIETY: ICD-10-CM

## 2019-12-30 DIAGNOSIS — IMO0001 ALCOHOLISM /ALCOHOL ABUSE: Primary | ICD-10-CM

## 2019-12-30 DIAGNOSIS — F39 EPISODIC MOOD DISORDER (HCC): ICD-10-CM

## 2019-12-30 DIAGNOSIS — Z63.0 MARITAL DYSFUNCTION: ICD-10-CM

## 2019-12-30 DIAGNOSIS — F10.20 ALCOHOL USE DISORDER, MODERATE, DEPENDENCE (HCC): ICD-10-CM

## 2019-12-30 DIAGNOSIS — Z95.1 HISTORY OF CORONARY ARTERY BYPASS GRAFT X 2: ICD-10-CM

## 2019-12-30 DIAGNOSIS — E78.2 MIXED HYPERLIPIDEMIA: ICD-10-CM

## 2019-12-30 PROCEDURE — 99214 OFFICE O/P EST MOD 30 MIN: CPT | Performed by: FAMILY MEDICINE

## 2019-12-30 PROCEDURE — G0463 HOSPITAL OUTPT CLINIC VISIT: HCPCS | Performed by: FAMILY MEDICINE

## 2019-12-30 RX ORDER — FENOFIBRATE 160 MG/1
160 TABLET ORAL
Qty: 90 TABLET | Refills: 1 | Status: SHIPPED | OUTPATIENT
Start: 2019-12-30 | End: 2020-10-27

## 2019-12-30 RX ORDER — METOPROLOL SUCCINATE 25 MG/1
25 TABLET, EXTENDED RELEASE ORAL
Qty: 90 TABLET | Refills: 1 | Status: SHIPPED | OUTPATIENT
Start: 2019-12-30 | End: 2020-10-27

## 2019-12-30 RX ORDER — ATORVASTATIN CALCIUM 80 MG/1
80 TABLET, FILM COATED ORAL
Qty: 90 TABLET | Refills: 1 | Status: SHIPPED | OUTPATIENT
Start: 2019-12-30 | End: 2020-10-27

## 2019-12-30 SDOH — SOCIAL STABILITY - SOCIAL INSECURITY: PROBLEMS IN RELATIONSHIP WITH SPOUSE OR PARTNER: Z63.0

## 2019-12-30 NOTE — PROGRESS NOTES
Patient ID: Shaila Alvarado is a 69year old male. HPI  Patient presents with: Other: Alcohol abuse, relapse  Other: stressed during passed 3 weeks     Pt has been very stressed during the past 3 weeks.  He relapsed into drinking alcohol 3 weeks ago a kg/m²  08/30/19 : 23.10 kg/m²  08/23/19 : 22.24 kg/m²  08/07/19 : 22.67 kg/m²  07/15/19 : 22.73 kg/m²      BP Readings from Last 6 Encounters:  12/30/19 : 127/71  10/24/19 : 120/76  08/30/19 : 118/68  08/23/19 : 126/70  08/07/19 : (!) 171/83  07/15/19 : 13 Comment:Swelling to upper lip. PHYSICAL EXAM:   Physical Exam   Physical Exam   Constitutional: Patient is oriented to person, place, and time. Patient appears well-developed and well-nourished. No distress. Head: Normocephalic.     Eyes: Conjunctivae a disorder ([facility]  -     Sertraline HCl 50 MG Oral Tab; Take 1 tablet (50 mg total) by mouth daily. (For mood)  -     OP REFERRAL TO PSYCHIATRY    Anxiety  -     Sertraline HCl 50 MG Oral Tab; Take 1 tablet (50 mg total) by mouth daily.  (For mood)  -     OP R and complete.   Vipul Pike DO, 12/30/2019, 7:52 PM

## 2020-01-27 ENCOUNTER — PATIENT MESSAGE (OUTPATIENT)
Dept: FAMILY MEDICINE CLINIC | Facility: CLINIC | Age: 75
End: 2020-01-27

## 2020-02-11 ENCOUNTER — OFFICE VISIT (OUTPATIENT)
Dept: FAMILY MEDICINE CLINIC | Facility: CLINIC | Age: 75
End: 2020-02-11
Payer: MEDICARE

## 2020-02-11 VITALS
BODY MASS INDEX: 25.17 KG/M2 | SYSTOLIC BLOOD PRESSURE: 110 MMHG | HEIGHT: 70 IN | DIASTOLIC BLOOD PRESSURE: 70 MMHG | TEMPERATURE: 99 F | HEART RATE: 67 BPM | WEIGHT: 175.81 LBS

## 2020-02-11 DIAGNOSIS — F10.20 ALCOHOL DEPENDENCE, DAILY USE (HCC): ICD-10-CM

## 2020-02-11 DIAGNOSIS — IMO0001 ALCOHOLISM /ALCOHOL ABUSE: Primary | ICD-10-CM

## 2020-02-11 DIAGNOSIS — F41.9 ANXIETY: ICD-10-CM

## 2020-02-11 DIAGNOSIS — F39 EPISODIC MOOD DISORDER (HCC): ICD-10-CM

## 2020-02-11 PROCEDURE — 99214 OFFICE O/P EST MOD 30 MIN: CPT | Performed by: FAMILY MEDICINE

## 2020-02-11 PROCEDURE — G0463 HOSPITAL OUTPT CLINIC VISIT: HCPCS | Performed by: FAMILY MEDICINE

## 2020-02-11 NOTE — PROGRESS NOTES
Patient ID: Nolberto Page is a 69year old male. HPI  Patient presents with:  Alcohol Problem: follow up    Last seen by me on 12/30/19. We started him on sertraline 50 mg. Pt is unable to get an appointment with Dr. Jazmyn Wellington until June 2020.  H tightness and shortness of breath. Cardiovascular: Negative for chest pain. Gastrointestinal: Negative for abdominal pain. Skin: Negative for color change. Neurological: Negative for speech difficulty and weakness.    Psychiatric/Behavioral: The pa Pulmonary/Chest: Effort normal and breath sounds normal. No respiratory distress. Abdominal: Normal appearance and bowel sounds are normal. There is no tenderness. No hepatosplenomegaly. There is no rigidity, no rebound, no guarding.    Lower legs: No e Comments:              Royce Tarango [facility]              There are multiple psychiatrists and psychologists the office.           Referral Priority:Routine          Referral Type:OFFICE VISIT          Referred to Lupillo Sawyer

## 2020-02-13 ENCOUNTER — TELEPHONE (OUTPATIENT)
Dept: CARDIOLOGY CLINIC | Facility: CLINIC | Age: 75
End: 2020-02-13

## 2020-02-13 DIAGNOSIS — E78.00 HYPERCHOLESTEREMIA: Primary | ICD-10-CM

## 2020-02-13 DIAGNOSIS — I10 ESSENTIAL HYPERTENSION: ICD-10-CM

## 2020-02-13 NOTE — TELEPHONE ENCOUNTER
Patient has follow up with Dr. Nat Wise in April, he is asking to get lab work done prior to. Meds filled by Dr. Baldomero Bush, will ask if he wants lab work by patient in April? Last BMP/CBC 7/16/19 (then his office can order).

## 2020-03-31 ENCOUNTER — TELEPHONE (OUTPATIENT)
Dept: CARDIOLOGY CLINIC | Facility: CLINIC | Age: 75
End: 2020-03-31

## 2020-03-31 NOTE — TELEPHONE ENCOUNTER
Spoke with  Donovan Carlos, He states he is feeling fine and this was an annual follow up. He was happy to reschedule for 5/5.

## 2020-05-14 ENCOUNTER — TELEPHONE (OUTPATIENT)
Dept: SURGERY | Facility: CLINIC | Age: 75
End: 2020-05-14

## 2020-05-14 NOTE — TELEPHONE ENCOUNTER
Received fax from [employer] for detail written order for catheter supplies. Order placed on SUSANA Almeida's desk. No future follow up appointment scheduled with urology. Patients LOV 8/7/20  PLAN:  1.  Continue CIC 4 times daily for BPH with urinary r

## 2020-05-21 NOTE — TELEPHONE ENCOUNTER
Order signed by SUSANA Garcia. Order faxed to [address] [place] [address]. Confirmation received. Copy sent to scanning.

## 2020-07-10 ENCOUNTER — TELEPHONE (OUTPATIENT)
Dept: CARDIOLOGY CLINIC | Facility: CLINIC | Age: 75
End: 2020-07-10

## 2020-07-10 NOTE — TELEPHONE ENCOUNTER
Called and s/w wife venessa, verified HIPAA , and relayed no longer doing tele visit , confirmed appt for 8/4. At 4:15. No further questions.

## 2020-08-04 ENCOUNTER — OFFICE VISIT (OUTPATIENT)
Dept: CARDIOLOGY CLINIC | Facility: CLINIC | Age: 75
End: 2020-08-04
Payer: MEDICARE

## 2020-08-04 VITALS
WEIGHT: 168 LBS | HEIGHT: 70 IN | DIASTOLIC BLOOD PRESSURE: 78 MMHG | BODY MASS INDEX: 24.05 KG/M2 | SYSTOLIC BLOOD PRESSURE: 115 MMHG | RESPIRATION RATE: 18 BRPM | HEART RATE: 86 BPM

## 2020-08-04 DIAGNOSIS — I25.10 ATHEROSCLEROSIS OF NATIVE CORONARY ARTERY OF NATIVE HEART WITHOUT ANGINA PECTORIS: ICD-10-CM

## 2020-08-04 DIAGNOSIS — I10 ESSENTIAL HYPERTENSION: ICD-10-CM

## 2020-08-04 DIAGNOSIS — E78.00 HYPERCHOLESTEREMIA: Primary | ICD-10-CM

## 2020-08-04 PROCEDURE — 99214 OFFICE O/P EST MOD 30 MIN: CPT | Performed by: INTERNAL MEDICINE

## 2020-08-04 PROCEDURE — G0463 HOSPITAL OUTPT CLINIC VISIT: HCPCS | Performed by: INTERNAL MEDICINE

## 2020-08-04 NOTE — PATIENT INSTRUCTIONS
Schedule stress echocardiogram to be done in the spring    Get fasting cholesterol blood test soon    Continue regular medicines and call if changes

## 2020-08-04 NOTE — PROGRESS NOTES
[facility] CLINIC  PROGRESS NOTE    Alex Vincent is a 77year old male. Patient presents with:   Follow - Up  CAD  Hyperlipidemia  Hypertension    HPI:   This is a pleasant 77year old male with coronary disease status post bypass in 2008 with hypertension Hx of tonsillitis 1950    per NG   • Other and unspecified hyperlipidemia     per NG      Social History:  Social History    Tobacco Use      Smoking status: Former Smoker        Packs/day: 1.00        Years: 25.00        Pack years: 25        Types: Cigar Atherosclerosis of native coronary artery of native heart without angina pectoris    Relevant Orders    CARD ECHO STRESS ECHO/REST AND STRESS(CPT=93350/44735 [facility] [address])    2019 NOVEL CORONAVIRUS SARS-COV-2 BY PCR([facility])    Essential hypertension    Relevant

## 2020-08-06 ENCOUNTER — OFFICE VISIT (OUTPATIENT)
Dept: SURGERY | Facility: CLINIC | Age: 75
End: 2020-08-06
Payer: MEDICARE

## 2020-08-06 VITALS — SYSTOLIC BLOOD PRESSURE: 150 MMHG | HEART RATE: 62 BPM | DIASTOLIC BLOOD PRESSURE: 87 MMHG

## 2020-08-06 DIAGNOSIS — Z78.9 INTERMITTENT SELF-CATHETERIZATION OF BLADDER: ICD-10-CM

## 2020-08-06 DIAGNOSIS — N40.1 BENIGN PROSTATIC HYPERPLASIA WITH WEAK URINARY STREAM: ICD-10-CM

## 2020-08-06 DIAGNOSIS — R39.12 BENIGN PROSTATIC HYPERPLASIA WITH WEAK URINARY STREAM: ICD-10-CM

## 2020-08-06 DIAGNOSIS — N31.2 ATONIC URINARY BLADDER: Primary | ICD-10-CM

## 2020-08-06 DIAGNOSIS — R33.9 URINARY RETENTION: ICD-10-CM

## 2020-08-06 PROCEDURE — 99213 OFFICE O/P EST LOW 20 MIN: CPT | Performed by: UROLOGY

## 2020-08-06 PROCEDURE — G0463 HOSPITAL OUTPT CLINIC VISIT: HCPCS | Performed by: UROLOGY

## 2020-08-06 RX ORDER — FINASTERIDE 5 MG/1
5 TABLET, FILM COATED ORAL DAILY
Qty: 90 TABLET | Refills: 3 | Status: SHIPPED | OUTPATIENT
Start: 2020-08-06 | End: 2021-12-15

## 2020-08-06 NOTE — PROGRESS NOTES
[facility], [facility] Urology  Follow-Up Visit    HPI: Britt Valadez is a 77year old male presents for a follow up visit. Patient was last seen 8/7/2019.      INTERVAL HISTORY: Here for f/u on BPH with atonic bladder, managed with daily CIC; also on finasteri mL's. Bladder filled to 722 mL's with a maximum detrusor pressure of 11.8 cm H2O. Patient was unable to void.   Office cystoscopy revealed a distended, decompensated bladder with trabeculations and cellules consistent with chronic bladder outlet obstruction Ref Rng & Units 0.0 - 4.0 ng/mL   8/15/2018 1.0   1/20/2011 1.0     Component     Latest Ref Rng 7/16/2019 6/25/2019   BUN     7 - 18 mg/dL 21 (H) 20 (H)   CREATININE     0.70 - 1.30 mg/dL 1.50 (H) 1.57 (H)     6/6/19 Urine culture: >100 K pan-sensitive ps

## 2020-09-15 ENCOUNTER — HOSPITAL ENCOUNTER (OUTPATIENT)
Dept: ULTRASOUND IMAGING | Facility: HOSPITAL | Age: 75
Discharge: HOME | End: 2020-09-15
Attending: UROLOGY
Payer: MEDICARE

## 2020-09-15 DIAGNOSIS — Z78.9 INTERMITTENT SELF-CATHETERIZATION OF BLADDER: ICD-10-CM

## 2020-09-15 DIAGNOSIS — N40.1 BENIGN PROSTATIC HYPERPLASIA WITH WEAK URINARY STREAM: ICD-10-CM

## 2020-09-15 DIAGNOSIS — R39.12 BENIGN PROSTATIC HYPERPLASIA WITH WEAK URINARY STREAM: ICD-10-CM

## 2020-09-15 DIAGNOSIS — N31.2 ATONIC URINARY BLADDER: ICD-10-CM

## 2020-09-15 DIAGNOSIS — R33.9 URINARY RETENTION: ICD-10-CM

## 2020-09-15 PROCEDURE — 76857 US EXAM PELVIC LIMITED: CPT | Performed by: UROLOGY

## 2020-10-27 DIAGNOSIS — I25.10 ATHEROSCLEROSIS OF NATIVE CORONARY ARTERY OF NATIVE HEART WITHOUT ANGINA PECTORIS: ICD-10-CM

## 2020-10-27 DIAGNOSIS — F39 EPISODIC MOOD DISORDER (HCC): ICD-10-CM

## 2020-10-27 DIAGNOSIS — E78.2 MIXED HYPERLIPIDEMIA: ICD-10-CM

## 2020-10-27 DIAGNOSIS — F10.20 ALCOHOL USE DISORDER, MODERATE, DEPENDENCE (HCC): ICD-10-CM

## 2020-10-27 DIAGNOSIS — F41.9 ANXIETY: ICD-10-CM

## 2020-10-27 DIAGNOSIS — IMO0001 ALCOHOLISM /ALCOHOL ABUSE: ICD-10-CM

## 2020-10-27 DIAGNOSIS — Z63.0 MARITAL DYSFUNCTION: ICD-10-CM

## 2020-10-27 SDOH — SOCIAL STABILITY - SOCIAL INSECURITY: PROBLEMS IN RELATIONSHIP WITH SPOUSE OR PARTNER: Z63.0

## 2020-10-30 RX ORDER — METOPROLOL SUCCINATE 25 MG/1
25 TABLET, EXTENDED RELEASE ORAL
Qty: 90 TABLET | Refills: 1 | Status: SHIPPED | OUTPATIENT
Start: 2020-10-30 | End: 2021-03-02

## 2020-10-30 RX ORDER — FENOFIBRATE 160 MG/1
160 TABLET ORAL
Qty: 90 TABLET | Refills: 1 | Status: SHIPPED | OUTPATIENT
Start: 2020-10-30 | End: 2021-03-02

## 2020-10-30 RX ORDER — ATORVASTATIN CALCIUM 80 MG/1
80 TABLET, FILM COATED ORAL
Qty: 90 TABLET | Refills: 1 | Status: SHIPPED | OUTPATIENT
Start: 2020-10-30 | End: 2021-03-02

## 2020-12-15 NOTE — ED INITIAL ASSESSMENT (HPI)
Emanuel William arrives via [facility] s/p fall walking home from the liquor store. He admits to drinking 4-5 glasses of wine. Patient with noticeable tremors.     Denies any pain from the fall

## 2020-12-15 NOTE — ED NOTES
rec'd pt from ems, sitting up on cart. States he has been drinking etoh today and daily. Fell pta. Denies injuries. No obvious signs of trauma noted. A&o x4. Security contacted to lock up belongings. Pt wearing hospital gown.  Positioned on cart for comfort

## 2020-12-15 NOTE — ED NOTES
Wife willing to take skye home. md aware. Pt and wife verbalized understanding of dc instructions and appropriate follow up care. Security contacted to get pt belongings.

## 2020-12-16 NOTE — ED PROVIDER NOTES
Patient Seen in: [facility] AND [facility] Emergency Department    History   Patient presents with:  Fall  Alcohol Intoxication      HPI    Patient presents to the ED after falling while coming home from a liquor store. Denies injury in the fall.   He admits to wine per week        Frequency: 4 or more times a week        Drinks per session: 5 or 6      Drug use: No    Other Topics      Concerns:        Caffeine Concern: Yes          Coffee, 2 cups; per NG      ROS  Pertinent Positives:  Fall  All other organ syst found.  ED Medications Administered: Medications - No data to display      [facility]      12/15/20  1520 12/15/20  1635   BP: (!) 148/99 142/90   Pulse: 120 102   Resp: 22 20   Temp: 97.8 °F (36.6 °C)    TempSrc: Oral    SpO2: 97% 97%   Weight: 74.8 kg    Height

## 2020-12-29 ENCOUNTER — OFFICE VISIT (OUTPATIENT)
Dept: FAMILY MEDICINE CLINIC | Facility: CLINIC | Age: 75
End: 2020-12-29
Payer: MEDICARE

## 2020-12-29 VITALS
DIASTOLIC BLOOD PRESSURE: 66 MMHG | BODY MASS INDEX: 23 KG/M2 | SYSTOLIC BLOOD PRESSURE: 108 MMHG | HEART RATE: 73 BPM | WEIGHT: 160.63 LBS | HEIGHT: 70 IN | TEMPERATURE: 97 F

## 2020-12-29 DIAGNOSIS — IMO0001 ALCOHOLISM /ALCOHOL ABUSE: Primary | ICD-10-CM

## 2020-12-29 DIAGNOSIS — Z00.00 WELL ADULT EXAM: ICD-10-CM

## 2020-12-29 DIAGNOSIS — K76.9 LIVER DISEASE, UNSPECIFIED: ICD-10-CM

## 2020-12-29 PROCEDURE — G0463 HOSPITAL OUTPT CLINIC VISIT: HCPCS | Performed by: NURSE PRACTITIONER

## 2020-12-29 PROCEDURE — 99215 OFFICE O/P EST HI 40 MIN: CPT | Performed by: NURSE PRACTITIONER

## 2020-12-29 NOTE — PATIENT INSTRUCTIONS
Understanding the Disease of Addiction  What is addiction? Addiction is a long-lasting (chronic) disease of the brain. Addiction to substances such as drugs and alcohol may be called substance use disorder. It affects how your brain learns and works.  Rudy Paiz The only way to get over an addiction is to stop using the substance. Not using it lets your brain recover and go back to its normal functioning. You can relearn how to find pleasure in other things again.  But your brain will always be at risk for addictio Alcoholics Anonymous  Alcoholics Anonymous (A.A.) helps members get sober and stay sober. They help you build healthy patterns of living. Everyone is welcome at an A. A. meeting. You don't have to identify yourself.  Some people find it easier to go to the f Fact: An alcoholic can have a nice family. And most alcoholics can take care of their families for a long time. Myth: But she doesn’t look like an alcoholic. Fact: Alcoholics don't look a certain way.  And many alcoholics take great care with how they loo

## 2020-12-29 NOTE — PROGRESS NOTES
HPI  Pt here for c/o losing weight. Appetite is low. Pt admits to drinking again. Will not say how much but states it is too much. Says he will try to cut his amount in half and only drink 2-3 drinks per day.  Denies physical symptoms or complaints exce hypertension    • Gastrointestinal complaints, nonspecific     Bowel problems; per NG   • Hearing impairment    • High blood pressure    • High cholesterol    • History of chickenpox     per NG   • History of fracture of arm     per NG   • History of measl Gets together: Not on file        Attends Taoist service: Not on file        Active member of club or organization: Not on file        Attends meetings of clubs or organizations: Not on file        Relationship status: Not on file      Intimate part Soft. He exhibits no distension. Neurological: He is alert and oriented to person, place, and time. Psychiatric: His mood appears depressed. He expresses inappropriate judgment.    Pt states he will cut back on his drinking 50% and only have 2-3 drinks

## 2021-01-21 ENCOUNTER — TELEPHONE (OUTPATIENT)
Dept: FAMILY MEDICINE CLINIC | Facility: CLINIC | Age: 76
End: 2021-01-21

## 2021-01-22 NOTE — TELEPHONE ENCOUNTER
COFCO message sent to patient. Patient Review of Clinical Information    Problems   The patient or proxy has not reviewed this information.    Medications   The patient or proxy has not reviewed this information, and there are updates pending:   Vicenta

## 2021-01-28 NOTE — TELEPHONE ENCOUNTER
Medication list updated.     Kathy Gaston RN Yesterday (9:32 AM)        Yes, remove Sertraline from  my medication list.

## 2021-02-23 ENCOUNTER — OFFICE VISIT (OUTPATIENT)
Dept: AUDIOLOGY | Facility: CLINIC | Age: 76
End: 2021-02-23
Payer: MEDICARE

## 2021-02-23 DIAGNOSIS — H90.3 SENSORINEURAL HEARING LOSS, BILATERAL: Primary | ICD-10-CM

## 2021-02-23 PROCEDURE — 92593 HEARING AID CHECK, BOTH EARS: CPT | Performed by: AUDIOLOGIST

## 2021-02-24 NOTE — PROGRESS NOTES
HEARING AID FOLLOW-UP    Sherman Rothman  7/31/1945  [id number]    Otoscopic Inspection:  right ear: excessive cerumen and TM could not be visualized  left ear: moderate cerumen and TM partially visualized    Patient noted he is seeing his primary physici months or as needed    2/24/2021  Devan Oliveira

## 2021-03-02 ENCOUNTER — OFFICE VISIT (OUTPATIENT)
Dept: FAMILY MEDICINE CLINIC | Facility: CLINIC | Age: 76
End: 2021-03-02
Payer: MEDICARE

## 2021-03-02 VITALS
BODY MASS INDEX: 23.19 KG/M2 | SYSTOLIC BLOOD PRESSURE: 127 MMHG | TEMPERATURE: 97 F | WEIGHT: 162 LBS | HEIGHT: 70 IN | DIASTOLIC BLOOD PRESSURE: 71 MMHG | HEART RATE: 87 BPM

## 2021-03-02 DIAGNOSIS — Z11.4 SCREENING FOR HIV (HUMAN IMMUNODEFICIENCY VIRUS): ICD-10-CM

## 2021-03-02 DIAGNOSIS — IMO0001 ALCOHOLISM /ALCOHOL ABUSE: ICD-10-CM

## 2021-03-02 DIAGNOSIS — H91.93 DECREASED HEARING OF BOTH EARS: ICD-10-CM

## 2021-03-02 DIAGNOSIS — I25.10 ATHEROSCLEROSIS OF NATIVE CORONARY ARTERY OF NATIVE HEART WITHOUT ANGINA PECTORIS: ICD-10-CM

## 2021-03-02 DIAGNOSIS — Z00.00 ADULT GENERAL MEDICAL EXAM: Primary | ICD-10-CM

## 2021-03-02 DIAGNOSIS — E78.2 MIXED HYPERLIPIDEMIA: ICD-10-CM

## 2021-03-02 DIAGNOSIS — N40.1 BENIGN PROSTATIC HYPERPLASIA WITH LOWER URINARY TRACT SYMPTOMS, SYMPTOM DETAILS UNSPECIFIED: ICD-10-CM

## 2021-03-02 DIAGNOSIS — H61.23 EXCESSIVE CERUMEN IN EAR CANAL, BILATERAL: ICD-10-CM

## 2021-03-02 DIAGNOSIS — Z00.00 ENCOUNTER FOR ANNUAL HEALTH EXAMINATION: ICD-10-CM

## 2021-03-02 DIAGNOSIS — F10.20 ALCOHOL USE DISORDER, MODERATE, DEPENDENCE (HCC): ICD-10-CM

## 2021-03-02 DIAGNOSIS — Z11.59 NEED FOR HEPATITIS C SCREENING TEST: ICD-10-CM

## 2021-03-02 DIAGNOSIS — Z12.11 SCREENING FOR COLON CANCER: ICD-10-CM

## 2021-03-02 DIAGNOSIS — F39 EPISODIC MOOD DISORDER (HCC): ICD-10-CM

## 2021-03-02 DIAGNOSIS — F41.9 ANXIETY: ICD-10-CM

## 2021-03-02 PROCEDURE — G0439 PPPS, SUBSEQ VISIT: HCPCS | Performed by: FAMILY MEDICINE

## 2021-03-02 PROCEDURE — 99213 OFFICE O/P EST LOW 20 MIN: CPT | Performed by: FAMILY MEDICINE

## 2021-03-02 RX ORDER — FENOFIBRATE 160 MG/1
160 TABLET ORAL
Qty: 90 TABLET | Refills: 1 | Status: SHIPPED | OUTPATIENT
Start: 2021-03-02 | End: 2021-05-03

## 2021-03-02 RX ORDER — METOPROLOL SUCCINATE 25 MG/1
25 TABLET, EXTENDED RELEASE ORAL
Qty: 90 TABLET | Refills: 1 | Status: SHIPPED | OUTPATIENT
Start: 2021-03-02 | End: 2021-05-03

## 2021-03-02 RX ORDER — ATORVASTATIN CALCIUM 80 MG/1
80 TABLET, FILM COATED ORAL
Qty: 90 TABLET | Refills: 1 | Status: SHIPPED | OUTPATIENT
Start: 2021-03-02 | End: 2021-05-03

## 2021-03-02 RX ORDER — ACAMPROSATE CALCIUM 333 MG/1
333 TABLET, DELAYED RELEASE ORAL 3 TIMES DAILY
Qty: 90 TABLET | Refills: 1 | Status: SHIPPED | OUTPATIENT
Start: 2021-03-02 | End: 2021-05-03

## 2021-03-02 NOTE — PATIENT INSTRUCTIONS
You may want to inquire about the Shingrix vaccine to prevent shingles. This is given at your pharmacy. 2 shots, 2 months apart. For the ears you may want to get some over-the-counter Debrox drops to use for the next 10 to 14 days.   If you still feel screening (once between ages 73-68)  No results found for this or any previous visit.  Limited to patients who meet one of the following criteria:   • Men who are 73-68 years old and have smoked more than 100 cigarettes in their lifetime   • Anyone with a f mentally retarded   Persons who live in the same house as a HepB virus carrier   Homosexual men   Illicit injectable drug abusers     Tetanus Toxoid- Only covered with a cut with metal- TD and TDaP Not covered by Medicare Part B) No orders found for this o

## 2021-03-02 NOTE — PROGRESS NOTES
HPI:   Dileep Ramirez is a 77year old male who presents for a Medicare Subsequent Annual Wellness visit (Pt already had Initial Annual Wellness).     His last annual assessment has been over 1 year: Annual Physical due on 07/31/1948      Last seen by me \"Flag\": Correct  Recall \"Tree\": Correct       Functional Ability/Status   Marie Atkinson has some abnormal functions as listed below:  He has Hearing problems based on screening of functional status.    Hearing Problems?: Yes  He has problems with Mem Medicine)  Nan Medley DO as PCP - Mini Ron MD (NEUROLOGY)    Patient Active Problem List:     Hypercholesteremia     Atherosclerosis of native coronary artery of native heart without angina pectoris     Essential hypertension     Al dependence ([facility] [address] Carotid artery disease ([facility] [address] Cataract, Coronary artery disease, Essential hypertension, Gastrointestinal complaints, nonspecific, Hearing impairment, High blood pressure, High cholesterol, History of chickenpox, History of fracture of ar restaurant: Sometimes   I get confused about where sounds come from: Sometimes I misunderstand some words in a sentence and need to ask people to repeat themselves: No   I especially have trouble understanding the speech of women and children: Sometimes I 11/01/2017   • FLU VAC High Dose 65 YRS & Older PRSV Free (82433) 09/07/2019   • Fluzone Vaccine Medicare () 10/30/2016, 10/16/2017   • Influenza 10/01/2015, 11/01/2016, 09/30/2017   • Pneumococcal (Prevnar 13) 03/05/2013   • Pneumovax 23 03/12/2014, examination    Screening for HIV (human immunodeficiency virus)  -     HIV AG AB COMBO; Future    Need for hepatitis C screening test  -     HCV ANTIBODY;  Future         Diet assessment: good     PLAN:  The patient indicates understanding of these issues a 08/15/2020  Update Health Maintenance if applicable     Immunizations (Update Immunization Activity if applicable)     Influenza  Covered Annually No vaccine history found Please get every year    Pneumococcal 13 (Prevnar)  Covered Once after 65 03/05/2013

## 2021-03-04 DIAGNOSIS — Z23 NEED FOR VACCINATION: ICD-10-CM

## 2021-03-05 ENCOUNTER — LAB ENCOUNTER (OUTPATIENT)
Dept: LAB | Age: 76
End: 2021-03-05
Attending: FAMILY MEDICINE
Payer: MEDICARE

## 2021-03-05 DIAGNOSIS — Z11.4 SCREENING FOR HIV (HUMAN IMMUNODEFICIENCY VIRUS): ICD-10-CM

## 2021-03-05 DIAGNOSIS — Z11.59 NEED FOR HEPATITIS C SCREENING TEST: ICD-10-CM

## 2021-03-05 DIAGNOSIS — Z00.00 WELL ADULT EXAM: ICD-10-CM

## 2021-03-05 DIAGNOSIS — K76.9 LIVER DISEASE, UNSPECIFIED: ICD-10-CM

## 2021-03-05 LAB
ALBUMIN SERPL-MCNC: 4 G/DL (ref 3.4–5)
ALBUMIN/GLOB SERPL: 1.1 {RATIO} (ref 1–2)
ALP LIVER SERPL-CCNC: 106 U/L
ALT SERPL-CCNC: 27 U/L
ANION GAP SERPL CALC-SCNC: 8 MMOL/L (ref 0–18)
AST SERPL-CCNC: 22 U/L (ref 15–37)
BILIRUB SERPL-MCNC: 0.6 MG/DL (ref 0.1–2)
BUN BLD-MCNC: 29 MG/DL (ref 7–18)
BUN/CREAT SERPL: 19.2 (ref 10–20)
CALCIUM BLD-MCNC: 9.5 MG/DL (ref 8.5–10.1)
CHLORIDE SERPL-SCNC: 104 MMOL/L (ref 98–112)
CHOLEST SMN-MCNC: 137 MG/DL (ref ?–200)
CO2 SERPL-SCNC: 26 MMOL/L (ref 21–32)
CREAT BLD-MCNC: 1.51 MG/DL
DEPRECATED RDW RBC AUTO: 41.5 FL (ref 35.1–46.3)
ERYTHROCYTE [DISTWIDTH] IN BLOOD BY AUTOMATED COUNT: 11.4 % (ref 11–15)
EST. AVERAGE GLUCOSE BLD GHB EST-MCNC: 157 MG/DL (ref 68–126)
GLOBULIN PLAS-MCNC: 3.6 G/DL (ref 2.8–4.4)
GLUCOSE BLD-MCNC: 163 MG/DL (ref 70–99)
HBA1C MFR BLD HPLC: 7.1 % (ref ?–5.7)
HCT VFR BLD AUTO: 38.5 %
HCV AB SERPL QL IA: NONREACTIVE
HDLC SERPL-MCNC: 48 MG/DL (ref 40–59)
HGB BLD-MCNC: 12.6 G/DL
LDLC SERPL CALC-MCNC: 73 MG/DL (ref ?–100)
M PROTEIN MFR SERPL ELPH: 7.6 G/DL (ref 6.4–8.2)
MCH RBC QN AUTO: 32.5 PG (ref 26–34)
MCHC RBC AUTO-ENTMCNC: 32.7 G/DL (ref 31–37)
MCV RBC AUTO: 99.2 FL
NONHDLC SERPL-MCNC: 89 MG/DL (ref ?–130)
OSMOLALITY SERPL CALC.SUM OF ELEC: 295 MOSM/KG (ref 275–295)
PATIENT FASTING Y/N/NP: YES
PATIENT FASTING Y/N/NP: YES
PLATELET # BLD AUTO: 361 10(3)UL (ref 150–450)
POTASSIUM SERPL-SCNC: 3.8 MMOL/L (ref 3.5–5.1)
RBC # BLD AUTO: 3.88 X10(6)UL
SODIUM SERPL-SCNC: 138 MMOL/L (ref 136–145)
TRIGL SERPL-MCNC: 81 MG/DL (ref 30–149)
TSI SER-ACNC: 1.08 MIU/ML (ref 0.36–3.74)
VIT B12 SERPL-MCNC: 757 PG/ML (ref 193–986)
VLDLC SERPL CALC-MCNC: 16 MG/DL (ref 0–30)
WBC # BLD AUTO: 10.2 X10(3) UL (ref 4–11)

## 2021-03-05 PROCEDURE — 84443 ASSAY THYROID STIM HORMONE: CPT

## 2021-03-05 PROCEDURE — 82306 VITAMIN D 25 HYDROXY: CPT

## 2021-03-05 PROCEDURE — 80053 COMPREHEN METABOLIC PANEL: CPT

## 2021-03-05 PROCEDURE — 83036 HEMOGLOBIN GLYCOSYLATED A1C: CPT

## 2021-03-05 PROCEDURE — 80061 LIPID PANEL: CPT

## 2021-03-05 PROCEDURE — 84425 ASSAY OF VITAMIN B-1: CPT

## 2021-03-05 PROCEDURE — 87389 HIV-1 AG W/HIV-1&-2 AB AG IA: CPT

## 2021-03-05 PROCEDURE — 36415 COLL VENOUS BLD VENIPUNCTURE: CPT

## 2021-03-05 PROCEDURE — 85027 COMPLETE CBC AUTOMATED: CPT

## 2021-03-05 PROCEDURE — 86803 HEPATITIS C AB TEST: CPT

## 2021-03-05 PROCEDURE — 82607 VITAMIN B-12: CPT

## 2021-03-08 LAB — 25(OH)D3 SERPL-MCNC: 27.1 NG/ML (ref 30–100)

## 2021-03-10 LAB — VITAMIN B1 (THIAMINE), WHOLE B: 211 NMOL/L

## 2021-03-16 NOTE — PROGRESS NOTES
Patient ID: Jack Jacobson is a 77year old male. HPI  Patient presents with:  Complete Form    Last seen by me on 3/2/2021. Pt is here to fill out the office of the   services form.  Pt states his license was taken while h hyperlipidemia     per NG       Past Surgical History:   Procedure Laterality Date   • CABG  2007    per NG   • CATARACT     • TONSILLECTOMY            Current Outpatient Medications   Medication Sig Dispense Refill   • Acamprosate Calcium 333 MG Oral Tab will be pleased that he stopped drinking alcohol. He will stay on the Campral 3 times daily. Essential hypertension  Controlled blood pressure.   Continue present management  Atherosclerosis of native coronary artery of native heart without angina pectori

## 2021-04-16 ENCOUNTER — TELEPHONE (OUTPATIENT)
Dept: SURGERY | Facility: CLINIC | Age: 76
End: 2021-04-16

## 2021-04-16 NOTE — TELEPHONE ENCOUNTER
Received order from [address] [place] [address] for catheters,   Order signed by [facility] and faxed   Confirmation received

## 2021-05-03 ENCOUNTER — OFFICE VISIT (OUTPATIENT)
Dept: FAMILY MEDICINE CLINIC | Facility: CLINIC | Age: 76
End: 2021-05-03
Payer: MEDICARE

## 2021-05-03 VITALS
HEART RATE: 61 BPM | SYSTOLIC BLOOD PRESSURE: 116 MMHG | TEMPERATURE: 98 F | HEIGHT: 70 IN | BODY MASS INDEX: 24.79 KG/M2 | WEIGHT: 173.19 LBS | DIASTOLIC BLOOD PRESSURE: 71 MMHG

## 2021-05-03 DIAGNOSIS — I25.10 ATHEROSCLEROSIS OF NATIVE CORONARY ARTERY OF NATIVE HEART WITHOUT ANGINA PECTORIS: ICD-10-CM

## 2021-05-03 DIAGNOSIS — F39 EPISODIC MOOD DISORDER (HCC): ICD-10-CM

## 2021-05-03 DIAGNOSIS — N28.9 RENAL INSUFFICIENCY: ICD-10-CM

## 2021-05-03 DIAGNOSIS — D64.9 ANEMIA, UNSPECIFIED TYPE: Primary | ICD-10-CM

## 2021-05-03 DIAGNOSIS — F10.20 ALCOHOL USE DISORDER, MODERATE, DEPENDENCE (HCC): ICD-10-CM

## 2021-05-03 DIAGNOSIS — R73.09 ELEVATED HEMOGLOBIN A1C: ICD-10-CM

## 2021-05-03 DIAGNOSIS — F10.10 ALCOHOL ABUSE: ICD-10-CM

## 2021-05-03 DIAGNOSIS — E78.2 MIXED HYPERLIPIDEMIA: ICD-10-CM

## 2021-05-03 DIAGNOSIS — F41.9 ANXIETY: ICD-10-CM

## 2021-05-03 PROCEDURE — 99214 OFFICE O/P EST MOD 30 MIN: CPT | Performed by: FAMILY MEDICINE

## 2021-05-03 RX ORDER — ACAMPROSATE CALCIUM 333 MG/1
333 TABLET, DELAYED RELEASE ORAL 3 TIMES DAILY
Qty: 270 TABLET | Refills: 1 | Status: SHIPPED | OUTPATIENT
Start: 2021-05-03 | End: 2021-07-30

## 2021-05-03 RX ORDER — ATORVASTATIN CALCIUM 80 MG/1
80 TABLET, FILM COATED ORAL
Qty: 90 TABLET | Refills: 1 | Status: SHIPPED | OUTPATIENT
Start: 2021-05-03 | End: 2021-07-30

## 2021-05-03 RX ORDER — FENOFIBRATE 160 MG/1
160 TABLET ORAL
Qty: 90 TABLET | Refills: 1 | Status: SHIPPED | OUTPATIENT
Start: 2021-05-03 | End: 2021-07-30

## 2021-05-03 RX ORDER — METOPROLOL SUCCINATE 25 MG/1
25 TABLET, EXTENDED RELEASE ORAL
Qty: 90 TABLET | Refills: 1 | Status: SHIPPED | OUTPATIENT
Start: 2021-05-03 | End: 2021-07-30

## 2021-05-03 NOTE — PROGRESS NOTES
Patient ID: Giovanny Lopez is a 77year old male. HPI  Patient presents with: Follow - Up: alcoholism mediacation    Last seen by me on 3/16/2021. Pt is taking campral and states he has not had alcohol since January 2021.  Pt reports he is unsure fracture of arm     per NG   • History of measles     per NG   • Hx of tonsillitis 1950    per NG   • Other and unspecified hyperlipidemia     per NG       Past Surgical History:   Procedure Laterality Date   • CABG  2007    per NG   • CATARACT     • TONSI orders for this visit:    Anemia, unspecified type  -     CBC WITH DIFFERENTIAL WITH PLATELET; Future  Minimal per past lab. We will recheck. Renal insufficiency  -     COMP METABOLIC PANEL (14);  Future  Stable but we will recheck  Elevated hemoglobin A1 5/3/2021, 9:39 AM.    I, Arturo Mariscal DO,  personally performed the services described in this documentation. All medical record entries made by the scribe were at my direction and in my presence.   I have reviewed the chart and discharge instructions (if

## 2021-05-04 ENCOUNTER — LAB ENCOUNTER (OUTPATIENT)
Dept: LAB | Age: 76
End: 2021-05-04
Attending: FAMILY MEDICINE
Payer: MEDICARE

## 2021-05-04 DIAGNOSIS — R73.09 ELEVATED HEMOGLOBIN A1C: ICD-10-CM

## 2021-05-04 DIAGNOSIS — D64.9 ANEMIA, UNSPECIFIED TYPE: ICD-10-CM

## 2021-05-04 DIAGNOSIS — N28.9 RENAL INSUFFICIENCY: ICD-10-CM

## 2021-05-04 PROCEDURE — 83036 HEMOGLOBIN GLYCOSYLATED A1C: CPT

## 2021-05-04 PROCEDURE — 85025 COMPLETE CBC W/AUTO DIFF WBC: CPT

## 2021-05-04 PROCEDURE — 36415 COLL VENOUS BLD VENIPUNCTURE: CPT

## 2021-05-04 PROCEDURE — 80053 COMPREHEN METABOLIC PANEL: CPT

## 2021-07-30 ENCOUNTER — OFFICE VISIT (OUTPATIENT)
Dept: FAMILY MEDICINE CLINIC | Facility: CLINIC | Age: 76
End: 2021-07-30
Payer: MEDICARE

## 2021-07-30 VITALS
HEIGHT: 70 IN | HEART RATE: 72 BPM | BODY MASS INDEX: 25.77 KG/M2 | WEIGHT: 180 LBS | SYSTOLIC BLOOD PRESSURE: 149 MMHG | DIASTOLIC BLOOD PRESSURE: 81 MMHG | TEMPERATURE: 98 F

## 2021-07-30 DIAGNOSIS — I25.10 ATHEROSCLEROSIS OF NATIVE CORONARY ARTERY OF NATIVE HEART WITHOUT ANGINA PECTORIS: ICD-10-CM

## 2021-07-30 DIAGNOSIS — M54.50 ACUTE BILATERAL LOW BACK PAIN WITHOUT SCIATICA: ICD-10-CM

## 2021-07-30 DIAGNOSIS — E78.2 MIXED HYPERLIPIDEMIA: ICD-10-CM

## 2021-07-30 DIAGNOSIS — F39 EPISODIC MOOD DISORDER (HCC): ICD-10-CM

## 2021-07-30 DIAGNOSIS — K40.90 RIGHT INGUINAL HERNIA: Primary | ICD-10-CM

## 2021-07-30 DIAGNOSIS — F41.9 ANXIETY: ICD-10-CM

## 2021-07-30 PROCEDURE — 99214 OFFICE O/P EST MOD 30 MIN: CPT | Performed by: FAMILY MEDICINE

## 2021-07-30 RX ORDER — FENOFIBRATE 160 MG/1
160 TABLET ORAL
Qty: 90 TABLET | Refills: 1 | Status: SHIPPED | OUTPATIENT
Start: 2021-07-30

## 2021-07-30 RX ORDER — METOPROLOL SUCCINATE 25 MG/1
25 TABLET, EXTENDED RELEASE ORAL
Qty: 90 TABLET | Refills: 1 | Status: SHIPPED | OUTPATIENT
Start: 2021-07-30 | End: 2021-12-06

## 2021-07-30 RX ORDER — MELOXICAM 15 MG/1
15 TABLET ORAL DAILY
Qty: 30 TABLET | Refills: 0 | Status: SHIPPED | OUTPATIENT
Start: 2021-07-30

## 2021-07-30 RX ORDER — ATORVASTATIN CALCIUM 80 MG/1
80 TABLET, FILM COATED ORAL
Qty: 90 TABLET | Refills: 1 | Status: SHIPPED | OUTPATIENT
Start: 2021-07-30

## 2021-07-30 NOTE — PROGRESS NOTES
Patient ID: Mecca Brock is a 77year old male. HPI  Patient presents with:  Bump: groin area  Low Back Pain: discomfort x 10 days    Last seen by me on 5/3/2021.     Pt c/o bump on right side of the groin which is gradually growing in size x2-3 wee problems; per NG   • Hearing impairment    • High blood pressure    • High cholesterol    • History of chickenpox     per NG   • History of fracture of arm     per NG   • History of measles     per NG   • Hx of tonsillitis 1950    per NG   • Other and unsp swelling in right inguinal area consistent with a hernia. Positive hernia on right with Valsalva. Vitals reviewed.            Assessment/Plan:      Diagnoses and all orders for this visit:    Right inguinal hernia  -     SURGERY - INTERNAL  -     Meloxi this documentation has been prepared under the direction and in the presence of Ramiro Fry DO. Electronically Signed: Yomaira Bacon, 7/30/2021, 8:18 AM.    I, Ramiro Fry DO,  personally performed the services described in this documentation.

## 2021-08-04 ENCOUNTER — OFFICE VISIT (OUTPATIENT)
Dept: SURGERY | Facility: CLINIC | Age: 76
End: 2021-08-04
Payer: MEDICARE

## 2021-08-04 VITALS — HEIGHT: 70 IN | WEIGHT: 170 LBS | BODY MASS INDEX: 24.34 KG/M2

## 2021-08-04 DIAGNOSIS — K40.90 NON-RECURRENT UNILATERAL INGUINAL HERNIA WITHOUT OBSTRUCTION OR GANGRENE: Primary | ICD-10-CM

## 2021-08-04 PROCEDURE — 99204 OFFICE O/P NEW MOD 45 MIN: CPT | Performed by: SURGERY

## 2021-08-05 NOTE — H&P
History and Physical      Alisa Maloney is a 77year old male. HPI   Patient presents with:  Hernia: Pt was referred by Dr. Sharyn Noriega for [facility]. Pt states soft lump in right groin developed a few weeks ago. Pt denies pain, bowel/bladder problems.      HP Occupational History      Occupation: [employer]        Comment: AMA, retired    Tobacco Use      Smoking status: Former Smoker        Packs/day: 1.00        Years: 25.00        Pack years: 25        Types: Cigarettes      Smokeless tobacco: Never Used diagnosis)    77year old male with a moderately large asymptomatic R inguinal hernia, small incidental L inguinal hernia as well. On CIC for urinary retention from BPH, h/o CAD and CABG remotely.   Discussed in detail with patient and options reviewed, nain

## 2021-11-24 ENCOUNTER — NURSE TRIAGE (OUTPATIENT)
Dept: FAMILY MEDICINE CLINIC | Facility: CLINIC | Age: 76
End: 2021-11-24

## 2021-11-24 NOTE — TELEPHONE ENCOUNTER
Action Requested: Summary for Provider     []  Critical Lab, Recommendations Needed  [] Need Additional Advice  []   FYI    []   Need Orders  [] Need Medications Sent to Pharmacy  []  Other     SUMMARY:  Patient states he has a chronic bad back and is havi

## 2021-12-06 ENCOUNTER — OFFICE VISIT (OUTPATIENT)
Dept: FAMILY MEDICINE CLINIC | Facility: CLINIC | Age: 76
End: 2021-12-06
Payer: MEDICARE

## 2021-12-06 VITALS
WEIGHT: 165 LBS | BODY MASS INDEX: 23.62 KG/M2 | HEIGHT: 70 IN | HEART RATE: 99 BPM | DIASTOLIC BLOOD PRESSURE: 92 MMHG | SYSTOLIC BLOOD PRESSURE: 135 MMHG | TEMPERATURE: 98 F

## 2021-12-06 DIAGNOSIS — M79.605 LEFT LEG PAIN: ICD-10-CM

## 2021-12-06 DIAGNOSIS — M54.16 LEFT LUMBAR RADICULOPATHY: Primary | ICD-10-CM

## 2021-12-06 PROCEDURE — 99214 OFFICE O/P EST MOD 30 MIN: CPT | Performed by: FAMILY MEDICINE

## 2021-12-06 RX ORDER — MELOXICAM 15 MG/1
15 TABLET ORAL DAILY
Qty: 30 TABLET | Refills: 0 | Status: SHIPPED | OUTPATIENT
Start: 2021-12-06 | End: 2021-12-23

## 2021-12-06 RX ORDER — METHOCARBAMOL 500 MG/1
500 TABLET, FILM COATED ORAL 3 TIMES DAILY PRN
Qty: 90 TABLET | Refills: 0 | Status: SHIPPED | OUTPATIENT
Start: 2021-12-06 | End: 2021-12-23

## 2021-12-06 NOTE — PROGRESS NOTES
Patient ID: Giovanny Lopez is a 77year old male. HPI  Patient presents with:  Leg Pain: left leg pain x 1 week    Last seen by me on 7/30/2021. Pt c/o constant, moderate left leg pain x1 week.  Pt's left shin feels numb and he has lower back pain Outpatient Medications   Medication Sig Dispense Refill   • Fenofibrate 160 MG Oral Tab Take 1 tablet (160 mg total) by mouth once daily. 90 tablet 1   • atorvastatin 80 MG Oral Tab Take 1 tablet (80 mg total) by mouth once daily.  90 tablet 1   • sertralin by mouth 3 (three) times daily as needed (For muscle discomfort. Helps to relax the muscles. If makes tired, just take at bedtime. ). -     Meloxicam 15 MG Oral Tab; Take 1 tablet (15 mg total) by mouth daily. For pain and inflammation. Take with food.

## 2021-12-08 ENCOUNTER — TELEPHONE (OUTPATIENT)
Dept: FAMILY MEDICINE CLINIC | Facility: CLINIC | Age: 76
End: 2021-12-08

## 2021-12-08 NOTE — TELEPHONE ENCOUNTER
Dr. Hannah Ying (Dr. Wander Valenzuela not in office today) - Please advise. Thank you    RN - wife would like a call back: [phone]    Patient's wife calling (on TIMBO). Patient's date of birth and full name both confirmed.      She read that Alcoholics

## 2021-12-09 NOTE — TELEPHONE ENCOUNTER
If he is still drinking on a regular basis then stick with the methocarbamol 3 times daily and hold off on taking the meloxicam.  Do the stretches we talked about instead.   If it does not get better within the next 7 to 10 days he may need to start physica

## 2021-12-11 NOTE — TELEPHONE ENCOUNTER
Wife (TIMBO) calling back and advised Dr Madelaine Hamilton note and started understanding.  Wife asks what kind of stretching exercises that PCP recommends, informed that per Dr Madelaine Hamilton note during office  visit it  was taught and showed   some stretching exercises t

## 2021-12-15 DIAGNOSIS — R33.9 URINARY RETENTION: ICD-10-CM

## 2021-12-15 RX ORDER — FINASTERIDE 5 MG/1
TABLET, FILM COATED ORAL
Qty: 90 TABLET | Refills: 0 | Status: SHIPPED
Start: 2021-12-15

## 2021-12-15 NOTE — TELEPHONE ENCOUNTER
Patient last seen 8/6/20. No follow up currently scheduled. Labs out of date parameters. Patient contacted and follow up scheduled 01/12/22. Please advise on refill request and any labs or orders prior to follow up.     Benign Prostatic Hypertrophy Medi

## 2021-12-18 DIAGNOSIS — R33.9 URINARY RETENTION: ICD-10-CM

## 2021-12-20 RX ORDER — FINASTERIDE 5 MG/1
TABLET, FILM COATED ORAL
Qty: 90 TABLET | Refills: 3 | OUTPATIENT
Start: 2021-12-20

## 2021-12-20 NOTE — TELEPHONE ENCOUNTER
LOV 8/6/20 RTC 1/12/22 RX refilled on 12/16/21 by [facility]. This is a duplicate request so denied.     Benign Prostatic Hypertrophy Medications      Protocol Criteria:  • Appointment scheduled in the past 12 months or in the next 2 months  • If patients is betwe

## 2021-12-23 ENCOUNTER — TELEMEDICINE (OUTPATIENT)
Dept: FAMILY MEDICINE CLINIC | Facility: CLINIC | Age: 76
End: 2021-12-23
Payer: MEDICARE

## 2021-12-23 DIAGNOSIS — M79.605 LEFT LEG PAIN: ICD-10-CM

## 2021-12-23 DIAGNOSIS — M54.16 LEFT LUMBAR RADICULOPATHY: Primary | ICD-10-CM

## 2021-12-23 PROCEDURE — 99214 OFFICE O/P EST MOD 30 MIN: CPT | Performed by: FAMILY MEDICINE

## 2021-12-23 RX ORDER — PREDNISONE 20 MG/1
TABLET ORAL
Qty: 10 TABLET | Refills: 0 | Status: SHIPPED | OUTPATIENT
Start: 2021-12-23

## 2021-12-23 RX ORDER — TIZANIDINE 4 MG/1
4 TABLET ORAL EVERY 8 HOURS PRN
Qty: 60 TABLET | Refills: 0 | Status: SHIPPED | OUTPATIENT
Start: 2021-12-23

## 2021-12-23 NOTE — PROGRESS NOTES
VIDEO VISIT PROGRESS NOTE (Non-Covid-19)  Todays date: 12/23/2021 1:35 PM    Due to the COVID-19 emergency implementation plan, this patient's visit was converted to a video visit as agreed upon with the patient.     Epic Video Check-In    [facility] methocarbamol and meloxicam for pain down the left leg with no relief. Pt had a lumbar spine XR on 7/11/2016; I reviewed the note. No loss of bowel or bladder control.     Duration of video along with documentation in minutes: 6 minutes and 54 seconds on vi Tab Take 1 tablet (80 mg total) by mouth once daily. 90 tablet 1   • sertraline 50 MG Oral Tab Take 1 tablet (50 mg total) by mouth daily. 90 tablet 1   • Meloxicam 15 MG Oral Tab Take 1 tablet (15 mg total) by mouth daily. For pain and inflammation.   Take 1 tablet (4 mg total) by mouth every 8 (eight) hours as needed (left leg pain). For muscle relaxation. Can possibly make you tired.               Medical History:         Reviewed Allergies:    Ramipril                ANGIOEDEMA    Comment:Swelling to uppe Take 1 tablet (160 mg total) by mouth once daily. 90 tablet 1   • atorvastatin 80 MG Oral Tab Take 1 tablet (80 mg total) by mouth once daily. 90 tablet 1   • sertraline 50 MG Oral Tab Take 1 tablet (50 mg total) by mouth daily.  90 tablet 1   • Meloxicam 1 chart and discharge instructions (if applicable) and agree that the record reflects my personal performance and is accurate and complete.   Kris Jimenez DO, 12/23/2021, 2:21 PM

## 2022-01-04 ENCOUNTER — NURSE TRIAGE (OUTPATIENT)
Dept: FAMILY MEDICINE CLINIC | Facility: CLINIC | Age: 77
End: 2022-01-04

## 2022-01-04 DIAGNOSIS — Z20.822 ENCOUNTER FOR SCREENING LABORATORY TESTING FOR COVID-19 VIRUS: Primary | ICD-10-CM

## 2022-01-04 NOTE — TELEPHONE ENCOUNTER
Action Requested: Summary for Provider     []  Critical Lab, Recommendations Needed  [] Need Additional Advice  [x]   FYI    []   Need Orders  [] Need Medications Sent to Pharmacy  []  Other     SUMMARY: Patient calling with complaint of cough, chills, run

## 2022-01-05 ENCOUNTER — NURSE ONLY (OUTPATIENT)
Dept: LAB | Facility: HOSPITAL | Age: 77
End: 2022-01-05
Attending: FAMILY MEDICINE
Payer: MEDICARE

## 2022-01-05 DIAGNOSIS — Z20.822 ENCOUNTER FOR SCREENING LABORATORY TESTING FOR COVID-19 VIRUS: ICD-10-CM

## 2022-01-06 NOTE — PROGRESS NOTES
VIDEO VISIT PROGRESS NOTE (Non-Covid-19)  Todays date: 1/6/2022 1:21 PM        Most recent Nurse Triage message / Veena Krause message from patient:      Tremaine Talbot RN   Registered Nurse      Telephone Encounter   Signed   Encounter Date:  1/4/2022 and discussed current concerns. If patient is a child then of course the parent or guardian will be giving the verbal consent for the video check-in and of course I will be speaking to this person for this visit.   Note, many times the patient however Negative for dysphoric mood. The patient is not nervous/anxious.             Medical History:      Past Medical History:   Diagnosis Date   • Alcohol dependence ([facility] [address]    • Anxiety    • BPH (benign prostatic hyperplasia)    • Carotid artery disease ([facility] [address] short of breath. Patient has a normal mood and affect. Pt was not slurring his speech.        Assessment / Plan:      Diagnoses and all orders for this visit:    Alcohol abuse  -      NAVIGATOR  Very happy that he wants to get better control over his al at [facility] access [facility]) 10 tablet 0   • tiZANidine 4 MG Oral Tab Take 1 tablet (4 mg total) by mouth every 8 (eight) hours as needed (left leg pain). For muscle relaxation. Can possibly make you tired.  60 tablet 0   • FINASTERIDE 5 MG Oral Tab TAKE ONE TAB the direction and in the presence of Rox Sarkar DO. Electronically Signed: Malik Whitten, 1/6/2022, 1:21 PM.     IArturo DO,  personally performed the services described in this documentation.  [facility]

## 2022-01-08 LAB — SARS-COV-2 RNA RESP QL NAA+PROBE: NOT DETECTED

## 2022-01-12 ENCOUNTER — OFFICE VISIT (OUTPATIENT)
Dept: SURGERY | Facility: CLINIC | Age: 77
End: 2022-01-12
Payer: MEDICARE

## 2022-01-12 DIAGNOSIS — N40.1 BENIGN PROSTATIC HYPERPLASIA WITH WEAK URINARY STREAM: ICD-10-CM

## 2022-01-12 DIAGNOSIS — R39.12 BENIGN PROSTATIC HYPERPLASIA WITH WEAK URINARY STREAM: ICD-10-CM

## 2022-01-12 DIAGNOSIS — R33.9 URINARY RETENTION: Primary | ICD-10-CM

## 2022-01-12 DIAGNOSIS — N31.2 ATONIC URINARY BLADDER: ICD-10-CM

## 2022-01-12 PROCEDURE — 99214 OFFICE O/P EST MOD 30 MIN: CPT | Performed by: UROLOGY

## 2022-01-12 NOTE — PROGRESS NOTES
[address] [facility] Urology  Follow-Up Visit    HPI: Kirtland Severin is a 77year old male presents for a follow up visit. Patient was last seen 8/6/2020. Here by himself.     INTERVAL HISTORY: Here for f/u on BPH with atonic bladder, managed with daily CIC; a after resolved symptomatic UTI with a negative urine culture. AUA SI score at the time was 1 with no symptoms. 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   First sensation at 59 mL's, first desire at 1 reducible, non-tender.) is present. Genitourinary: Right testis shows no mass and no tenderness. Left testis shows no mass and no tenderness. Circumcised. No discharge found. Neurological: He is alert and oriented to person, place, and time.    Skin: Sk daily for BPH with urinary retention and atonic bladder. 2. Continue finasteride. RTC for f/u in 6 months. MDM Complexity: Moderate.  Heavy counseling on the importance of performing CIC to prevent complications in light of patient's atonic uri

## 2022-01-20 ENCOUNTER — PATIENT MESSAGE (OUTPATIENT)
Dept: FAMILY MEDICINE CLINIC | Facility: CLINIC | Age: 77
End: 2022-01-20

## 2022-01-21 NOTE — TELEPHONE ENCOUNTER
From: Mercedes Turk  To: Nato Tatum DO  Sent: 1/20/2022 12:53 PM CST  Subject: Tomorrow's Appt    I will not be able to come to your office tommorw for my appt. It is just about extending or inceasing my alcohol medication so not an immediate need. Chelsey Christian

## 2022-03-26 ENCOUNTER — NURSE TRIAGE (OUTPATIENT)
Dept: FAMILY MEDICINE CLINIC | Facility: CLINIC | Age: 77
End: 2022-03-26

## 2022-03-26 ENCOUNTER — LAB ENCOUNTER (OUTPATIENT)
Dept: LAB | Age: 77
End: 2022-03-26
Attending: FAMILY MEDICINE
Payer: MEDICARE

## 2022-03-26 DIAGNOSIS — R41.3 MEMORY LOSS: ICD-10-CM

## 2022-03-26 DIAGNOSIS — F10.20 ALCOHOLISM (HCC): ICD-10-CM

## 2022-03-26 LAB
ALBUMIN SERPL-MCNC: 4 G/DL (ref 3.4–5)
ALBUMIN/GLOB SERPL: 1.1 {RATIO} (ref 1–2)
ALP LIVER SERPL-CCNC: 110 U/L
ALT SERPL-CCNC: 47 U/L
ANION GAP SERPL CALC-SCNC: 10 MMOL/L (ref 0–18)
AST SERPL-CCNC: 93 U/L (ref 15–37)
BASOPHILS # BLD AUTO: 0.04 X10(3) UL (ref 0–0.2)
BASOPHILS NFR BLD AUTO: 0.5 %
BILIRUB SERPL-MCNC: 0.6 MG/DL (ref 0.1–2)
BUN BLD-MCNC: 22 MG/DL (ref 7–18)
BUN/CREAT SERPL: 16.7 (ref 10–20)
CALCIUM BLD-MCNC: 9.1 MG/DL (ref 8.5–10.1)
CHLORIDE SERPL-SCNC: 96 MMOL/L (ref 98–112)
CO2 SERPL-SCNC: 30 MMOL/L (ref 21–32)
CREAT BLD-MCNC: 1.32 MG/DL
DEPRECATED RDW RBC AUTO: 46.5 FL (ref 35.1–46.3)
EOSINOPHIL # BLD AUTO: 0.01 X10(3) UL (ref 0–0.7)
EOSINOPHIL NFR BLD AUTO: 0.1 %
ERYTHROCYTE [DISTWIDTH] IN BLOOD BY AUTOMATED COUNT: 13.1 % (ref 11–15)
FASTING STATUS PATIENT QL REPORTED: YES
GGT SERPL-CCNC: 67 U/L
GLOBULIN PLAS-MCNC: 3.6 G/DL (ref 2.8–4.4)
GLUCOSE BLD-MCNC: 149 MG/DL (ref 70–99)
HCT VFR BLD AUTO: 44 %
HGB BLD-MCNC: 15 G/DL
IMM GRANULOCYTES # BLD AUTO: 0.03 X10(3) UL (ref 0–1)
IMM GRANULOCYTES NFR BLD: 0.4 %
LYMPHOCYTES # BLD AUTO: 1.04 X10(3) UL (ref 1–4)
LYMPHOCYTES NFR BLD AUTO: 13.1 %
MCH RBC QN AUTO: 33.4 PG (ref 26–34)
MCHC RBC AUTO-ENTMCNC: 34.1 G/DL (ref 31–37)
MCV RBC AUTO: 98 FL
MONOCYTES # BLD AUTO: 0.52 X10(3) UL (ref 0.1–1)
MONOCYTES NFR BLD AUTO: 6.6 %
NEUTROPHILS # BLD AUTO: 6.28 X10 (3) UL (ref 1.5–7.7)
NEUTROPHILS # BLD AUTO: 6.28 X10(3) UL (ref 1.5–7.7)
NEUTROPHILS NFR BLD AUTO: 79.3 %
OSMOLALITY SERPL CALC.SUM OF ELEC: 288 MOSM/KG (ref 275–295)
PLATELET # BLD AUTO: 183 10(3)UL (ref 150–450)
POTASSIUM SERPL-SCNC: 4.1 MMOL/L (ref 3.5–5.1)
PROT SERPL-MCNC: 7.6 G/DL (ref 6.4–8.2)
RBC # BLD AUTO: 4.49 X10(6)UL
SODIUM SERPL-SCNC: 136 MMOL/L (ref 136–145)
VIT B12 SERPL-MCNC: 618 PG/ML (ref 193–986)
WBC # BLD AUTO: 7.9 X10(3) UL (ref 4–11)

## 2022-03-26 PROCEDURE — 82977 ASSAY OF GGT: CPT

## 2022-03-26 PROCEDURE — 82607 VITAMIN B-12: CPT

## 2022-03-26 PROCEDURE — 80053 COMPREHEN METABOLIC PANEL: CPT

## 2022-03-26 PROCEDURE — 36415 COLL VENOUS BLD VENIPUNCTURE: CPT

## 2022-03-26 PROCEDURE — 85025 COMPLETE CBC W/AUTO DIFF WBC: CPT

## 2022-03-29 ENCOUNTER — TELEPHONE (OUTPATIENT)
Dept: FAMILY MEDICINE CLINIC | Facility: CLINIC | Age: 77
End: 2022-03-29
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
## 2022-04-02 NOTE — TELEPHONE ENCOUNTER
Patient's wife Mayank Hankins (on TIMBO) is calling back. Advised her of the note below. Per Mayank Hankins she spoke to her  and was advised that she does not need guardianship, but was advised that she can be considered her 's agent. Per Mayank Hankins, if the doctor writes a detailed letter to state that the patient is incapable of making any decisions for himself, including healthcare due to his disease this will help her to become his agent. Per Mayank Hankins, this morning he was so inebriated he wet himself and had diarrhea all over the bathroom and she is having to clean this up. She found 4 bottles of alcohol in his bed. She is crying on the call. I provided the office fax # to her in case she needs to send any specific forms. She has no fax machine but can go to [employer] once she's finished cleaning up everything. Onsite staff, please check for any incoming faxes,     Dr Baldomero Bush, please advise on possible documentation via a letter that may help.  Thanks

## 2022-04-04 ENCOUNTER — TELEPHONE (OUTPATIENT)
Dept: FAMILY MEDICINE CLINIC | Facility: CLINIC | Age: 77
End: 2022-04-04

## 2022-04-04 ENCOUNTER — APPOINTMENT (OUTPATIENT)
Dept: CT IMAGING | Facility: HOSPITAL | Age: 77
End: 2022-04-04
Attending: EMERGENCY MEDICINE
Payer: MEDICARE

## 2022-04-04 PROBLEM — F10.939 ALCOHOL WITHDRAWAL SYNDROME WITH COMPLICATION (HCC): Status: ACTIVE | Noted: 2022-04-04

## 2022-04-04 PROBLEM — F10.239 ALCOHOL WITHDRAWAL SYNDROME WITH COMPLICATION (HCC): Status: ACTIVE | Noted: 2022-04-04

## 2022-04-04 PROCEDURE — 70450 CT HEAD/BRAIN W/O DYE: CPT | Performed by: EMERGENCY MEDICINE

## 2022-04-04 NOTE — TELEPHONE ENCOUNTER
Spoke with pt's wife, ANSON flood. She wants to f/u regarding the legal documents she faxed over in the office last Friday. She requested Dr No Browne to write a letter stating that pt is not capable in making a medical decision for himself. She stated pt only drink wine and he run out of wine, he beg her to get more wine for him, he doesn't drink water just wine. Today pt urine looks dark giselle and he's dehydrated. She stated this papers are urgent. Site staff pls f/u.   pls advise, thanks in advance.

## 2022-04-04 NOTE — TELEPHONE ENCOUNTER
Contacted Dr. Camarillo Minor RE: spouse's message below      give him another 2 oz and if needs another 2 oz in 1 hr that is ok. If you want to write a basic letter for now so she can get him to ER and pend it for me, go ahead and that way she can get a break and he can be hospitalized. Spoke with spouse and relayed Dr. Fatuma Lyon message above--she verbalizes understanding--requesting letter stating patient no longer able to make sound decisions, concerned about dehydration and that patient needs to be evaluated in ER today. \"Everything is in the papers from our  I faxed over that Friday. \"    Letter pended for approval/signature

## 2022-04-04 NOTE — ED NOTES
Spoke to [address] and to [id number]. Patient seen for ETOH. No safety issues identified. Referrals provided.

## 2022-04-04 NOTE — TELEPHONE ENCOUNTER
Spoke with Han El ( verified) and relayed Dr. Hoff Speak message below:    \"What's a video visit going to do? I want to get him to the ER because I think he's dehydrated. I have called paramedics before, but he is clear enough to say his name and date of birth and they won't take him to the hospital. I need Dr. Sabine Eaton to write that letter and advise me on how much wine he can drink? I gave him 4 ounces of wine at 11:30 a.m. today. I have gone through this before--he was shaking badly. I need Dr. Sabine Eaton to advise me today. I don't think Dr. Sabine Eaton understands how bad he is right now. \"    Offered to call 911 for spouse--she declines, as per message above    Please reply to drew: CHRISTOPH Calvert

## 2022-04-04 NOTE — TELEPHONE ENCOUNTER
Spoke with spouse and relayed Dr. Haydee Stark did complete letter as requested and can view/print via [employer]--she verbalizes understanding and agreement. If she cannot get patient in car, she will call an ambulance--she will provide them will POA papers as well as today's letter. No further questions/concerns at this time.

## 2022-04-05 PROBLEM — F10.20 ALCOHOL DEPENDENCE, CONTINUOUS (HCC): Status: ACTIVE | Noted: 2018-03-05

## 2022-04-05 PROBLEM — F10.231 ALCOHOL WITHDRAWAL SYNDROME, WITH DELIRIUM (HCC): Status: ACTIVE | Noted: 2022-04-04

## 2022-04-05 PROBLEM — F10.931 ALCOHOL WITHDRAWAL SYNDROME, WITH DELIRIUM (HCC): Status: ACTIVE | Noted: 2022-04-04

## 2022-04-05 NOTE — PLAN OF CARE
Received pt from ED with alcohol withdrawal syndrome, CIWA precautions being followed per protocol. No pain voiced from pt. No complications noted. Continuous telemetry and pulse oximetry monitoring. Continuous seizure & fall precautions in place. 0.9 sodium chloride infusing @100 ml.hr continuously. Med requisition completed with wife, Aidan Wagner, via the phone. Admission questionnaire completed with wife, Aidan Wagner, via the phone. Psych Liason/Psych on consult. PT/OT on consult. Call light placed within reach. Safety precautions in place, frequent rounding on pt. Problem: Patient Centered Care  Goal: Patient preferences are identified and integrated in the patient's plan of care  Description: Interventions:  - What would you like us to know as we care for you? I live at home with my Wife.   - Provide timely, complete, and accurate information to patient/family  - Incorporate patient and family knowledge, values, beliefs, and cultural backgrounds into the planning and delivery of care  - Encourage patient/family to participate in care and decision-making at the level they choose  - Honor patient and family perspectives and choices  Outcome: Progressing     Problem: Diabetes/Glucose Control  Goal: Glucose maintained within prescribed range  Description: INTERVENTIONS:  - Monitor Blood Glucose as ordered  - Assess for signs and symptoms of hyperglycemia and hypoglycemia  - Administer ordered medications to maintain glucose within target range  - Assess barriers to adequate nutritional intake and initiate nutrition consult as needed  - Instruct patient on self management of diabetes  Outcome: Progressing     Problem: Patient/Family Goals  Goal: Patient/Family Long Term Goal  Description: Patient's Long Term Goal: To stop drinking everyday    Interventions:  -Follow protocol for CIWA  -Administer alcohol withdrawal medications as needed   -Monitor for any acute mental changes  - See additional Care Plan goals for specific interventions  Outcome: Not Progressing  Goal: Patient/Family Short Term Goal  Description: Patient's Short Term Goal: To feel better, and go back with my wife. Interventions:   - Administer medications as prescribed  -Monitor vital signs routinely  -Follow PT/OT recommendations  - See additional Care Plan goals for specific interventions  Outcome: Not Progressing     Problem: SAFETY ADULT - FALL  Goal: Free from fall injury  Description: INTERVENTIONS:  - Assess pt frequently for physical needs  - Identify cognitive and physical deficits and behaviors that affect risk of falls. - [place] fall precautions as indicated by assessment.  - Educate pt/family on patient safety including physical limitations  - Instruct pt to call for assistance with activity based on assessment  - Modify environment to reduce risk of injury  - Provide assistive devices as appropriate  - Consider OT/PT consult to assist with strengthening/mobility  - Encourage toileting schedule  Outcome: Progressing     Problem: CARDIOVASCULAR - ADULT  Goal: Maintains optimal cardiac output and hemodynamic stability  Description: INTERVENTIONS:  - Monitor vital signs, rhythm, and trends  - Monitor for bleeding, hypotension and signs of decreased cardiac output  - Evaluate effectiveness of vasoactive medications to optimize hemodynamic stability  - Monitor arterial and/or venous puncture sites for bleeding and/or hematoma  - Assess quality of pulses, skin color and temperature  - Assess for signs of decreased coronary artery perfusion - ex.  Angina  - Evaluate fluid balance, assess for edema, trend weights  Outcome: Progressing  Goal: Absence of cardiac arrhythmias or at baseline  Description: INTERVENTIONS:  - Continuous cardiac monitoring, monitor vital signs, obtain 12 lead EKG if indicated  - Evaluate effectiveness of antiarrhythmic and heart rate control medications as ordered  - Initiate emergency measures for life threatening arrhythmias  - Monitor electrolytes and administer replacement therapy as ordered  Outcome: Progressing     Problem: RESPIRATORY - ADULT  Goal: Achieves optimal ventilation and oxygenation  Description: INTERVENTIONS:  - Assess for changes in respiratory status  - Assess for changes in mentation and behavior  - Position to facilitate oxygenation and minimize respiratory effort  - Oxygen supplementation based on oxygen saturation or ABGs  - Provide Smoking Cessation handout, if applicable  - Encourage broncho-pulmonary hygiene including cough, deep breathe, Incentive Spirometry  - Assess the need for suctioning and perform as needed  - Assess and instruct to report SOB or any respiratory difficulty  - Respiratory Therapy support as indicated  - Manage/alleviate anxiety  - Monitor for signs/symptoms of CO2 retention  Outcome: Progressing     Problem: GASTROINTESTINAL - ADULT  Goal: Minimal or absence of nausea and vomiting  Description: INTERVENTIONS:  - Maintain adequate hydration with IV or PO as ordered and tolerated  - Nasogastric tube to low intermittent suction as ordered  - Evaluate effectiveness of ordered antiemetic medications  - Provide nonpharmacologic comfort measures as appropriate  - Advance diet as tolerated, if ordered  - Obtain nutritional consult as needed  - Evaluate fluid balance  Outcome: Progressing  Goal: Maintains or returns to baseline bowel function  Description: INTERVENTIONS:  - Assess bowel function  - Maintain adequate hydration with IV or PO as ordered and tolerated  - Evaluate effectiveness of GI medications  - Encourage mobilization and activity  - Obtain nutritional consult as needed  - Establish a toileting routine/schedule  - Consider collaborating with pharmacy to review patient's medication profile  Outcome: Not Progressing  Goal: Maintains adequate nutritional intake (undernourished)  Description: INTERVENTIONS:  - Monitor percentage of each meal consumed  - Identify factors contributing to decreased intake, treat as appropriate  - Assist with meals as needed  - Monitor I&O, WT and lab values  - Obtain nutritional consult as needed  - Optimize oral hygiene and moisture  - Encourage food from home; allow for food preferences  - Enhance eating environment  Outcome: Not Progressing     Problem: METABOLIC/FLUID AND ELECTROLYTES - ADULT  Goal: Glucose maintained within prescribed range  Description: INTERVENTIONS:  - Monitor Blood Glucose as ordered  - Assess for signs and symptoms of hyperglycemia and hypoglycemia  - Administer ordered medications to maintain glucose within target range  - Assess barriers to adequate nutritional intake and initiate nutrition consult as needed  - Instruct patient on self management of diabetes  Outcome: Progressing  Goal: Electrolytes maintained within normal limits  Description: INTERVENTIONS:  - Monitor labs and rhythm and assess patient for signs and symptoms of electrolyte imbalances  - Administer electrolyte replacement as ordered  - Monitor response to electrolyte replacements, including rhythm and repeat lab results as appropriate  - Fluid restriction as ordered  - Instruct patient on fluid and nutrition restrictions as appropriate  Outcome: Progressing  Goal: Hemodynamic stability and optimal renal function maintained  Description: INTERVENTIONS:  - Monitor labs and assess for signs and symptoms of volume excess or deficit  - Monitor intake, output and patient weight  - Monitor urine specific gravity, serum osmolarity and serum sodium as indicated or ordered  - Monitor response to interventions for patient's volume status, including labs, urine output, blood pressure (other measures as available)  - Encourage oral intake as appropriate  - Instruct patient on fluid and nutrition restrictions as appropriate  Outcome: Progressing     Problem: SKIN/TISSUE INTEGRITY - ADULT  Goal: Skin integrity remains intact  Description: INTERVENTIONS  - Assess and document risk factors for pressure ulcer development  - Assess and document skin integrity  - Monitor for areas of redness and/or skin breakdown  - Initiate interventions, skin care algorithm/standards of care as needed  Outcome: Progressing  Goal: Oral mucous membranes remain intact  Description: INTERVENTIONS  - Assess oral mucosa and hygiene practices  - Implement preventative oral hygiene regimen  - Implement oral medicated treatments as ordered  Outcome: Progressing     Problem: HEMATOLOGIC - ADULT  Goal: Maintains hematologic stability  Description: INTERVENTIONS  - Assess for signs and symptoms of bleeding or hemorrhage  - Monitor labs and vital signs for trends  - Administer supportive blood products/factors, fluids and medications as ordered and appropriate  - Administer supportive blood products/factors as ordered and appropriate  Outcome: Progressing  Goal: Free from bleeding injury  Description: (Example usage: patient with low platelets)  INTERVENTIONS:  - Avoid intramuscular injections, enemas and rectal medication administration  - Ensure safe mobilization of patient  - Hold pressure on venipuncture sites to achieve adequate hemostasis  - Assess for signs and symptoms of internal bleeding  - Monitor lab trends  - Patient is to report abnormal signs of bleeding to staff  - Avoid use of toothpicks and dental floss  - Use electric shaver for shaving  - Use soft bristle tooth brush  - Limit straining and forceful nose blowing  Outcome: Progressing     Problem: MUSCULOSKELETAL - ADULT  Goal: Return mobility to safest level of function  Description: INTERVENTIONS:  - Assess patient stability and activity tolerance for standing, transferring and ambulating w/ or w/o assistive devices  - Assist with transfers and ambulation using safe patient handling equipment as needed  - Ensure adequate protection for wounds/incisions during mobilization  - Obtain PT/OT consults as needed  - Advance activity as appropriate  - Communicate ordered activity level and limitations with patient/family  Outcome: Not Progressing  Goal: Maintain proper alignment of affected body part  Description: INTERVENTIONS:  - Support and protect limb and body alignment per provider's orders  - Instruct and reinforce with patient and family use of appropriate assistive device and precautions (e.g. spinal or hip dislocation precautions)  Outcome: Progressing     Problem: NEUROLOGICAL - ADULT  Goal: Achieves stable or improved neurological status  Description: INTERVENTIONS  - Assess for and report changes in neurological status  - Initiate measures to prevent increased intracranial pressure  - Maintain blood pressure and fluid volume within ordered parameters to optimize cerebral perfusion and minimize risk of hemorrhage  - Monitor temperature, glucose, and sodium.  Initiate appropriate interventions as ordered  Outcome: Progressing  Goal: Absence of seizures  Description: INTERVENTIONS  - Monitor for seizure activity  - Administer anti-seizure medications as ordered  - Monitor neurological status  Outcome: Progressing  Goal: Remains free of injury related to seizure activity  Description: INTERVENTIONS:  - Maintain airway, patient safety  and administer oxygen as ordered  - Monitor patient for seizure activity, document and report duration and description of seizure to MD/LIP  - If seizure occurs, turn patient to side and suction secretions as needed  - Reorient patient post seizure  - Seizure pads on all 4 side rails  - Instruct patient/family to notify RN of any seizure activity  - Instruct patient/family to call for assistance with activity based on assessment  Outcome: Progressing  Goal: Achieves maximal functionality and self care  Description: INTERVENTIONS  - Monitor swallowing and airway patency with patient fatigue and changes in neurological status  - Encourage and assist patient to increase activity and self care with guidance from PT/OT  - Encourage visually impaired, hearing impaired and aphasic patients to use assistive/communication devices  Outcome: Not Progressing     Problem: Impaired Functional Mobility  Goal: Achieve highest/safest level of mobility/gait  Description: Interventions:  - Assess patient's functional ability and stability  - Promote increasing activity/tolerance for mobility and gait  - Educate and engage patient/family in tolerated activity level and precautions    Outcome: Not Progressing     Problem: Impaired Activities of Daily Living  Goal: Achieve highest/safest level of independence in self care  Description: Interventions:  - Assess ability and encourage patient to participate in ADLs to maximize function  - Promote sitting position while performing ADLs such as feeding, grooming, and bathing  - Educate and encourage patient/family in tolerated functional activity level and precautions during self-care    Outcome: Not Progressing     Problem: Impaired Cognition  Goal: Patient will exhibit improved attention, thought processing and/or memory  Description: Interventions:    Outcome: Not Progressing

## 2022-04-05 NOTE — BH PROGRESS NOTE
Behavioral Health Note:  REASON FOR ADMISSION:   Alcohol withdrawal    REASON FOR CONSULT:  Psychiatry consultation requested for evaluation and advice d/t CIWA    OBJECTIVE:  Fuad Adamson is a  77year old male who presents d/t first onset of alcohol withdrawal. He has PMH significant for alcohol use disorder, benign prostate biopsy/enlarged prostate, CAD s/p CABG, HTN, HL, chronic renal insufficiency stage 2, DM2. BAL negative upon admission with onset of withdrawal PTA per notes. Psych consult ordered for Dr. Mikel Diaz and [facility] attempted to call Sumeet's wife, [facility], to gather collateral at which time she wasn't available and the voicemail box reported that it was not set up yet and there was no ability to leave a voicemail. [facility] completed note via chart review. CHIEF COMPLAINT:   Sumeet's most recent CIWA score was a 6, scoring on disorientation, anxiety, and tremors. Per chart review with recent reports of his use he has never gone through alcohol withdrawal and has been exhibiting increased tremors over the past 24 hours PTA and has been \"doing nothing but staying in bed drinking wine all day, he has a bottle or box of wine in bed with him at all times\". His last drink was in the morning of 4/4/2022. Per chart review he has been lost a considerable amount of weight d/t his alcohol abuse and likely depression/anxiety. Per chart review he has been experiencing steady increase in memory loss, confusion, and forgetfulness over the past couple of months, with PCP reporting that this is likely d/t his alcohol abuse. Per chart review his wife has been looking for a way to either become his decision maker or something with potential guardianship d/t the severity of his alcoholism.     Per chart review, his alcoholism has been at its worst for the past 3 years, leading to changes in his mood, increased irritability, and he has been recommended to do alcohol treatment multiple times since January 2022, he has refused inpatient alcohol treatment and has been seemingly in denial about what his alcoholism. PAST PSYCHIATRIC HISTORY:  Past diagnosis: alcohol use disorder, episodic mood disorder  Past inpatient: none  Past outpatient: PCP for medication management, seeing marriage counselor with wife d/t severity of alcoholism  Medication: sertraline 50mg, naltrexone 50mg     SOCIAL HISTORY:  Meenu Carranza is a  77year old male who lives in [place] with his wife. He has been drinking 1-2 bottles of wine daily for at least 2-3 years. He has no hx tobacco, cannabis or illicit substance use. MENTAL STATUS EXAM:  FLOR, Ankitae 48 attempted to call wife, Natty Rodriguez, to gather collateral but wasn't able to get in touch with her or leave voicemail d/t a voicemail box that has not been set up yet    [address]  Per chart review, Meenu Carranza has no hx SI/HI/SIB    ASSESSMENT  Meenu Carranza has a dx r/o unspecified alcohol withdrawal syndrome, r/o unspecified alcohol use disorder    PLAN  1. Psych consult ordered for Dr. Dutch Interiano  2.  [facility] will attempt to call Sumeet's wife, Natty Rodriguez, again later today    Ana CORNEJO LPC

## 2022-04-05 NOTE — H&P
[facility]    PATIENT'S NAME: David Ear   ATTENDING PHYSICIAN: José Miguel Millan MD   PATIENT ACCOUNT#:   [id number]    LOCATION:  [place] [address]  MEDICAL RECORD #:   [id number]       YOB: 1945  ADMISSION DATE:       04/04/2022    HISTORY AND PHYSICAL EXAMINATION    CHIEF COMPLAINT:  Alcohol abuse, high risk for alcohol withdrawal.    HISTORY OF PRESENT ILLNESS:  Patient is a 43-year-old  male who is a heavy alcohol drinker, was brought in today to the emergency room for evaluation requesting detox. Urinalysis showed dehydration. Drug screen, CBC, CMP still pending. Magnesium still pending. CT scan of the brain showed no acute findings. Alcohol level less than 3. In the emergency room noted to be slightly tachycardic. PAST MEDICAL HISTORY:  Prolonged history of alcohol consumption. He denies any prior hospitalizations for alcohol detox or alcohol withdrawal.  He had history of benign prostate biopsy, enlarged prostate, coronary artery disease status post coronary artery bypass graft surgery, mild carotid atherosclerosis, hypertension, hyperlipidemia, chronic renal insufficiency stage 2, noninsulin-dependent diabetes mellitus type 2. PAST SURGICAL HISTORY:  Coronary artery bypass graft surgery, cataract procedure, and tonsillectomy. MEDICATIONS:  Please see medication reconciliation list.      ALLERGIES:  Ramipril causes angioedema, side effects to metoprolol. SOCIAL HISTORY:  Ex-tobacco user. Drinks around 1 bottle to 1-1/2 bottle of wine on a daily basis. Lives with his wife. Usually independent in his basic activities of daily living. REVIEW OF SYSTEMS:  Patient reports that he drinks around 1 bottle of wine on a daily basis. He started drinking shortly after breakfast every day, and his last drink usually before he goes to bed.   Patient has not stopped drinking in the last 2 years for over 2 to 3 days, and he does not know if he will go into withdrawal.  He has been having difficulty stopping on his own, and his wife brought him in today and he is requesting detox. Other 12-point review of systems is negative. PHYSICAL EXAMINATION:    GENERAL:  Alert and oriented to time, place, and person. No acute distress. VITAL SIGNS:  Temperature 98.0, pulse 98, respiratory rate 18, blood pressure 194/94, pulse ox 98% on room air. HEENT:  Atraumatic. Oropharynx clear. Moist mucous membranes. Normal hard and soft palate. Eyes:  Anicteric sclerae. NECK:  Supple. No lymphadenopathy. Trachea midline. Full range of motion. LUNGS:  Clear to auscultation bilaterally. Normal respiratory effort. HEART:  Regular rate, rhythm. S1 and S2 auscultated. No murmur. ABDOMEN:  Soft, nondistended. No tenderness. Positive bowel sounds. EXTREMITIES:  No peripheral edema, clubbing, or cyanosis. Both hands fine tremors noted. NEUROLOGIC:  Motor and sensory intact. ASSESSMENT AND PLAN:    1. Alcohol abuse and high risk for alcohol withdrawal, requesting detoxification. 2.   Possible dehydration. 3.   Diabetes mellitus type 2.  4.   History of coronary artery disease. 5.   Hypertension. Patient will be admitted to general medical floor for observation. Monitor closely for any signs or symptoms of alcohol withdrawal and start alcohol detox protocol.  to evaluate patient for long-term management. Further recommendations to follow.     Dictated By Jena Lagos MD  d: 04/04/2022 18:48:16  t: 04/04/2022 20:48:43  Job [id number]  UF/    cc: Shon Tobin MD

## 2022-04-05 NOTE — CM/SW NOTE
22 1000   CM/SW Referral Data   Referral Source Physician;   Reason for Referral Discharge planning   Informant EMR;Clinical Staff Member   Pertinent Medical Hx   Does patient have an established PCP? Yes  ([facility])   Significant Past Medical/Mental Health Hx ETOH abuse   Patient Info   Patient's Current Mental Status at Time of Assessment Alert;Oriented;Memory Impairments   Patient's Home Environment House   Patient Status Prior to Admission   Independent with ADLs and Mobility Yes   Discharge Needs   Anticipated D/C needs Subacute rehab   Services Requested   DON Screen Requested Yes     Pt discussed during nursing rounds. Dx ETOH withdrawal syndrome, DTs. From home w/spouse, ETOH abuse history. PT/OT recommending [facility], referrals sent in [address]. Psych consulted for ETOH, liaison Ana able to see patient today. PASRR level 1 screen submitted, no level 2 requires. Screen sent in [address]. Plan: [facility] pending facility acceptance/choice and medical clearance. / to remain available for support and/or discharge planning.      CHAPARRO Hill    [phone]

## 2022-04-05 NOTE — ED QUICK NOTES
Orders for admission, patient is aware of plan and ready to go upstairs. Any questions, please call ED RN [phone] at [place] [address].      Patient Covid vaccination status: Fully vaccinated     COVID Test Ordered in ED: Rapid SARS-CoV-2 by PCRneg    COVID Suspicion at Admission: Low clinical suspicion for COVID    Running Infusions:  IV fluids     Mental Status/LOC at time of transport: A/O3    Other pertinent information:   CIWA score: 2   NIH score:  N/A

## 2022-04-05 NOTE — PLAN OF CARE
No complaints of pain. Pt on room air. Continuous on CIWA protocol. Wife updated with plan of care. Seizure precautions in place. Bed in low and locked position. Frequent rounding done. Problem: Patient Centered Care  Goal: Patient preferences are identified and integrated in the patient's plan of care  Description: Interventions:  - What would you like us to know as we care for you? I live at home with my Wife. - Provide timely, complete, and accurate information to patient/family  - Incorporate patient and family knowledge, values, beliefs, and cultural backgrounds into the planning and delivery of care  - Encourage patient/family to participate in care and decision-making at the level they choose  - Honor patient and family perspectives and choices  Outcome: Progressing     Problem: Diabetes/Glucose Control  Goal: Glucose maintained within prescribed range  Description: INTERVENTIONS:  - Monitor Blood Glucose as ordered  - Assess for signs and symptoms of hyperglycemia and hypoglycemia  - Administer ordered medications to maintain glucose within target range  - Assess barriers to adequate nutritional intake and initiate nutrition consult as needed  - Instruct patient on self management of diabetes  Outcome: Progressing     Problem: Patient/Family Goals  Goal: Patient/Family Long Term Goal  Description: Patient's Long Term Goal: To stop drinking everyday    Interventions:  -Follow protocol for CIWA  -Administer alcohol withdrawal medications as needed   -Monitor for any acute mental changes  - See additional Care Plan goals for specific interventions  Outcome: Progressing  Goal: Patient/Family Short Term Goal  Description: Patient's Short Term Goal: To feel better, and go back with my wife.     Interventions:   - Administer medications as prescribed  -Monitor vital signs routinely  -Follow PT/OT recommendations  - See additional Care Plan goals for specific interventions  Outcome: Progressing     Problem: SAFETY ADULT - FALL  Goal: Free from fall injury  Description: INTERVENTIONS:  - Assess pt frequently for physical needs  - Identify cognitive and physical deficits and behaviors that affect risk of falls. - [place] fall precautions as indicated by assessment.  - Educate pt/family on patient safety including physical limitations  - Instruct pt to call for assistance with activity based on assessment  - Modify environment to reduce risk of injury  - Provide assistive devices as appropriate  - Consider OT/PT consult to assist with strengthening/mobility  - Encourage toileting schedule  Outcome: Progressing     Problem: CARDIOVASCULAR - ADULT  Goal: Maintains optimal cardiac output and hemodynamic stability  Description: INTERVENTIONS:  - Monitor vital signs, rhythm, and trends  - Monitor for bleeding, hypotension and signs of decreased cardiac output  - Evaluate effectiveness of vasoactive medications to optimize hemodynamic stability  - Monitor arterial and/or venous puncture sites for bleeding and/or hematoma  - Assess quality of pulses, skin color and temperature  - Assess for signs of decreased coronary artery perfusion - ex.  Angina  - Evaluate fluid balance, assess for edema, trend weights  Outcome: Progressing  Goal: Absence of cardiac arrhythmias or at baseline  Description: INTERVENTIONS:  - Continuous cardiac monitoring, monitor vital signs, obtain 12 lead EKG if indicated  - Evaluate effectiveness of antiarrhythmic and heart rate control medications as ordered  - Initiate emergency measures for life threatening arrhythmias  - Monitor electrolytes and administer replacement therapy as ordered  Outcome: Progressing     Problem: RESPIRATORY - ADULT  Goal: Achieves optimal ventilation and oxygenation  Description: INTERVENTIONS:  - Assess for changes in respiratory status  - Assess for changes in mentation and behavior  - Position to facilitate oxygenation and minimize respiratory effort  - Oxygen supplementation based on oxygen saturation or ABGs  - Provide Smoking Cessation handout, if applicable  - Encourage broncho-pulmonary hygiene including cough, deep breathe, Incentive Spirometry  - Assess the need for suctioning and perform as needed  - Assess and instruct to report SOB or any respiratory difficulty  - Respiratory Therapy support as indicated  - Manage/alleviate anxiety  - Monitor for signs/symptoms of CO2 retention  Outcome: Progressing     Problem: GASTROINTESTINAL - ADULT  Goal: Minimal or absence of nausea and vomiting  Description: INTERVENTIONS:  - Maintain adequate hydration with IV or PO as ordered and tolerated  - Nasogastric tube to low intermittent suction as ordered  - Evaluate effectiveness of ordered antiemetic medications  - Provide nonpharmacologic comfort measures as appropriate  - Advance diet as tolerated, if ordered  - Obtain nutritional consult as needed  - Evaluate fluid balance  Outcome: Progressing  Goal: Maintains or returns to baseline bowel function  Description: INTERVENTIONS:  - Assess bowel function  - Maintain adequate hydration with IV or PO as ordered and tolerated  - Evaluate effectiveness of GI medications  - Encourage mobilization and activity  - Obtain nutritional consult as needed  - Establish a toileting routine/schedule  - Consider collaborating with pharmacy to review patient's medication profile  Outcome: Progressing  Goal: Maintains adequate nutritional intake (undernourished)  Description: INTERVENTIONS:  - Monitor percentage of each meal consumed  - Identify factors contributing to decreased intake, treat as appropriate  - Assist with meals as needed  - Monitor I&O, WT and lab values  - Obtain nutritional consult as needed  - Optimize oral hygiene and moisture  - Encourage food from home; allow for food preferences  - Enhance eating environment  Outcome: Progressing     Problem: METABOLIC/FLUID AND ELECTROLYTES - ADULT  Goal: Glucose maintained within prescribed range  Description: INTERVENTIONS:  - Monitor Blood Glucose as ordered  - Assess for signs and symptoms of hyperglycemia and hypoglycemia  - Administer ordered medications to maintain glucose within target range  - Assess barriers to adequate nutritional intake and initiate nutrition consult as needed  - Instruct patient on self management of diabetes  Outcome: Progressing  Goal: Electrolytes maintained within normal limits  Description: INTERVENTIONS:  - Monitor labs and rhythm and assess patient for signs and symptoms of electrolyte imbalances  - Administer electrolyte replacement as ordered  - Monitor response to electrolyte replacements, including rhythm and repeat lab results as appropriate  - Fluid restriction as ordered  - Instruct patient on fluid and nutrition restrictions as appropriate  Outcome: Progressing  Goal: Hemodynamic stability and optimal renal function maintained  Description: INTERVENTIONS:  - Monitor labs and assess for signs and symptoms of volume excess or deficit  - Monitor intake, output and patient weight  - Monitor urine specific gravity, serum osmolarity and serum sodium as indicated or ordered  - Monitor response to interventions for patient's volume status, including labs, urine output, blood pressure (other measures as available)  - Encourage oral intake as appropriate  - Instruct patient on fluid and nutrition restrictions as appropriate  Outcome: Progressing     Problem: SKIN/TISSUE INTEGRITY - ADULT  Goal: Skin integrity remains intact  Description: INTERVENTIONS  - Assess and document risk factors for pressure ulcer development  - Assess and document skin integrity  - Monitor for areas of redness and/or skin breakdown  - Initiate interventions, skin care algorithm/standards of care as needed  Outcome: Progressing  Goal: Oral mucous membranes remain intact  Description: INTERVENTIONS  - Assess oral mucosa and hygiene practices  - Implement preventative oral hygiene regimen  - Implement oral medicated treatments as ordered  Outcome: Progressing     Problem: HEMATOLOGIC - ADULT  Goal: Maintains hematologic stability  Description: INTERVENTIONS  - Assess for signs and symptoms of bleeding or hemorrhage  - Monitor labs and vital signs for trends  - Administer supportive blood products/factors, fluids and medications as ordered and appropriate  - Administer supportive blood products/factors as ordered and appropriate  Outcome: Progressing  Goal: Free from bleeding injury  Description: (Example usage: patient with low platelets)  INTERVENTIONS:  - Avoid intramuscular injections, enemas and rectal medication administration  - Ensure safe mobilization of patient  - Hold pressure on venipuncture sites to achieve adequate hemostasis  - Assess for signs and symptoms of internal bleeding  - Monitor lab trends  - Patient is to report abnormal signs of bleeding to staff  - Avoid use of toothpicks and dental floss  - Use electric shaver for shaving  - Use soft bristle tooth brush  - Limit straining and forceful nose blowing  Outcome: Progressing     Problem: MUSCULOSKELETAL - ADULT  Goal: Return mobility to safest level of function  Description: INTERVENTIONS:  - Assess patient stability and activity tolerance for standing, transferring and ambulating w/ or w/o assistive devices  - Assist with transfers and ambulation using safe patient handling equipment as needed  - Ensure adequate protection for wounds/incisions during mobilization  - Obtain PT/OT consults as needed  - Advance activity as appropriate  - Communicate ordered activity level and limitations with patient/family  Outcome: Progressing  Goal: Maintain proper alignment of affected body part  Description: INTERVENTIONS:  - Support and protect limb and body alignment per provider's orders  - Instruct and reinforce with patient and family use of appropriate assistive device and precautions (e.g. spinal or hip dislocation precautions)  Outcome: Progressing     Problem: NEUROLOGICAL - ADULT  Goal: Achieves stable or improved neurological status  Description: INTERVENTIONS  - Assess for and report changes in neurological status  - Initiate measures to prevent increased intracranial pressure  - Maintain blood pressure and fluid volume within ordered parameters to optimize cerebral perfusion and minimize risk of hemorrhage  - Monitor temperature, glucose, and sodium.  Initiate appropriate interventions as ordered  Outcome: Progressing  Goal: Absence of seizures  Description: INTERVENTIONS  - Monitor for seizure activity  - Administer anti-seizure medications as ordered  - Monitor neurological status  Outcome: Progressing  Goal: Remains free of injury related to seizure activity  Description: INTERVENTIONS:  - Maintain airway, patient safety  and administer oxygen as ordered  - Monitor patient for seizure activity, document and report duration and description of seizure to MD/LIP  - If seizure occurs, turn patient to side and suction secretions as needed  - Reorient patient post seizure  - Seizure pads on all 4 side rails  - Instruct patient/family to notify RN of any seizure activity  - Instruct patient/family to call for assistance with activity based on assessment  Outcome: Progressing  Goal: Achieves maximal functionality and self care  Description: INTERVENTIONS  - Monitor swallowing and airway patency with patient fatigue and changes in neurological status  - Encourage and assist patient to increase activity and self care with guidance from PT/OT  - Encourage visually impaired, hearing impaired and aphasic patients to use assistive/communication devices  Outcome: Progressing     Problem: Impaired Functional Mobility  Goal: Achieve highest/safest level of mobility/gait  Description: Interventions:  - Assess patient's functional ability and stability  - Promote increasing activity/tolerance for mobility and gait  - Educate and engage patient/family in tolerated activity level and precautions    Outcome: Progressing     Problem: Impaired Activities of Daily Living  Goal: Achieve highest/safest level of independence in self care  Description: Interventions:  - Assess ability and encourage patient to participate in ADLs to maximize function  - Promote sitting position while performing ADLs such as feeding, grooming, and bathing  - Educate and encourage patient/family in tolerated functional activity level and precautions during self-care    Outcome: Progressing     Problem: Impaired Cognition  Goal: Patient will exhibit improved attention, thought processing and/or memory  Description: Interventions:    Outcome: Progressing

## 2022-04-06 NOTE — PLAN OF CARE
Received pt with some restlessness, attempting to get out of bed. Pt shows confusion, needing to be re-directed. Continuous CIWA precautions being followed per protocol. No pain voiced from pt. No complications noted. Continuous telemetry and pulse oximetry monitoring. Continuous seizure & fall precautions in place. 0.9 sodium chloride infusing @100 ml.hr continuously. Stool sample negative for C. Diff. Started on IV Rocephin @2000. Call light placed within reach. Safety precautions in place, frequent rounding on pt. Problem: Patient Centered Care  Goal: Patient preferences are identified and integrated in the patient's plan of care  Description: Interventions:  - What would you like us to know as we care for you? I live at home with my Wife.   - Provide timely, complete, and accurate information to patient/family  - Incorporate patient and family knowledge, values, beliefs, and cultural backgrounds into the planning and delivery of care  - Encourage patient/family to participate in care and decision-making at the level they choose  - Honor patient and family perspectives and choices  Outcome: Progressing     Problem: Diabetes/Glucose Control  Goal: Glucose maintained within prescribed range  Description: INTERVENTIONS:  - Monitor Blood Glucose as ordered  - Assess for signs and symptoms of hyperglycemia and hypoglycemia  - Administer ordered medications to maintain glucose within target range  - Assess barriers to adequate nutritional intake and initiate nutrition consult as needed  - Instruct patient on self management of diabetes  Outcome: Progressing     Problem: Patient/Family Goals  Goal: Patient/Family Long Term Goal  Description: Patient's Long Term Goal: To stop drinking everyday    Interventions:  -Follow protocol for CIWA  -Administer alcohol withdrawal medications as needed   -Monitor for any acute mental changes  - See additional Care Plan goals for specific interventions  Outcome: Progressing  Goal: Patient/Family Short Term Goal  Description: Patient's Short Term Goal: To feel better, and go back with my wife. Interventions:   - Administer medications as prescribed  -Monitor vital signs routinely  -Follow PT/OT recommendations  - See additional Care Plan goals for specific interventions  Outcome: Progressing     Problem: SAFETY ADULT - FALL  Goal: Free from fall injury  Description: INTERVENTIONS:  - Assess pt frequently for physical needs  - Identify cognitive and physical deficits and behaviors that affect risk of falls. - [place] fall precautions as indicated by assessment.  - Educate pt/family on patient safety including physical limitations  - Instruct pt to call for assistance with activity based on assessment  - Modify environment to reduce risk of injury  - Provide assistive devices as appropriate  - Consider OT/PT consult to assist with strengthening/mobility  - Encourage toileting schedule  Outcome: Progressing     Problem: CARDIOVASCULAR - ADULT  Goal: Maintains optimal cardiac output and hemodynamic stability  Description: INTERVENTIONS:  - Monitor vital signs, rhythm, and trends  - Monitor for bleeding, hypotension and signs of decreased cardiac output  - Evaluate effectiveness of vasoactive medications to optimize hemodynamic stability  - Monitor arterial and/or venous puncture sites for bleeding and/or hematoma  - Assess quality of pulses, skin color and temperature  - Assess for signs of decreased coronary artery perfusion - ex.  Angina  - Evaluate fluid balance, assess for edema, trend weights  Outcome: Progressing  Goal: Absence of cardiac arrhythmias or at baseline  Description: INTERVENTIONS:  - Continuous cardiac monitoring, monitor vital signs, obtain 12 lead EKG if indicated  - Evaluate effectiveness of antiarrhythmic and heart rate control medications as ordered  - Initiate emergency measures for life threatening arrhythmias  - Monitor electrolytes and administer replacement therapy as ordered  Outcome: Progressing     Problem: RESPIRATORY - ADULT  Goal: Achieves optimal ventilation and oxygenation  Description: INTERVENTIONS:  - Assess for changes in respiratory status  - Assess for changes in mentation and behavior  - Position to facilitate oxygenation and minimize respiratory effort  - Oxygen supplementation based on oxygen saturation or ABGs  - Provide Smoking Cessation handout, if applicable  - Encourage broncho-pulmonary hygiene including cough, deep breathe, Incentive Spirometry  - Assess the need for suctioning and perform as needed  - Assess and instruct to report SOB or any respiratory difficulty  - Respiratory Therapy support as indicated  - Manage/alleviate anxiety  - Monitor for signs/symptoms of CO2 retention  Outcome: Progressing     Problem: GASTROINTESTINAL - ADULT  Goal: Minimal or absence of nausea and vomiting  Description: INTERVENTIONS:  - Maintain adequate hydration with IV or PO as ordered and tolerated  - Nasogastric tube to low intermittent suction as ordered  - Evaluate effectiveness of ordered antiemetic medications  - Provide nonpharmacologic comfort measures as appropriate  - Advance diet as tolerated, if ordered  - Obtain nutritional consult as needed  - Evaluate fluid balance  Outcome: Progressing  Goal: Maintains or returns to baseline bowel function  Description: INTERVENTIONS:  - Assess bowel function  - Maintain adequate hydration with IV or PO as ordered and tolerated  - Evaluate effectiveness of GI medications  - Encourage mobilization and activity  - Obtain nutritional consult as needed  - Establish a toileting routine/schedule  - Consider collaborating with pharmacy to review patient's medication profile  Outcome: Not Progressing  Goal: Maintains adequate nutritional intake (undernourished)  Description: INTERVENTIONS:  - Monitor percentage of each meal consumed  - Identify factors contributing to decreased intake, treat as appropriate  - Assist with meals as needed  - Monitor I&O, WT and lab values  - Obtain nutritional consult as needed  - Optimize oral hygiene and moisture  - Encourage food from home; allow for food preferences  - Enhance eating environment  Outcome: Progressing     Problem: METABOLIC/FLUID AND ELECTROLYTES - ADULT  Goal: Glucose maintained within prescribed range  Description: INTERVENTIONS:  - Monitor Blood Glucose as ordered  - Assess for signs and symptoms of hyperglycemia and hypoglycemia  - Administer ordered medications to maintain glucose within target range  - Assess barriers to adequate nutritional intake and initiate nutrition consult as needed  - Instruct patient on self management of diabetes  Outcome: Progressing  Goal: Electrolytes maintained within normal limits  Description: INTERVENTIONS:  - Monitor labs and rhythm and assess patient for signs and symptoms of electrolyte imbalances  - Administer electrolyte replacement as ordered  - Monitor response to electrolyte replacements, including rhythm and repeat lab results as appropriate  - Fluid restriction as ordered  - Instruct patient on fluid and nutrition restrictions as appropriate  Outcome: Progressing  Goal: Hemodynamic stability and optimal renal function maintained  Description: INTERVENTIONS:  - Monitor labs and assess for signs and symptoms of volume excess or deficit  - Monitor intake, output and patient weight  - Monitor urine specific gravity, serum osmolarity and serum sodium as indicated or ordered  - Monitor response to interventions for patient's volume status, including labs, urine output, blood pressure (other measures as available)  - Encourage oral intake as appropriate  - Instruct patient on fluid and nutrition restrictions as appropriate  Outcome: Progressing     Problem: SKIN/TISSUE INTEGRITY - ADULT  Goal: Skin integrity remains intact  Description: INTERVENTIONS  - Assess and document risk factors for pressure ulcer development  - Assess and document skin integrity  - Monitor for areas of redness and/or skin breakdown  - Initiate interventions, skin care algorithm/standards of care as needed  Outcome: Progressing  Goal: Oral mucous membranes remain intact  Description: INTERVENTIONS  - Assess oral mucosa and hygiene practices  - Implement preventative oral hygiene regimen  - Implement oral medicated treatments as ordered  Outcome: Progressing     Problem: HEMATOLOGIC - ADULT  Goal: Maintains hematologic stability  Description: INTERVENTIONS  - Assess for signs and symptoms of bleeding or hemorrhage  - Monitor labs and vital signs for trends  - Administer supportive blood products/factors, fluids and medications as ordered and appropriate  - Administer supportive blood products/factors as ordered and appropriate  Outcome: Progressing  Goal: Free from bleeding injury  Description: (Example usage: patient with low platelets)  INTERVENTIONS:  - Avoid intramuscular injections, enemas and rectal medication administration  - Ensure safe mobilization of patient  - Hold pressure on venipuncture sites to achieve adequate hemostasis  - Assess for signs and symptoms of internal bleeding  - Monitor lab trends  - Patient is to report abnormal signs of bleeding to staff  - Avoid use of toothpicks and dental floss  - Use electric shaver for shaving  - Use soft bristle tooth brush  - Limit straining and forceful nose blowing  Outcome: Progressing     Problem: MUSCULOSKELETAL - ADULT  Goal: Return mobility to safest level of function  Description: INTERVENTIONS:  - Assess patient stability and activity tolerance for standing, transferring and ambulating w/ or w/o assistive devices  - Assist with transfers and ambulation using safe patient handling equipment as needed  - Ensure adequate protection for wounds/incisions during mobilization  - Obtain PT/OT consults as needed  - Advance activity as appropriate  - Communicate ordered activity level and limitations with patient/family  Outcome: Not Progressing  Goal: Maintain proper alignment of affected body part  Description: INTERVENTIONS:  - Support and protect limb and body alignment per provider's orders  - Instruct and reinforce with patient and family use of appropriate assistive device and precautions (e.g. spinal or hip dislocation precautions)  Outcome: Progressing     Problem: NEUROLOGICAL - ADULT  Goal: Achieves stable or improved neurological status  Description: INTERVENTIONS  - Assess for and report changes in neurological status  - Initiate measures to prevent increased intracranial pressure  - Maintain blood pressure and fluid volume within ordered parameters to optimize cerebral perfusion and minimize risk of hemorrhage  - Monitor temperature, glucose, and sodium.  Initiate appropriate interventions as ordered  Outcome: Progressing  Goal: Absence of seizures  Description: INTERVENTIONS  - Monitor for seizure activity  - Administer anti-seizure medications as ordered  - Monitor neurological status  Outcome: Progressing  Goal: Remains free of injury related to seizure activity  Description: INTERVENTIONS:  - Maintain airway, patient safety  and administer oxygen as ordered  - Monitor patient for seizure activity, document and report duration and description of seizure to MD/LIP  - If seizure occurs, turn patient to side and suction secretions as needed  - Reorient patient post seizure  - Seizure pads on all 4 side rails  - Instruct patient/family to notify RN of any seizure activity  - Instruct patient/family to call for assistance with activity based on assessment  Outcome: Progressing  Goal: Achieves maximal functionality and self care  Description: INTERVENTIONS  - Monitor swallowing and airway patency with patient fatigue and changes in neurological status  - Encourage and assist patient to increase activity and self care with guidance from PT/OT  - Encourage visually impaired, hearing impaired and aphasic patients to use assistive/communication devices  Outcome: Progressing     Problem: Impaired Functional Mobility  Goal: Achieve highest/safest level of mobility/gait  Description: Interventions:  - Assess patient's functional ability and stability  - Promote increasing activity/tolerance for mobility and gait  - Educate and engage patient/family in tolerated activity level and precautions    Outcome: Not Progressing     Problem: Impaired Activities of Daily Living  Goal: Achieve highest/safest level of independence in self care  Description: Interventions:  - Assess ability and encourage patient to participate in ADLs to maximize function  - Promote sitting position while performing ADLs such as feeding, grooming, and bathing  - Educate and encourage patient/family in tolerated functional activity level and precautions during self-care    Outcome: Not Progressing     Problem: Impaired Cognition  Goal: Patient will exhibit improved attention, thought processing and/or memory  Description: Interventions:    Outcome: Not Progressing

## 2022-04-06 NOTE — PLAN OF CARE
Monitoring blood glucose & vital signs per protocol, no acute changes. CIWA protocol remains in place. Wife was at bedside for part of the day. Frequent safety reminders, fall precautions in place. Frequent rounding by nursing staff. Problem: Patient Centered Care  Goal: Patient preferences are identified and integrated in the patient's plan of care  Description: Interventions:  - What would you like us to know as we care for you? I live at home with my Wife. - Provide timely, complete, and accurate information to patient/family  - Incorporate patient and family knowledge, values, beliefs, and cultural backgrounds into the planning and delivery of care  - Encourage patient/family to participate in care and decision-making at the level they choose  - Honor patient and family perspectives and choices  Outcome: Progressing     Problem: Diabetes/Glucose Control  Goal: Glucose maintained within prescribed range  Description: INTERVENTIONS:  - Monitor Blood Glucose as ordered  - Assess for signs and symptoms of hyperglycemia and hypoglycemia  - Administer ordered medications to maintain glucose within target range  - Assess barriers to adequate nutritional intake and initiate nutrition consult as needed  - Instruct patient on self management of diabetes  Outcome: Progressing     Problem: Patient/Family Goals  Goal: Patient/Family Long Term Goal  Description: Patient's Long Term Goal: To stop drinking everyday    Interventions:  -Follow protocol for CIWA  -Administer alcohol withdrawal medications as needed   -Monitor for any acute mental changes  - See additional Care Plan goals for specific interventions  Outcome: Progressing  Goal: Patient/Family Short Term Goal  Description: Patient's Short Term Goal: To feel better, and go back with my wife.     Interventions:   - Administer medications as prescribed  -Monitor vital signs routinely  -Follow PT/OT recommendations  - See additional Care Plan goals for specific interventions  Outcome: Progressing     Problem: SAFETY ADULT - FALL  Goal: Free from fall injury  Description: INTERVENTIONS:  - Assess pt frequently for physical needs  - Identify cognitive and physical deficits and behaviors that affect risk of falls. - [place] fall precautions as indicated by assessment.  - Educate pt/family on patient safety including physical limitations  - Instruct pt to call for assistance with activity based on assessment  - Modify environment to reduce risk of injury  - Provide assistive devices as appropriate  - Consider OT/PT consult to assist with strengthening/mobility  - Encourage toileting schedule  Outcome: Progressing     Problem: CARDIOVASCULAR - ADULT  Goal: Maintains optimal cardiac output and hemodynamic stability  Description: INTERVENTIONS:  - Monitor vital signs, rhythm, and trends  - Monitor for bleeding, hypotension and signs of decreased cardiac output  - Evaluate effectiveness of vasoactive medications to optimize hemodynamic stability  - Monitor arterial and/or venous puncture sites for bleeding and/or hematoma  - Assess quality of pulses, skin color and temperature  - Assess for signs of decreased coronary artery perfusion - ex.  Angina  - Evaluate fluid balance, assess for edema, trend weights  Outcome: Progressing  Goal: Absence of cardiac arrhythmias or at baseline  Description: INTERVENTIONS:  - Continuous cardiac monitoring, monitor vital signs, obtain 12 lead EKG if indicated  - Evaluate effectiveness of antiarrhythmic and heart rate control medications as ordered  - Initiate emergency measures for life threatening arrhythmias  - Monitor electrolytes and administer replacement therapy as ordered  Outcome: Progressing     Problem: RESPIRATORY - ADULT  Goal: Achieves optimal ventilation and oxygenation  Description: INTERVENTIONS:  - Assess for changes in respiratory status  - Assess for changes in mentation and behavior  - Position to facilitate oxygenation and minimize respiratory effort  - Oxygen supplementation based on oxygen saturation or ABGs  - Provide Smoking Cessation handout, if applicable  - Encourage broncho-pulmonary hygiene including cough, deep breathe, Incentive Spirometry  - Assess the need for suctioning and perform as needed  - Assess and instruct to report SOB or any respiratory difficulty  - Respiratory Therapy support as indicated  - Manage/alleviate anxiety  - Monitor for signs/symptoms of CO2 retention  Outcome: Progressing     Problem: GASTROINTESTINAL - ADULT  Goal: Minimal or absence of nausea and vomiting  Description: INTERVENTIONS:  - Maintain adequate hydration with IV or PO as ordered and tolerated  - Nasogastric tube to low intermittent suction as ordered  - Evaluate effectiveness of ordered antiemetic medications  - Provide nonpharmacologic comfort measures as appropriate  - Advance diet as tolerated, if ordered  - Obtain nutritional consult as needed  - Evaluate fluid balance  Outcome: Progressing  Goal: Maintains or returns to baseline bowel function  Description: INTERVENTIONS:  - Assess bowel function  - Maintain adequate hydration with IV or PO as ordered and tolerated  - Evaluate effectiveness of GI medications  - Encourage mobilization and activity  - Obtain nutritional consult as needed  - Establish a toileting routine/schedule  - Consider collaborating with pharmacy to review patient's medication profile  Outcome: Progressing  Goal: Maintains adequate nutritional intake (undernourished)  Description: INTERVENTIONS:  - Monitor percentage of each meal consumed  - Identify factors contributing to decreased intake, treat as appropriate  - Assist with meals as needed  - Monitor I&O, WT and lab values  - Obtain nutritional consult as needed  - Optimize oral hygiene and moisture  - Encourage food from home; allow for food preferences  - Enhance eating environment  Outcome: Progressing     Problem: METABOLIC/FLUID AND ELECTROLYTES - ADULT  Goal: Glucose maintained within prescribed range  Description: INTERVENTIONS:  - Monitor Blood Glucose as ordered  - Assess for signs and symptoms of hyperglycemia and hypoglycemia  - Administer ordered medications to maintain glucose within target range  - Assess barriers to adequate nutritional intake and initiate nutrition consult as needed  - Instruct patient on self management of diabetes  Outcome: Progressing  Goal: Electrolytes maintained within normal limits  Description: INTERVENTIONS:  - Monitor labs and rhythm and assess patient for signs and symptoms of electrolyte imbalances  - Administer electrolyte replacement as ordered  - Monitor response to electrolyte replacements, including rhythm and repeat lab results as appropriate  - Fluid restriction as ordered  - Instruct patient on fluid and nutrition restrictions as appropriate  Outcome: Progressing  Goal: Hemodynamic stability and optimal renal function maintained  Description: INTERVENTIONS:  - Monitor labs and assess for signs and symptoms of volume excess or deficit  - Monitor intake, output and patient weight  - Monitor urine specific gravity, serum osmolarity and serum sodium as indicated or ordered  - Monitor response to interventions for patient's volume status, including labs, urine output, blood pressure (other measures as available)  - Encourage oral intake as appropriate  - Instruct patient on fluid and nutrition restrictions as appropriate  Outcome: Progressing     Problem: SKIN/TISSUE INTEGRITY - ADULT  Goal: Skin integrity remains intact  Description: INTERVENTIONS  - Assess and document risk factors for pressure ulcer development  - Assess and document skin integrity  - Monitor for areas of redness and/or skin breakdown  - Initiate interventions, skin care algorithm/standards of care as needed  Outcome: Progressing  Goal: Oral mucous membranes remain intact  Description: INTERVENTIONS  - Assess oral mucosa and hygiene practices  - Implement preventative oral hygiene regimen  - Implement oral medicated treatments as ordered  Outcome: Progressing     Problem: HEMATOLOGIC - ADULT  Goal: Maintains hematologic stability  Description: INTERVENTIONS  - Assess for signs and symptoms of bleeding or hemorrhage  - Monitor labs and vital signs for trends  - Administer supportive blood products/factors, fluids and medications as ordered and appropriate  - Administer supportive blood products/factors as ordered and appropriate  Outcome: Progressing  Goal: Free from bleeding injury  Description: (Example usage: patient with low platelets)  INTERVENTIONS:  - Avoid intramuscular injections, enemas and rectal medication administration  - Ensure safe mobilization of patient  - Hold pressure on venipuncture sites to achieve adequate hemostasis  - Assess for signs and symptoms of internal bleeding  - Monitor lab trends  - Patient is to report abnormal signs of bleeding to staff  - Avoid use of toothpicks and dental floss  - Use electric shaver for shaving  - Use soft bristle tooth brush  - Limit straining and forceful nose blowing  Outcome: Progressing     Problem: MUSCULOSKELETAL - ADULT  Goal: Return mobility to safest level of function  Description: INTERVENTIONS:  - Assess patient stability and activity tolerance for standing, transferring and ambulating w/ or w/o assistive devices  - Assist with transfers and ambulation using safe patient handling equipment as needed  - Ensure adequate protection for wounds/incisions during mobilization  - Obtain PT/OT consults as needed  - Advance activity as appropriate  - Communicate ordered activity level and limitations with patient/family  Outcome: Progressing  Goal: Maintain proper alignment of affected body part  Description: INTERVENTIONS:  - Support and protect limb and body alignment per provider's orders  - Instruct and reinforce with patient and family use of appropriate assistive device and precautions (e.g. spinal or hip dislocation precautions)  Outcome: Progressing     Problem: NEUROLOGICAL - ADULT  Goal: Achieves stable or improved neurological status  Description: INTERVENTIONS  - Assess for and report changes in neurological status  - Initiate measures to prevent increased intracranial pressure  - Maintain blood pressure and fluid volume within ordered parameters to optimize cerebral perfusion and minimize risk of hemorrhage  - Monitor temperature, glucose, and sodium.  Initiate appropriate interventions as ordered  Outcome: Progressing  Goal: Absence of seizures  Description: INTERVENTIONS  - Monitor for seizure activity  - Administer anti-seizure medications as ordered  - Monitor neurological status  Outcome: Progressing  Goal: Remains free of injury related to seizure activity  Description: INTERVENTIONS:  - Maintain airway, patient safety  and administer oxygen as ordered  - Monitor patient for seizure activity, document and report duration and description of seizure to MD/LIP  - If seizure occurs, turn patient to side and suction secretions as needed  - Reorient patient post seizure  - Seizure pads on all 4 side rails  - Instruct patient/family to notify RN of any seizure activity  - Instruct patient/family to call for assistance with activity based on assessment  Outcome: Progressing  Goal: Achieves maximal functionality and self care  Description: INTERVENTIONS  - Monitor swallowing and airway patency with patient fatigue and changes in neurological status  - Encourage and assist patient to increase activity and self care with guidance from PT/OT  - Encourage visually impaired, hearing impaired and aphasic patients to use assistive/communication devices  Outcome: Progressing     Problem: Impaired Functional Mobility  Goal: Achieve highest/safest level of mobility/gait  Description: Interventions:  - Assess patient's functional ability and stability  - Promote increasing activity/tolerance for mobility and gait  - Educate and engage patient/family in tolerated activity level and precautions  - Recommend use of total lift for transfers  Outcome: Progressing     Problem: Impaired Activities of Daily Living  Goal: Achieve highest/safest level of independence in self care  Description: Interventions:  - Assess ability and encourage patient to participate in ADLs to maximize function  - Promote sitting position while performing ADLs such as feeding, grooming, and bathing  - Educate and encourage patient/family in tolerated functional activity level and precautions during self-care  - Encourage patient to incorporate impaired side during daily activities to promote function  Outcome: Progressing     Problem: Impaired Cognition  Goal: Patient will exhibit improved attention, thought processing and/or memory  Description: Interventions:  - Allow additional time for processing after asking questions or providing instructions  Outcome: Progressing

## 2022-04-07 NOTE — CM/SW NOTE
Pt's spouse Aidan Wagner notified  that staff at [facility] communicated that they do not accept patient's [employer]BS PP supplement plan for secondary insurance.  contacted liaison Sonia Yesenia ([phone]) who stated that facility accept's patient's ins, but patient's supplemental plan does not cover co-ins for [facility] after 20 days. Spouse Flavia notified and still agreeable to [address] for [facility] at [facility]. Aidan Wagner will contact / if alternate facility is being requested. 0130: Pt' spouse requesting neuropsych consult for patient during hospitalization, hospitalist and psych MD notified. Psych MD denying request since it's not appropriate at this time due patient's ongoing detox from ETOH. MD suggesting outpatient consult after dc to [facility]. Spouse Aidan Wagner made aware and agreeable. Plan: [facility] for [facility] pending medical clearance.     Raimundo Romero, BSN    [phone]

## 2022-04-07 NOTE — PLAN OF CARE
Vital signs stable, no complaints of pain. CIWA protocol remains in place, scores are low. Safety precaution in place, frequent rounding by nursing staff. Problem: Patient Centered Care  Goal: Patient preferences are identified and integrated in the patient's plan of care  Description: Interventions:  - What would you like us to know as we care for you? I live at home with my Wife. - Provide timely, complete, and accurate information to patient/family  - Incorporate patient and family knowledge, values, beliefs, and cultural backgrounds into the planning and delivery of care  - Encourage patient/family to participate in care and decision-making at the level they choose  - Honor patient and family perspectives and choices  Outcome: Progressing     Problem: Diabetes/Glucose Control  Goal: Glucose maintained within prescribed range  Description: INTERVENTIONS:  - Monitor Blood Glucose as ordered  - Assess for signs and symptoms of hyperglycemia and hypoglycemia  - Administer ordered medications to maintain glucose within target range  - Assess barriers to adequate nutritional intake and initiate nutrition consult as needed  - Instruct patient on self management of diabetes  Outcome: Progressing     Problem: Patient/Family Goals  Goal: Patient/Family Long Term Goal  Description: Patient's Long Term Goal: To stop drinking everyday    Interventions:  -Follow protocol for CIWA  -Administer alcohol withdrawal medications as needed   -Monitor for any acute mental changes  - See additional Care Plan goals for specific interventions  Outcome: Progressing  Goal: Patient/Family Short Term Goal  Description: Patient's Short Term Goal: To feel better, and go back with my wife.     Interventions:   - Administer medications as prescribed  -Monitor vital signs routinely  -Follow PT/OT recommendations  - See additional Care Plan goals for specific interventions  Outcome: Progressing     Problem: SAFETY ADULT - FALL  Goal: Free from fall injury  Description: INTERVENTIONS:  - Assess pt frequently for physical needs  - Identify cognitive and physical deficits and behaviors that affect risk of falls. - [place] fall precautions as indicated by assessment.  - Educate pt/family on patient safety including physical limitations  - Instruct pt to call for assistance with activity based on assessment  - Modify environment to reduce risk of injury  - Provide assistive devices as appropriate  - Consider OT/PT consult to assist with strengthening/mobility  - Encourage toileting schedule  Outcome: Progressing     Problem: CARDIOVASCULAR - ADULT  Goal: Maintains optimal cardiac output and hemodynamic stability  Description: INTERVENTIONS:  - Monitor vital signs, rhythm, and trends  - Monitor for bleeding, hypotension and signs of decreased cardiac output  - Evaluate effectiveness of vasoactive medications to optimize hemodynamic stability  - Monitor arterial and/or venous puncture sites for bleeding and/or hematoma  - Assess quality of pulses, skin color and temperature  - Assess for signs of decreased coronary artery perfusion - ex.  Angina  - Evaluate fluid balance, assess for edema, trend weights  Outcome: Progressing  Goal: Absence of cardiac arrhythmias or at baseline  Description: INTERVENTIONS:  - Continuous cardiac monitoring, monitor vital signs, obtain 12 lead EKG if indicated  - Evaluate effectiveness of antiarrhythmic and heart rate control medications as ordered  - Initiate emergency measures for life threatening arrhythmias  - Monitor electrolytes and administer replacement therapy as ordered  Outcome: Progressing     Problem: RESPIRATORY - ADULT  Goal: Achieves optimal ventilation and oxygenation  Description: INTERVENTIONS:  - Assess for changes in respiratory status  - Assess for changes in mentation and behavior  - Position to facilitate oxygenation and minimize respiratory effort  - Oxygen supplementation based on oxygen saturation or ABGs  - Provide Smoking Cessation handout, if applicable  - Encourage broncho-pulmonary hygiene including cough, deep breathe, Incentive Spirometry  - Assess the need for suctioning and perform as needed  - Assess and instruct to report SOB or any respiratory difficulty  - Respiratory Therapy support as indicated  - Manage/alleviate anxiety  - Monitor for signs/symptoms of CO2 retention  Outcome: Progressing     Problem: GASTROINTESTINAL - ADULT  Goal: Minimal or absence of nausea and vomiting  Description: INTERVENTIONS:  - Maintain adequate hydration with IV or PO as ordered and tolerated  - Nasogastric tube to low intermittent suction as ordered  - Evaluate effectiveness of ordered antiemetic medications  - Provide nonpharmacologic comfort measures as appropriate  - Advance diet as tolerated, if ordered  - Obtain nutritional consult as needed  - Evaluate fluid balance  Outcome: Progressing  Goal: Maintains or returns to baseline bowel function  Description: INTERVENTIONS:  - Assess bowel function  - Maintain adequate hydration with IV or PO as ordered and tolerated  - Evaluate effectiveness of GI medications  - Encourage mobilization and activity  - Obtain nutritional consult as needed  - Establish a toileting routine/schedule  - Consider collaborating with pharmacy to review patient's medication profile  Outcome: Progressing  Goal: Maintains adequate nutritional intake (undernourished)  Description: INTERVENTIONS:  - Monitor percentage of each meal consumed  - Identify factors contributing to decreased intake, treat as appropriate  - Assist with meals as needed  - Monitor I&O, WT and lab values  - Obtain nutritional consult as needed  - Optimize oral hygiene and moisture  - Encourage food from home; allow for food preferences  - Enhance eating environment  Outcome: Progressing     Problem: METABOLIC/FLUID AND ELECTROLYTES - ADULT  Goal: Glucose maintained within prescribed range  Description: INTERVENTIONS:  - Monitor Blood Glucose as ordered  - Assess for signs and symptoms of hyperglycemia and hypoglycemia  - Administer ordered medications to maintain glucose within target range  - Assess barriers to adequate nutritional intake and initiate nutrition consult as needed  - Instruct patient on self management of diabetes  Outcome: Progressing  Goal: Electrolytes maintained within normal limits  Description: INTERVENTIONS:  - Monitor labs and rhythm and assess patient for signs and symptoms of electrolyte imbalances  - Administer electrolyte replacement as ordered  - Monitor response to electrolyte replacements, including rhythm and repeat lab results as appropriate  - Fluid restriction as ordered  - Instruct patient on fluid and nutrition restrictions as appropriate  Outcome: Progressing  Goal: Hemodynamic stability and optimal renal function maintained  Description: INTERVENTIONS:  - Monitor labs and assess for signs and symptoms of volume excess or deficit  - Monitor intake, output and patient weight  - Monitor urine specific gravity, serum osmolarity and serum sodium as indicated or ordered  - Monitor response to interventions for patient's volume status, including labs, urine output, blood pressure (other measures as available)  - Encourage oral intake as appropriate  - Instruct patient on fluid and nutrition restrictions as appropriate  Outcome: Progressing     Problem: SKIN/TISSUE INTEGRITY - ADULT  Goal: Skin integrity remains intact  Description: INTERVENTIONS  - Assess and document risk factors for pressure ulcer development  - Assess and document skin integrity  - Monitor for areas of redness and/or skin breakdown  - Initiate interventions, skin care algorithm/standards of care as needed  Outcome: Progressing  Goal: Oral mucous membranes remain intact  Description: INTERVENTIONS  - Assess oral mucosa and hygiene practices  - Implement preventative oral hygiene regimen  - Implement oral medicated treatments as ordered  Outcome: Progressing     Problem: HEMATOLOGIC - ADULT  Goal: Maintains hematologic stability  Description: INTERVENTIONS  - Assess for signs and symptoms of bleeding or hemorrhage  - Monitor labs and vital signs for trends  - Administer supportive blood products/factors, fluids and medications as ordered and appropriate  - Administer supportive blood products/factors as ordered and appropriate  Outcome: Progressing  Goal: Free from bleeding injury  Description: (Example usage: patient with low platelets)  INTERVENTIONS:  - Avoid intramuscular injections, enemas and rectal medication administration  - Ensure safe mobilization of patient  - Hold pressure on venipuncture sites to achieve adequate hemostasis  - Assess for signs and symptoms of internal bleeding  - Monitor lab trends  - Patient is to report abnormal signs of bleeding to staff  - Avoid use of toothpicks and dental floss  - Use electric shaver for shaving  - Use soft bristle tooth brush  - Limit straining and forceful nose blowing  Outcome: Progressing     Problem: MUSCULOSKELETAL - ADULT  Goal: Return mobility to safest level of function  Description: INTERVENTIONS:  - Assess patient stability and activity tolerance for standing, transferring and ambulating w/ or w/o assistive devices  - Assist with transfers and ambulation using safe patient handling equipment as needed  - Ensure adequate protection for wounds/incisions during mobilization  - Obtain PT/OT consults as needed  - Advance activity as appropriate  - Communicate ordered activity level and limitations with patient/family  Outcome: Progressing  Goal: Maintain proper alignment of affected body part  Description: INTERVENTIONS:  - Support and protect limb and body alignment per provider's orders  - Instruct and reinforce with patient and family use of appropriate assistive device and precautions (e.g. spinal or hip dislocation precautions)  Outcome: Progressing     Problem: NEUROLOGICAL - ADULT  Goal: Achieves stable or improved neurological status  Description: INTERVENTIONS  - Assess for and report changes in neurological status  - Initiate measures to prevent increased intracranial pressure  - Maintain blood pressure and fluid volume within ordered parameters to optimize cerebral perfusion and minimize risk of hemorrhage  - Monitor temperature, glucose, and sodium.  Initiate appropriate interventions as ordered  Outcome: Progressing  Goal: Absence of seizures  Description: INTERVENTIONS  - Monitor for seizure activity  - Administer anti-seizure medications as ordered  - Monitor neurological status  Outcome: Progressing  Goal: Remains free of injury related to seizure activity  Description: INTERVENTIONS:  - Maintain airway, patient safety  and administer oxygen as ordered  - Monitor patient for seizure activity, document and report duration and description of seizure to MD/LIP  - If seizure occurs, turn patient to side and suction secretions as needed  - Reorient patient post seizure  - Seizure pads on all 4 side rails  - Instruct patient/family to notify RN of any seizure activity  - Instruct patient/family to call for assistance with activity based on assessment  Outcome: Progressing  Goal: Achieves maximal functionality and self care  Description: INTERVENTIONS  - Monitor swallowing and airway patency with patient fatigue and changes in neurological status  - Encourage and assist patient to increase activity and self care with guidance from PT/OT  - Encourage visually impaired, hearing impaired and aphasic patients to use assistive/communication devices  Outcome: Progressing     Problem: Impaired Functional Mobility  Goal: Achieve highest/safest level of mobility/gait  Description: Interventions:  - Assess patient's functional ability and stability  - Promote increasing activity/tolerance for mobility and gait  - Educate and engage patient/family in tolerated activity level and precautions  - Recommend use of total lift for transfers  Outcome: Progressing     Problem: Impaired Activities of Daily Living  Goal: Achieve highest/safest level of independence in self care  Description: Interventions:  - Assess ability and encourage patient to participate in ADLs to maximize function  - Promote sitting position while performing ADLs such as feeding, grooming, and bathing  - Educate and encourage patient/family in tolerated functional activity level and precautions during self-care  - Encourage patient to incorporate impaired side during daily activities to promote function  Outcome: Progressing     Problem: Impaired Cognition  Goal: Patient will exhibit improved attention, thought processing and/or memory  Description: Interventions:  - Allow additional time for processing after asking questions or providing instructions  Outcome: Progressing

## 2022-04-07 NOTE — PHYSICAL THERAPY NOTE
PHYSICAL THERAPY TREATMENT NOTE - INPATIENT     Room Number: 161/635-O       Presenting Problem: Alcohol withdrawl    Problem List  Principal Problem:    Alcohol withdrawal syndrome, with delirium ([facility] [address]  Active Problems:    Alcohol dependence, continuous (HCC)    Episodic mood disorder (HCC)      PHYSICAL THERAPY ASSESSMENT   Chart reviewed. SUZANNA Echeverria approved participation in physical therapy. PPE worn by therapist: mask and gloves. Patient was not wearing a mask during session. Patient presented in bed with 0/10 pain. Patient with fair  progress towards goals during this session. Education provided on Physical therapy plan of care and physiological benefits of out of bed mobility. Patient with fair carryover. Pt is received in the bed and was cleared for therapy session. Pt is min A with bed mobility and to transfer to the EOB. Pt sat EOB for a few minutes and denied any dizziness and light headedness. Pt is mod A with sit<>stand transfers with the RW. Pt required cueing for safety and correct hand placement. Pt was able to take a few steps with the RW mod A. Pt with slight ataxic gait and was unsteady. Pt required cueing to keep closer to the RW and RW management when turning. Pt with loss stool and required atif care. RN was present to assist. Pt was able to stand for a couple of minutes with min/mod A with standing balance. Returned pt back to sitting in the chair with all needs within reach and alarm system activated. Handed pt off to the RN. Bed mobility: Min assist  Transfers: Mod assist  Gait Assistance: Moderate assistance  Distance (ft): few steps to the chair  Assistive Device: Rolling walker  Pattern: Ataxic; Shuffle          . Patient was left in bedside chair and alarm activated at end of session with all needs in reach. The patient's Approx Degree of Impairment: 68.66% has been calculated based on documentation in the [facility] '6 clicks' Inpatient Basic Mobility Short Form.   Research supports that patients with this level of impairment may benefit from Subacute Rehab. RN aware of patient status post session. DISCHARGE RECOMMENDATIONS  PT Discharge Recommendations: Sub-acute rehabilitation     PLAN  PT Treatment Plan: Bed mobility; Body mechanics; Coordination;Patient education;Gait training;Strengthening;Transfer training;Balance training    SUBJECTIVE  Pt was agreeable to therapy session. OBJECTIVE  Precautions: Bed/chair alarm    WEIGHT BEARING RESTRICTION  Weight Bearing Restriction: None                PAIN ASSESSMENT   Ratin          BALANCE                                                                                                                       Static Sitting: Fair  Dynamic Sitting: Fair -           Static Standing: Poor  Dynamic Standing: Poor    ACTIVITY TOLERANCE                         O2 WALK       AM-PAC '6-Clicks' INPATIENT SHORT FORM - BASIC MOBILITY  How much difficulty does the patient currently have. .. Patient Difficulty: Turning over in bed (including adjusting bedclothes, sheets and blankets)?: A Little   Patient Difficulty: Sitting down on and standing up from a chair with arms (e.g., wheelchair, bedside commode, etc.): A Lot   Patient Difficulty: Moving from lying on back to sitting on the side of the bed?: A Lot   How much help from another person does the patient currently need. .. Help from Another: Moving to and from a bed to a chair (including a wheelchair)?: A Lot   Help from Another: Need to walk in hospital room?: A Lot   Help from Another: Climbing 3-5 steps with a railing?: Total     AM-PAC Score:  Raw Score: 12   Approx Degree of Impairment: 68.66%   Standardized Score (AM-PAC Scale): 35.33   CMS Modifier (G-Code): CL          Patient End of Session: Up in chair;Needs met;Call light within reach;RN aware of session/findings; All patient questions and concerns addressed; Alarm set    CURRENT GOALS   Goals to be met by: 22  Patient Goal Patient's self-stated goal is: did not state   Goal #1 Patient is able to demonstrate supine - sit EOB @ level: minimum assistance     Goal #1   Current Status Min A    Goal #2 Patient is able to demonstrate transfers Sit to/from Stand at assistance level: minimum assistance with walker - rolling     Goal #2  Current Status Mod A with the RW   Goal #3 Patient is able to ambulate 100 feet with assist device: walker - rolling at assistance level: minimum assistance   Goal #3   Current Status Few steps to the chair with the RW mod A   Goal #4 Stair goal TBD   Goal #4   Current Status    Goal #5 Patient to demonstrate independence with home activity/exercise instructions provided to patient in preparation for discharge.    Goal #5   Current Status IN PROGRESS   Goal #6    Goal #6  Current Status

## 2022-04-07 NOTE — PLAN OF CARE
CIWA q 2, PRN hydralazine given for HTN. Safety precaution in place, bed alarm and call light within reach. Will continue to monitor    Problem: Patient Centered Care  Goal: Patient preferences are identified and integrated in the patient's plan of care  Description: Interventions:  - What would you like us to know as we care for you? I live at home with my Wife. - Provide timely, complete, and accurate information to patient/family  - Incorporate patient and family knowledge, values, beliefs, and cultural backgrounds into the planning and delivery of care  - Encourage patient/family to participate in care and decision-making at the level they choose  - Honor patient and family perspectives and choices  Outcome: Progressing     Problem: Diabetes/Glucose Control  Goal: Glucose maintained within prescribed range  Description: INTERVENTIONS:  - Monitor Blood Glucose as ordered  - Assess for signs and symptoms of hyperglycemia and hypoglycemia  - Administer ordered medications to maintain glucose within target range  - Assess barriers to adequate nutritional intake and initiate nutrition consult as needed  - Instruct patient on self management of diabetes  Outcome: Progressing     Problem: Patient/Family Goals  Goal: Patient/Family Long Term Goal  Description: Patient's Long Term Goal: To stop drinking everyday    Interventions:  -Follow protocol for CIWA  -Administer alcohol withdrawal medications as needed   -Monitor for any acute mental changes  - See additional Care Plan goals for specific interventions  Outcome: Progressing  Goal: Patient/Family Short Term Goal  Description: Patient's Short Term Goal: To feel better, and go back with my wife.     Interventions:   - Administer medications as prescribed  -Monitor vital signs routinely  -Follow PT/OT recommendations  - See additional Care Plan goals for specific interventions  Outcome: Progressing     Problem: SAFETY ADULT - FALL  Goal: Free from fall injury  Description: INTERVENTIONS:  - Assess pt frequently for physical needs  - Identify cognitive and physical deficits and behaviors that affect risk of falls. - [place] fall precautions as indicated by assessment.  - Educate pt/family on patient safety including physical limitations  - Instruct pt to call for assistance with activity based on assessment  - Modify environment to reduce risk of injury  - Provide assistive devices as appropriate  - Consider OT/PT consult to assist with strengthening/mobility  - Encourage toileting schedule  Outcome: Progressing     Problem: NEUROLOGICAL - ADULT  Goal: Achieves stable or improved neurological status  Description: INTERVENTIONS  - Assess for and report changes in neurological status  - Initiate measures to prevent increased intracranial pressure  - Maintain blood pressure and fluid volume within ordered parameters to optimize cerebral perfusion and minimize risk of hemorrhage  - Monitor temperature, glucose, and sodium.  Initiate appropriate interventions as ordered  Outcome: Progressing  Goal: Absence of seizures  Description: INTERVENTIONS  - Monitor for seizure activity  - Administer anti-seizure medications as ordered  - Monitor neurological status  Outcome: Progressing  Goal: Remains free of injury related to seizure activity  Description: INTERVENTIONS:  - Maintain airway, patient safety  and administer oxygen as ordered  - Monitor patient for seizure activity, document and report duration and description of seizure to MD/LIP  - If seizure occurs, turn patient to side and suction secretions as needed  - Reorient patient post seizure  - Seizure pads on all 4 side rails  - Instruct patient/family to notify RN of any seizure activity  - Instruct patient/family to call for assistance with activity based on assessment  Outcome: Progressing  Goal: Achieves maximal functionality and self care  Description: INTERVENTIONS  - Monitor swallowing and airway patency with patient fatigue and changes in neurological status  - Encourage and assist patient to increase activity and self care with guidance from PT/OT  - Encourage visually impaired, hearing impaired and aphasic patients to use assistive/communication devices  Outcome: Progressing     Problem: Impaired Activities of Daily Living  Goal: Achieve highest/safest level of independence in self care  Description: Interventions:  - Assess ability and encourage patient to participate in ADLs to maximize function  - Promote sitting position while performing ADLs such as feeding, grooming, and bathing  - Educate and encourage patient/family in tolerated functional activity level and precautions during self-care    Outcome: Progressing     Problem: Impaired Cognition  Goal: Patient will exhibit improved attention, thought processing and/or memory  Description: Interventions:    Outcome: Progressing

## 2022-04-08 NOTE — PLAN OF CARE
Problem: Patient Centered Care  Goal: Patient preferences are identified and integrated in the patient's plan of care  Description: Interventions:  - What would you like us to know as we care for you? I live at home with my Wife. - Provide timely, complete, and accurate information to patient/family  - Incorporate patient and family knowledge, values, beliefs, and cultural backgrounds into the planning and delivery of care  - Encourage patient/family to participate in care and decision-making at the level they choose  - Honor patient and family perspectives and choices  Outcome: Progressing     Problem: Diabetes/Glucose Control  Goal: Glucose maintained within prescribed range  Description: INTERVENTIONS:  - Monitor Blood Glucose as ordered  - Assess for signs and symptoms of hyperglycemia and hypoglycemia  - Administer ordered medications to maintain glucose within target range  - Assess barriers to adequate nutritional intake and initiate nutrition consult as needed  - Instruct patient on self management of diabetes  Outcome: Progressing     Problem: SAFETY ADULT - FALL  Goal: Free from fall injury  Description: INTERVENTIONS:  - Assess pt frequently for physical needs  - Identify cognitive and physical deficits and behaviors that affect risk of falls.   - [place] fall precautions as indicated by assessment.  - Educate pt/family on patient safety including physical limitations  - Instruct pt to call for assistance with activity based on assessment  - Modify environment to reduce risk of injury  - Provide assistive devices as appropriate  - Consider OT/PT consult to assist with strengthening/mobility  - Encourage toileting schedule  Outcome: Progressing     Problem: CARDIOVASCULAR - ADULT  Goal: Maintains optimal cardiac output and hemodynamic stability  Description: INTERVENTIONS:  - Monitor vital signs, rhythm, and trends  - Monitor for bleeding, hypotension and signs of decreased cardiac output  - Evaluate effectiveness of vasoactive medications to optimize hemodynamic stability  - Monitor arterial and/or venous puncture sites for bleeding and/or hematoma  - Assess quality of pulses, skin color and temperature  - Assess for signs of decreased coronary artery perfusion - ex.  Angina  - Evaluate fluid balance, assess for edema, trend weights  Outcome: Progressing     Problem: RESPIRATORY - ADULT  Goal: Achieves optimal ventilation and oxygenation  Description: INTERVENTIONS:  - Assess for changes in respiratory status  - Assess for changes in mentation and behavior  - Position to facilitate oxygenation and minimize respiratory effort  - Oxygen supplementation based on oxygen saturation or ABGs  - Provide Smoking Cessation handout, if applicable  - Encourage broncho-pulmonary hygiene including cough, deep breathe, Incentive Spirometry  - Assess the need for suctioning and perform as needed  - Assess and instruct to report SOB or any respiratory difficulty  - Respiratory Therapy support as indicated  - Manage/alleviate anxiety  - Monitor for signs/symptoms of CO2 retention  Outcome: Progressing     Problem: GASTROINTESTINAL - ADULT  Goal: Minimal or absence of nausea and vomiting  Description: INTERVENTIONS:  - Maintain adequate hydration with IV or PO as ordered and tolerated  - Nasogastric tube to low intermittent suction as ordered  - Evaluate effectiveness of ordered antiemetic medications  - Provide nonpharmacologic comfort measures as appropriate  - Advance diet as tolerated, if ordered  - Obtain nutritional consult as needed  - Evaluate fluid balance  Outcome: Progressing     Problem: SKIN/TISSUE INTEGRITY - ADULT  Goal: Skin integrity remains intact  Description: INTERVENTIONS  - Assess and document risk factors for pressure ulcer development  - Assess and document skin integrity  - Monitor for areas of redness and/or skin breakdown  - Initiate interventions, skin care algorithm/standards of care as needed  Outcome: Progressing     Problem: HEMATOLOGIC - ADULT  Goal: Maintains hematologic stability  Description: INTERVENTIONS  - Assess for signs and symptoms of bleeding or hemorrhage  - Monitor labs and vital signs for trends  - Administer supportive blood products/factors, fluids and medications as ordered and appropriate  - Administer supportive blood products/factors as ordered and appropriate  Outcome: Progressing     Problem: METABOLIC/FLUID AND ELECTROLYTES - ADULT  Goal: Glucose maintained within prescribed range  Description: INTERVENTIONS:  - Monitor Blood Glucose as ordered  - Assess for signs and symptoms of hyperglycemia and hypoglycemia  - Administer ordered medications to maintain glucose within target range  - Assess barriers to adequate nutritional intake and initiate nutrition consult as needed  - Instruct patient on self management of diabetes  Outcome: Progressing     Problem: NEUROLOGICAL - ADULT  Goal: Achieves stable or improved neurological status  Description: INTERVENTIONS  - Assess for and report changes in neurological status  - Initiate measures to prevent increased intracranial pressure  - Maintain blood pressure and fluid volume within ordered parameters to optimize cerebral perfusion and minimize risk of hemorrhage  - Monitor temperature, glucose, and sodium. Initiate appropriate interventions as ordered  Outcome: Progressing   Pt is alert x2, forgetful, confused at times. CIWA protocol maintained, medicated per MAR, pt with mild tremors, & restlessness. Bed alarm on. Call light within reach. Intentional rounding maintained.

## 2022-04-08 NOTE — CM/SW NOTE
Patient stable for discharge. Notified [facility]. Arranged [facility] ([phone]) for 630pm, PCS complete. Confirmed plan with Marcia BRISENO & wife Lester Skinner.     SUZANNA to report [address] [place] [address], [address] [place] [address]

## 2022-04-09 NOTE — PLAN OF CARE
Problem: Patient Centered Care  Goal: Patient preferences are identified and integrated in the patient's plan of care  Description: Interventions:  - What would you like us to know as we care for you? I live at home with my Wife. - Provide timely, complete, and accurate information to patient/family  - Incorporate patient and family knowledge, values, beliefs, and cultural backgrounds into the planning and delivery of care  - Encourage patient/family to participate in care and decision-making at the level they choose  - Honor patient and family perspectives and choices  4/9/2022 1309 by Phuong Johnson RN  Outcome: Completed  4/9/2022 1028 by Phuong Johnson RN  Outcome: Progressing     Problem: Diabetes/Glucose Control  Goal: Glucose maintained within prescribed range  Description: INTERVENTIONS:  - Monitor Blood Glucose as ordered  - Assess for signs and symptoms of hyperglycemia and hypoglycemia  - Administer ordered medications to maintain glucose within target range  - Assess barriers to adequate nutritional intake and initiate nutrition consult as needed  - Instruct patient on self management of diabetes  4/9/2022 1309 by Phuong Johnson RN  Outcome: Completed  4/9/2022 1028 by Phuong Johnson RN  Outcome: Progressing     Problem: Patient/Family Goals  Goal: Patient/Family Long Term Goal  Description: Patient's Long Term Goal: To stop drinking everyday    Interventions:  -Follow protocol for CIWA  -Administer alcohol withdrawal medications as needed   -Monitor for any acute mental changes  - See additional Care Plan goals for specific interventions  4/9/2022 1309 by Phuong Johnson RN  Outcome: Completed  4/9/2022 1028 by Phuong Johnson RN  Outcome: Progressing  Goal: Patient/Family Short Term Goal  Description: Patient's Short Term Goal: To feel better, and go back with my wife.     Interventions:   - Administer medications as prescribed  -Monitor vital signs routinely  -Follow PT/OT recommendations  - See additional Care Plan goals for specific interventions  4/9/2022 1309 by Amador Hansen RN  Outcome: Completed  4/9/2022 1028 by Amador Hansen RN  Outcome: Progressing     Problem: SAFETY ADULT - FALL  Goal: Free from fall injury  Description: INTERVENTIONS:  - Assess pt frequently for physical needs  - Identify cognitive and physical deficits and behaviors that affect risk of falls. - [place] fall precautions as indicated by assessment.  - Educate pt/family on patient safety including physical limitations  - Instruct pt to call for assistance with activity based on assessment  - Modify environment to reduce risk of injury  - Provide assistive devices as appropriate  - Consider OT/PT consult to assist with strengthening/mobility  - Encourage toileting schedule  4/9/2022 1309 by Amador Hansen RN  Outcome: Completed  4/9/2022 1028 by Amador Hansen RN  Outcome: Progressing     Problem: CARDIOVASCULAR - ADULT  Goal: Maintains optimal cardiac output and hemodynamic stability  Description: INTERVENTIONS:  - Monitor vital signs, rhythm, and trends  - Monitor for bleeding, hypotension and signs of decreased cardiac output  - Evaluate effectiveness of vasoactive medications to optimize hemodynamic stability  - Monitor arterial and/or venous puncture sites for bleeding and/or hematoma  - Assess quality of pulses, skin color and temperature  - Assess for signs of decreased coronary artery perfusion - ex.  Angina  - Evaluate fluid balance, assess for edema, trend weights  4/9/2022 1309 by Amador Hansen RN  Outcome: Completed  4/9/2022 1028 by Amador Hansen RN  Outcome: Progressing  Goal: Absence of cardiac arrhythmias or at baseline  Description: INTERVENTIONS:  - Continuous cardiac monitoring, monitor vital signs, obtain 12 lead EKG if indicated  - Evaluate effectiveness of antiarrhythmic and heart rate control medications as ordered  - Initiate emergency measures for life threatening arrhythmias  - Monitor electrolytes and administer replacement therapy as ordered  4/9/2022 1309 by Jeaneth Villatoro RN  Outcome: Completed  4/9/2022 1028 by Jeaneth Villatoro RN  Outcome: Progressing     Problem: RESPIRATORY - ADULT  Goal: Achieves optimal ventilation and oxygenation  Description: INTERVENTIONS:  - Assess for changes in respiratory status  - Assess for changes in mentation and behavior  - Position to facilitate oxygenation and minimize respiratory effort  - Oxygen supplementation based on oxygen saturation or ABGs  - Provide Smoking Cessation handout, if applicable  - Encourage broncho-pulmonary hygiene including cough, deep breathe, Incentive Spirometry  - Assess the need for suctioning and perform as needed  - Assess and instruct to report SOB or any respiratory difficulty  - Respiratory Therapy support as indicated  - Manage/alleviate anxiety  - Monitor for signs/symptoms of CO2 retention  4/9/2022 1309 by Jeaneth Villatoro RN  Outcome: Completed  4/9/2022 1028 by Jeaneth Villatoro RN  Outcome: Progressing     Problem: GASTROINTESTINAL - ADULT  Goal: Minimal or absence of nausea and vomiting  Description: INTERVENTIONS:  - Maintain adequate hydration with IV or PO as ordered and tolerated  - Nasogastric tube to low intermittent suction as ordered  - Evaluate effectiveness of ordered antiemetic medications  - Provide nonpharmacologic comfort measures as appropriate  - Advance diet as tolerated, if ordered  - Obtain nutritional consult as needed  - Evaluate fluid balance  4/9/2022 1309 by Jeaneth Villatoro RN  Outcome: Completed  4/9/2022 1028 by Jeaneth Villatoro RN  Outcome: Progressing  Goal: Maintains or returns to baseline bowel function  Description: INTERVENTIONS:  - Assess bowel function  - Maintain adequate hydration with IV or PO as ordered and tolerated  - Evaluate effectiveness of GI medications  - Encourage mobilization and activity  - Obtain nutritional consult as needed  - Establish a toileting routine/schedule  - Consider collaborating with pharmacy to review patient's medication profile  4/9/2022 1309 by Kesha Saul RN  Outcome: Completed  4/9/2022 1028 by Kesha Saul RN  Outcome: Progressing  Goal: Maintains adequate nutritional intake (undernourished)  Description: INTERVENTIONS:  - Monitor percentage of each meal consumed  - Identify factors contributing to decreased intake, treat as appropriate  - Assist with meals as needed  - Monitor I&O, WT and lab values  - Obtain nutritional consult as needed  - Optimize oral hygiene and moisture  - Encourage food from home; allow for food preferences  - Enhance eating environment  4/9/2022 1309 by Kesha Saul RN  Outcome: Completed  4/9/2022 1028 by Kesha Saul RN  Outcome: Progressing     Problem: METABOLIC/FLUID AND ELECTROLYTES - ADULT  Goal: Glucose maintained within prescribed range  Description: INTERVENTIONS:  - Monitor Blood Glucose as ordered  - Assess for signs and symptoms of hyperglycemia and hypoglycemia  - Administer ordered medications to maintain glucose within target range  - Assess barriers to adequate nutritional intake and initiate nutrition consult as needed  - Instruct patient on self management of diabetes  4/9/2022 1309 by Kesha Saul RN  Outcome: Completed  4/9/2022 1028 by Kesha Saul RN  Outcome: Progressing  Goal: Electrolytes maintained within normal limits  Description: INTERVENTIONS:  - Monitor labs and rhythm and assess patient for signs and symptoms of electrolyte imbalances  - Administer electrolyte replacement as ordered  - Monitor response to electrolyte replacements, including rhythm and repeat lab results as appropriate  - Fluid restriction as ordered  - Instruct patient on fluid and nutrition restrictions as appropriate  4/9/2022 1309 by Lizette Reynoso RN  Outcome: Completed  4/9/2022 1028 by Lizette Reynoso RN  Outcome: Progressing  Goal: Hemodynamic stability and optimal renal function maintained  Description: INTERVENTIONS:  - Monitor labs and assess for signs and symptoms of volume excess or deficit  - Monitor intake, output and patient weight  - Monitor urine specific gravity, serum osmolarity and serum sodium as indicated or ordered  - Monitor response to interventions for patient's volume status, including labs, urine output, blood pressure (other measures as available)  - Encourage oral intake as appropriate  - Instruct patient on fluid and nutrition restrictions as appropriate  4/9/2022 1309 by Lizette Reynoso RN  Outcome: Completed  4/9/2022 1028 by Lizette Reynoso RN  Outcome: Progressing     Problem: SKIN/TISSUE INTEGRITY - ADULT  Goal: Skin integrity remains intact  Description: INTERVENTIONS  - Assess and document risk factors for pressure ulcer development  - Assess and document skin integrity  - Monitor for areas of redness and/or skin breakdown  - Initiate interventions, skin care algorithm/standards of care as needed  4/9/2022 1309 by Lizette Reynoso RN  Outcome: Completed  4/9/2022 1028 by Lizette Reynoso RN  Outcome: Progressing  Goal: Oral mucous membranes remain intact  Description: INTERVENTIONS  - Assess oral mucosa and hygiene practices  - Implement preventative oral hygiene regimen  - Implement oral medicated treatments as ordered  4/9/2022 1309 by Lizette Reynoso RN  Outcome: Completed  4/9/2022 1028 by Lizette eRynoso RN  Outcome: Progressing     Problem: HEMATOLOGIC - ADULT  Goal: Maintains hematologic stability  Description: INTERVENTIONS  - Assess for signs and symptoms of bleeding or hemorrhage  - Monitor labs and vital signs for trends  - Administer supportive blood products/factors, fluids and medications as ordered and appropriate  - Administer supportive blood products/factors as ordered and appropriate  4/9/2022 1309 by Kesha Saul RN  Outcome: Completed  4/9/2022 1028 by Kesha Saul RN  Outcome: Progressing  Goal: Free from bleeding injury  Description: (Example usage: patient with low platelets)  INTERVENTIONS:  - Avoid intramuscular injections, enemas and rectal medication administration  - Ensure safe mobilization of patient  - Hold pressure on venipuncture sites to achieve adequate hemostasis  - Assess for signs and symptoms of internal bleeding  - Monitor lab trends  - Patient is to report abnormal signs of bleeding to staff  - Avoid use of toothpicks and dental floss  - Use electric shaver for shaving  - Use soft bristle tooth brush  - Limit straining and forceful nose blowing  4/9/2022 1309 by Kesha Saul RN  Outcome: Completed  4/9/2022 1028 by Kesha Saul RN  Outcome: Progressing     Problem: MUSCULOSKELETAL - ADULT  Goal: Return mobility to safest level of function  Description: INTERVENTIONS:  - Assess patient stability and activity tolerance for standing, transferring and ambulating w/ or w/o assistive devices  - Assist with transfers and ambulation using safe patient handling equipment as needed  - Ensure adequate protection for wounds/incisions during mobilization  - Obtain PT/OT consults as needed  - Advance activity as appropriate  - Communicate ordered activity level and limitations with patient/family  4/9/2022 1309 by Kesha Saul RN  Outcome: Completed  4/9/2022 1028 by Kesha Saul RN  Outcome: Progressing  Goal: Maintain proper alignment of affected body part  Description: INTERVENTIONS:  - Support and protect limb and body alignment per provider's orders  - Instruct and reinforce with patient and family use of appropriate assistive device and precautions (e.g. spinal or hip dislocation precautions)  4/9/2022 1309 by Phuong Johnson RN  Outcome: Completed  4/9/2022 1028 by Phuong Johnson RN  Outcome: Progressing     Problem: NEUROLOGICAL - ADULT  Goal: Achieves stable or improved neurological status  Description: INTERVENTIONS  - Assess for and report changes in neurological status  - Initiate measures to prevent increased intracranial pressure  - Maintain blood pressure and fluid volume within ordered parameters to optimize cerebral perfusion and minimize risk of hemorrhage  - Monitor temperature, glucose, and sodium.  Initiate appropriate interventions as ordered  4/9/2022 1309 by Phuong Johnson RN  Outcome: Completed  4/9/2022 1028 by Phuong Johnson RN  Outcome: Progressing  Goal: Absence of seizures  Description: INTERVENTIONS  - Monitor for seizure activity  - Administer anti-seizure medications as ordered  - Monitor neurological status  4/9/2022 1309 by Phuong Johnson RN  Outcome: Completed  4/9/2022 1028 by Phuong Johnson RN  Outcome: Progressing  Goal: Remains free of injury related to seizure activity  Description: INTERVENTIONS:  - Maintain airway, patient safety  and administer oxygen as ordered  - Monitor patient for seizure activity, document and report duration and description of seizure to MD/LIP  - If seizure occurs, turn patient to side and suction secretions as needed  - Reorient patient post seizure  - Seizure pads on all 4 side rails  - Instruct patient/family to notify RN of any seizure activity  - Instruct patient/family to call for assistance with activity based on assessment  4/9/2022 1309 by Phuong Johnson RN  Outcome: Completed  4/9/2022 1028 by Phuong Johnson RN  Outcome: Progressing  Goal: Achieves maximal functionality and self care  Description: INTERVENTIONS  - Monitor swallowing and airway patency with patient fatigue and changes in neurological status  - Encourage and assist patient to increase activity and self care with guidance from PT/OT  - Encourage visually impaired, hearing impaired and aphasic patients to use assistive/communication devices  4/9/2022 1309 by Marleny Castañeda RN  Outcome: Completed  4/9/2022 1028 by Marleny Castañeda RN  Outcome: Progressing     Problem: Impaired Functional Mobility  Goal: Achieve highest/safest level of mobility/gait  Description: Interventions:  - Assess patient's functional ability and stability  - Promote increasing activity/tolerance for mobility and gait  - Educate and engage patient/family in tolerated activity level and precautions    4/9/2022 1309 by Marleny Castañeda RN  Outcome: Completed  4/9/2022 1028 by Marleny Castañeda RN  Outcome: Progressing     Problem: Impaired Activities of Daily Living  Goal: Achieve highest/safest level of independence in self care  Description: Interventions:  - Assess ability and encourage patient to participate in ADLs to maximize function  - Promote sitting position while performing ADLs such as feeding, grooming, and bathing  - Educate and encourage patient/family in tolerated functional activity level and precautions during self-care  {/2022 1309 by Marleny Castañeda RN  Outcome: Completed  4/9/2022 1028 by Marleny Castañeda RN  Outcome: Progressing     Problem: Impaired Cognition  Goal: Patient will exhibit improved attention, thought processing and/or memory  Description: Interventions:    4/9/2022 1309 by Marleny Castañeda RN  Outcome: Completed  4/9/2022 1028 by Marleny Castañeda RN  Outcome: Progressing

## 2022-04-09 NOTE — PLAN OF CARE
Problem: Patient Centered Care  Goal: Patient preferences are identified and integrated in the patient's plan of care  Description: Interventions:  - What would you like us to know as we care for you? I live at home with my Wife. - Provide timely, complete, and accurate information to patient/family  - Incorporate patient and family knowledge, values, beliefs, and cultural backgrounds into the planning and delivery of care  - Encourage patient/family to participate in care and decision-making at the level they choose  - Honor patient and family perspectives and choices  Outcome: Progressing     Problem: Diabetes/Glucose Control  Goal: Glucose maintained within prescribed range  Description: INTERVENTIONS:  - Monitor Blood Glucose as ordered  - Assess for signs and symptoms of hyperglycemia and hypoglycemia  - Administer ordered medications to maintain glucose within target range  - Assess barriers to adequate nutritional intake and initiate nutrition consult as needed  - Instruct patient on self management of diabetes  Outcome: Progressing     Problem: SAFETY ADULT - FALL  Goal: Free from fall injury  Description: INTERVENTIONS:  - Assess pt frequently for physical needs  - Identify cognitive and physical deficits and behaviors that affect risk of falls.   - [place] fall precautions as indicated by assessment.  - Educate pt/family on patient safety including physical limitations  - Instruct pt to call for assistance with activity based on assessment  - Modify environment to reduce risk of injury  - Provide assistive devices as appropriate  - Consider OT/PT consult to assist with strengthening/mobility  - Encourage toileting schedule  Outcome: Progressing     Problem: CARDIOVASCULAR - ADULT  Goal: Maintains optimal cardiac output and hemodynamic stability  Description: INTERVENTIONS:  - Monitor vital signs, rhythm, and trends  - Monitor for bleeding, hypotension and signs of decreased cardiac output  - Evaluate effectiveness of vasoactive medications to optimize hemodynamic stability  - Monitor arterial and/or venous puncture sites for bleeding and/or hematoma  - Assess quality of pulses, skin color and temperature  - Assess for signs of decreased coronary artery perfusion - ex.  Angina  - Evaluate fluid balance, assess for edema, trend weights  Outcome: Progressing     Problem: CARDIOVASCULAR - ADULT  Goal: Absence of cardiac arrhythmias or at baseline  Description: INTERVENTIONS:  - Continuous cardiac monitoring, monitor vital signs, obtain 12 lead EKG if indicated  - Evaluate effectiveness of antiarrhythmic and heart rate control medications as ordered  - Initiate emergency measures for life threatening arrhythmias  - Monitor electrolytes and administer replacement therapy as ordered  Outcome: Progressing     Problem: RESPIRATORY - ADULT  Goal: Achieves optimal ventilation and oxygenation  Description: INTERVENTIONS:  - Assess for changes in respiratory status  - Assess for changes in mentation and behavior  - Position to facilitate oxygenation and minimize respiratory effort  - Oxygen supplementation based on oxygen saturation or ABGs  - Provide Smoking Cessation handout, if applicable  - Encourage broncho-pulmonary hygiene including cough, deep breathe, Incentive Spirometry  - Assess the need for suctioning and perform as needed  - Assess and instruct to report SOB or any respiratory difficulty  - Respiratory Therapy support as indicated  - Manage/alleviate anxiety  - Monitor for signs/symptoms of CO2 retention  Outcome: Progressing     Problem: GASTROINTESTINAL - ADULT  Goal: Minimal or absence of nausea and vomiting  Description: INTERVENTIONS:  - Maintain adequate hydration with IV or PO as ordered and tolerated  - Nasogastric tube to low intermittent suction as ordered  - Evaluate effectiveness of ordered antiemetic medications  - Provide nonpharmacologic comfort measures as appropriate  - Advance diet as tolerated, if ordered  - Obtain nutritional consult as needed  - Evaluate fluid balance  Outcome: Progressing     Problem: GASTROINTESTINAL - ADULT  Goal: Maintains or returns to baseline bowel function  Description: INTERVENTIONS:  - Assess bowel function  - Maintain adequate hydration with IV or PO as ordered and tolerated  - Evaluate effectiveness of GI medications  - Encourage mobilization and activity  - Obtain nutritional consult as needed  - Establish a toileting routine/schedule  - Consider collaborating with pharmacy to review patient's medication profile  Outcome: Progressing     Problem: GASTROINTESTINAL - ADULT  Goal: Maintains adequate nutritional intake (undernourished)  Description: INTERVENTIONS:  - Monitor percentage of each meal consumed  - Identify factors contributing to decreased intake, treat as appropriate  - Assist with meals as needed  - Monitor I&O, WT and lab values  - Obtain nutritional consult as needed  - Optimize oral hygiene and moisture  - Encourage food from home; allow for food preferences  - Enhance eating environment  Outcome: Progressing     Problem: METABOLIC/FLUID AND ELECTROLYTES - ADULT  Goal: Glucose maintained within prescribed range  Description: INTERVENTIONS:  - Monitor Blood Glucose as ordered  - Assess for signs and symptoms of hyperglycemia and hypoglycemia  - Administer ordered medications to maintain glucose within target range  - Assess barriers to adequate nutritional intake and initiate nutrition consult as needed  - Instruct patient on self management of diabetes  Outcome: Progressing     Problem: METABOLIC/FLUID AND ELECTROLYTES - ADULT  Goal: Electrolytes maintained within normal limits  Description: INTERVENTIONS:  - Monitor labs and rhythm and assess patient for signs and symptoms of electrolyte imbalances  - Administer electrolyte replacement as ordered  - Monitor response to electrolyte replacements, including rhythm and repeat lab results as appropriate  - Fluid restriction as ordered  - Instruct patient on fluid and nutrition restrictions as appropriate  Outcome: Progressing     Problem: METABOLIC/FLUID AND ELECTROLYTES - ADULT  Goal: Hemodynamic stability and optimal renal function maintained  Description: INTERVENTIONS:  - Monitor labs and assess for signs and symptoms of volume excess or deficit  - Monitor intake, output and patient weight  - Monitor urine specific gravity, serum osmolarity and serum sodium as indicated or ordered  - Monitor response to interventions for patient's volume status, including labs, urine output, blood pressure (other measures as available)  - Encourage oral intake as appropriate  - Instruct patient on fluid and nutrition restrictions as appropriate  Outcome: Progressing     Problem: SKIN/TISSUE INTEGRITY - ADULT  Goal: Skin integrity remains intact  Description: INTERVENTIONS  - Assess and document risk factors for pressure ulcer development  - Assess and document skin integrity  - Monitor for areas of redness and/or skin breakdown  - Initiate interventions, skin care algorithm/standards of care as needed  Outcome: Progressing     Problem: SKIN/TISSUE INTEGRITY - ADULT  Goal: Oral mucous membranes remain intact  Description: INTERVENTIONS  - Assess oral mucosa and hygiene practices  - Implement preventative oral hygiene regimen  - Implement oral medicated treatments as ordered  Outcome: Progressing     Problem: HEMATOLOGIC - ADULT  Goal: Maintains hematologic stability  Description: INTERVENTIONS  - Assess for signs and symptoms of bleeding or hemorrhage  - Monitor labs and vital signs for trends  - Administer supportive blood products/factors, fluids and medications as ordered and appropriate  - Administer supportive blood products/factors as ordered and appropriate  Outcome: Progressing     Problem: HEMATOLOGIC - ADULT  Goal: Free from bleeding injury  Description: (Example usage: patient with low platelets)  INTERVENTIONS:  - Avoid intramuscular injections, enemas and rectal medication administration  - Ensure safe mobilization of patient  - Hold pressure on venipuncture sites to achieve adequate hemostasis  - Assess for signs and symptoms of internal bleeding  - Monitor lab trends  - Patient is to report abnormal signs of bleeding to staff  - Avoid use of toothpicks and dental floss  - Use electric shaver for shaving  - Use soft bristle tooth brush  - Limit straining and forceful nose blowing  Outcome: Progressing     Problem: MUSCULOSKELETAL - ADULT  Goal: Return mobility to safest level of function  Description: INTERVENTIONS:  - Assess patient stability and activity tolerance for standing, transferring and ambulating w/ or w/o assistive devices  - Assist with transfers and ambulation using safe patient handling equipment as needed  - Ensure adequate protection for wounds/incisions during mobilization  - Obtain PT/OT consults as needed  - Advance activity as appropriate  - Communicate ordered activity level and limitations with patient/family  Outcome: Progressing     Problem: MUSCULOSKELETAL - ADULT  Goal: Maintain proper alignment of affected body part  Description: INTERVENTIONS:  - Support and protect limb and body alignment per provider's orders  - Instruct and reinforce with patient and family use of appropriate assistive device and precautions (e.g. spinal or hip dislocation precautions)  Outcome: Progressing     Problem: NEUROLOGICAL - ADULT  Goal: Absence of seizures  Description: INTERVENTIONS  - Monitor for seizure activity  - Administer anti-seizure medications as ordered  - Monitor neurological status  Outcome: Progressing     Problem: NEUROLOGICAL - ADULT  Goal: Remains free of injury related to seizure activity  Description: INTERVENTIONS:  - Maintain airway, patient safety  and administer oxygen as ordered  - Monitor patient for seizure activity, document and report duration and description of seizure to MD/LIP  - If seizure occurs, turn patient to side and suction secretions as needed  - Reorient patient post seizure  - Seizure pads on all 4 side rails  - Instruct patient/family to notify RN of any seizure activity  - Instruct patient/family to call for assistance with activity based on assessment  Outcome: Progressing     Problem: NEUROLOGICAL - ADULT  Goal: Achieves maximal functionality and self care  Description: INTERVENTIONS  - Monitor swallowing and airway patency with patient fatigue and changes in neurological status  - Encourage and assist patient to increase activity and self care with guidance from PT/OT  - Encourage visually impaired, hearing impaired and aphasic patients to use assistive/communication devices  Outcome: Progressing   Pt is alert x2, can be confused and forgetful at times. Discharge was cancelled yesterday due to pt refusing to go to [address]. MD will revaluate this morning. Pt switched to PO antibiotic. BP was high, MD notified, orders received and carried out. Bed alarm on. Intentional rounding maintained.

## 2022-04-09 NOTE — PLAN OF CARE
Problem: Patient Centered Care  Goal: Patient preferences are identified and integrated in the patient's plan of care  Description: Interventions:  - What would you like us to know as we care for you? I live at home with my Wife. - Provide timely, complete, and accurate information to patient/family  - Incorporate patient and family knowledge, values, beliefs, and cultural backgrounds into the planning and delivery of care  - Encourage patient/family to participate in care and decision-making at the level they choose  - Honor patient and family perspectives and choices  Outcome: Progressing     Problem: Diabetes/Glucose Control  Goal: Glucose maintained within prescribed range  Description: INTERVENTIONS:  - Monitor Blood Glucose as ordered  - Assess for signs and symptoms of hyperglycemia and hypoglycemia  - Administer ordered medications to maintain glucose within target range  - Assess barriers to adequate nutritional intake and initiate nutrition consult as needed  - Instruct patient on self management of diabetes  Outcome: Progressing     Problem: Patient/Family Goals  Goal: Patient/Family Long Term Goal  Description: Patient's Long Term Goal: To stop drinking everyday    Interventions:  -Follow protocol for CIWA  -Administer alcohol withdrawal medications as needed   -Monitor for any acute mental changes  - See additional Care Plan goals for specific interventions  Outcome: Progressing  Goal: Patient/Family Short Term Goal  Description: Patient's Short Term Goal: To feel better, and go back with my wife.     Interventions:   - Administer medications as prescribed  -Monitor vital signs routinely  -Follow PT/OT recommendations  - See additional Care Plan goals for specific interventions  Outcome: Progressing     Problem: SAFETY ADULT - FALL  Goal: Free from fall injury  Description: INTERVENTIONS:  - Assess pt frequently for physical needs  - Identify cognitive and physical deficits and behaviors that affect risk of falls. - [place] fall precautions as indicated by assessment.  - Educate pt/family on patient safety including physical limitations  - Instruct pt to call for assistance with activity based on assessment  - Modify environment to reduce risk of injury  - Provide assistive devices as appropriate  - Consider OT/PT consult to assist with strengthening/mobility  - Encourage toileting schedule  Outcome: Progressing     Problem: CARDIOVASCULAR - ADULT  Goal: Maintains optimal cardiac output and hemodynamic stability  Description: INTERVENTIONS:  - Monitor vital signs, rhythm, and trends  - Monitor for bleeding, hypotension and signs of decreased cardiac output  - Evaluate effectiveness of vasoactive medications to optimize hemodynamic stability  - Monitor arterial and/or venous puncture sites for bleeding and/or hematoma  - Assess quality of pulses, skin color and temperature  - Assess for signs of decreased coronary artery perfusion - ex.  Angina  - Evaluate fluid balance, assess for edema, trend weights  Outcome: Progressing  Goal: Absence of cardiac arrhythmias or at baseline  Description: INTERVENTIONS:  - Continuous cardiac monitoring, monitor vital signs, obtain 12 lead EKG if indicated  - Evaluate effectiveness of antiarrhythmic and heart rate control medications as ordered  - Initiate emergency measures for life threatening arrhythmias  - Monitor electrolytes and administer replacement therapy as ordered  Outcome: Progressing     Problem: RESPIRATORY - ADULT  Goal: Achieves optimal ventilation and oxygenation  Description: INTERVENTIONS:  - Assess for changes in respiratory status  - Assess for changes in mentation and behavior  - Position to facilitate oxygenation and minimize respiratory effort  - Oxygen supplementation based on oxygen saturation or ABGs  - Provide Smoking Cessation handout, if applicable  - Encourage broncho-pulmonary hygiene including cough, deep breathe, Incentive Spirometry  - Assess the need for suctioning and perform as needed  - Assess and instruct to report SOB or any respiratory difficulty  - Respiratory Therapy support as indicated  - Manage/alleviate anxiety  - Monitor for signs/symptoms of CO2 retention  Outcome: Progressing     Problem: GASTROINTESTINAL - ADULT  Goal: Minimal or absence of nausea and vomiting  Description: INTERVENTIONS:  - Maintain adequate hydration with IV or PO as ordered and tolerated  - Nasogastric tube to low intermittent suction as ordered  - Evaluate effectiveness of ordered antiemetic medications  - Provide nonpharmacologic comfort measures as appropriate  - Advance diet as tolerated, if ordered  - Obtain nutritional consult as needed  - Evaluate fluid balance  Outcome: Progressing  Goal: Maintains or returns to baseline bowel function  Description: INTERVENTIONS:  - Assess bowel function  - Maintain adequate hydration with IV or PO as ordered and tolerated  - Evaluate effectiveness of GI medications  - Encourage mobilization and activity  - Obtain nutritional consult as needed  - Establish a toileting routine/schedule  - Consider collaborating with pharmacy to review patient's medication profile  Outcome: Progressing  Goal: Maintains adequate nutritional intake (undernourished)  Description: INTERVENTIONS:  - Monitor percentage of each meal consumed  - Identify factors contributing to decreased intake, treat as appropriate  - Assist with meals as needed  - Monitor I&O, WT and lab values  - Obtain nutritional consult as needed  - Optimize oral hygiene and moisture  - Encourage food from home; allow for food preferences  - Enhance eating environment  Outcome: Progressing     Problem: METABOLIC/FLUID AND ELECTROLYTES - ADULT  Goal: Glucose maintained within prescribed range  Description: INTERVENTIONS:  - Monitor Blood Glucose as ordered  - Assess for signs and symptoms of hyperglycemia and hypoglycemia  - Administer ordered medications to maintain glucose within target range  - Assess barriers to adequate nutritional intake and initiate nutrition consult as needed  - Instruct patient on self management of diabetes  Outcome: Progressing  Goal: Electrolytes maintained within normal limits  Description: INTERVENTIONS:  - Monitor labs and rhythm and assess patient for signs and symptoms of electrolyte imbalances  - Administer electrolyte replacement as ordered  - Monitor response to electrolyte replacements, including rhythm and repeat lab results as appropriate  - Fluid restriction as ordered  - Instruct patient on fluid and nutrition restrictions as appropriate  Outcome: Progressing  Goal: Hemodynamic stability and optimal renal function maintained  Description: INTERVENTIONS:  - Monitor labs and assess for signs and symptoms of volume excess or deficit  - Monitor intake, output and patient weight  - Monitor urine specific gravity, serum osmolarity and serum sodium as indicated or ordered  - Monitor response to interventions for patient's volume status, including labs, urine output, blood pressure (other measures as available)  - Encourage oral intake as appropriate  - Instruct patient on fluid and nutrition restrictions as appropriate  Outcome: Progressing     Problem: SKIN/TISSUE INTEGRITY - ADULT  Goal: Skin integrity remains intact  Description: INTERVENTIONS  - Assess and document risk factors for pressure ulcer development  - Assess and document skin integrity  - Monitor for areas of redness and/or skin breakdown  - Initiate interventions, skin care algorithm/standards of care as needed  Outcome: Progressing  Goal: Oral mucous membranes remain intact  Description: INTERVENTIONS  - Assess oral mucosa and hygiene practices  - Implement preventative oral hygiene regimen  - Implement oral medicated treatments as ordered  Outcome: Progressing     Problem: HEMATOLOGIC - ADULT  Goal: Maintains hematologic stability  Description: INTERVENTIONS  - Assess for signs and symptoms of bleeding or hemorrhage  - Monitor labs and vital signs for trends  - Administer supportive blood products/factors, fluids and medications as ordered and appropriate  - Administer supportive blood products/factors as ordered and appropriate  Outcome: Progressing  Goal: Free from bleeding injury  Description: (Example usage: patient with low platelets)  INTERVENTIONS:  - Avoid intramuscular injections, enemas and rectal medication administration  - Ensure safe mobilization of patient  - Hold pressure on venipuncture sites to achieve adequate hemostasis  - Assess for signs and symptoms of internal bleeding  - Monitor lab trends  - Patient is to report abnormal signs of bleeding to staff  - Avoid use of toothpicks and dental floss  - Use electric shaver for shaving  - Use soft bristle tooth brush  - Limit straining and forceful nose blowing  Outcome: Progressing     Problem: MUSCULOSKELETAL - ADULT  Goal: Return mobility to safest level of function  Description: INTERVENTIONS:  - Assess patient stability and activity tolerance for standing, transferring and ambulating w/ or w/o assistive devices  - Assist with transfers and ambulation using safe patient handling equipment as needed  - Ensure adequate protection for wounds/incisions during mobilization  - Obtain PT/OT consults as needed  - Advance activity as appropriate  - Communicate ordered activity level and limitations with patient/family  Outcome: Progressing  Goal: Maintain proper alignment of affected body part  Description: INTERVENTIONS:  - Support and protect limb and body alignment per provider's orders  - Instruct and reinforce with patient and family use of appropriate assistive device and precautions (e.g. spinal or hip dislocation precautions)  Outcome: Progressing     Problem: NEUROLOGICAL - ADULT  Goal: Achieves stable or improved neurological status  Description: INTERVENTIONS  - Assess for and report changes in neurological status  - Initiate measures to prevent increased intracranial pressure  - Maintain blood pressure and fluid volume within ordered parameters to optimize cerebral perfusion and minimize risk of hemorrhage  - Monitor temperature, glucose, and sodium.  Initiate appropriate interventions as ordered  Outcome: Progressing  Goal: Absence of seizures  Description: INTERVENTIONS  - Monitor for seizure activity  - Administer anti-seizure medications as ordered  - Monitor neurological status  Outcome: Progressing  Goal: Remains free of injury related to seizure activity  Description: INTERVENTIONS:  - Maintain airway, patient safety  and administer oxygen as ordered  - Monitor patient for seizure activity, document and report duration and description of seizure to MD/LIP  - If seizure occurs, turn patient to side and suction secretions as needed  - Reorient patient post seizure  - Seizure pads on all 4 side rails  - Instruct patient/family to notify RN of any seizure activity  - Instruct patient/family to call for assistance with activity based on assessment  Outcome: Progressing  Goal: Achieves maximal functionality and self care  Description: INTERVENTIONS  - Monitor swallowing and airway patency with patient fatigue and changes in neurological status  - Encourage and assist patient to increase activity and self care with guidance from PT/OT  - Encourage visually impaired, hearing impaired and aphasic patients to use assistive/communication devices  Outcome: Progressing     Problem: Impaired Functional Mobility  Goal: Achieve highest/safest level of mobility/gait  Description: Interventions:  - Assess patient's functional ability and stability  - Promote increasing activity/tolerance for mobility and gait  - Educate and engage patient/family in tolerated activity level and precautions    Outcome: Progressing     Problem: Impaired Activities of Daily Living  Goal: Achieve highest/safest level of independence in self care  Description: Interventions:  - Assess ability and encourage patient to participate in ADLs to maximize function  - Promote sitting position while performing ADLs such as feeding, grooming, and bathing  - Educate and encourage patient/family in tolerated functional activity level and precautions during self-care   Progressing     Problem: Impaired Cognition  Goal: Patient will exhibit improved attention, thought processing and/or memory  Description: Interventions:  {utcome: Progressing 4/9/22 discharge to rehab today

## 2022-04-09 NOTE — PROGRESS NOTES
Report gven to [facility]. Ambulance here, discharged via ambulance with belongingsd. No c/o oviced.

## 2022-04-09 NOTE — CM/SW NOTE
SW following for discharge planning. MDO received and present for discharge. SW discussed discharge w/ MD. MD reports that patient is medically cleared for DC to [facility] and plan was to DC yesterday 4/8 but patient refused DC at time of DC when transport was present. MD reports that patient is able to make decision for discharge and MD reports that recommended plan is [facility]. SW contacted patient to confirm discharge plan. SW discussed w/ patient [facility] and discharge plan that was arranged for DC to [address] [facility]. Patient reports that he is agreeable to DC to [facility] at [address]. SW notified patient that DC will be today once coordinated w/ facility. Patient agreeable to plan. SW sent message to [facility] via [employer]. SW contacted [facility] to coordinate DC. SW was notified by  that typically admissions do not occur on the weekend. SW requested to speak to covering admissions or manager on duty.  reports that there is no covering admissions or manager on duty for weekend. SW requested to speak to . SW provided contact information and requested return call to coordinate DC. SW notified that patient was planned to admit on 4/8 and is now cleared for DC today 4/9. Updated MD and RN. SW received message from [facility] via [employer] who confirmed that patient can admit to facility today. Coordinated DC w/ liaison. Plan: [address]  Phone: [phone]  Ambulance arranged via "[employer] for 1 PM. PCS complete. MD, RN, patient informed. SHELLY/TAY to remain available for support and/or discharge planning.      ESTER Hauser, [facility]   [id number]

## 2022-04-09 NOTE — PLAN OF CARE
CIWA's completed, <6. CIWA's are discontinued. Patient medically cleared to go to [facility]. Some patient belongings sent home with wife. IV removed. Will be calling Eliezer SAN to provide report. Frequent rounding completed. Fall precautions in place. Call light within reach. Bed alarm on    Problem: Patient Centered Care  Goal: Patient preferences are identified and integrated in the patient's plan of care  Description: Interventions:  - What would you like us to know as we care for you? I live at home with my Wife. - Provide timely, complete, and accurate information to patient/family  - Incorporate patient and family knowledge, values, beliefs, and cultural backgrounds into the planning and delivery of care  - Encourage patient/family to participate in care and decision-making at the level they choose  - Honor patient and family perspectives and choices  Outcome: Progressing     Problem: Patient Centered Care  Goal: Patient preferences are identified and integrated in the patient's plan of care  Description: Interventions:  - What would you like us to know as we care for you? I live at home with my Wife.   - Provide timely, complete, and accurate information to patient/family  - Incorporate patient and family knowledge, values, beliefs, and cultural backgrounds into the planning and delivery of care  - Encourage patient/family to participate in care and decision-making at the level they choose  - Honor patient and family perspectives and choices  Outcome: Progressing     Problem: Diabetes/Glucose Control  Goal: Glucose maintained within prescribed range  Description: INTERVENTIONS:  - Monitor Blood Glucose as ordered  - Assess for signs and symptoms of hyperglycemia and hypoglycemia  - Administer ordered medications to maintain glucose within target range  - Assess barriers to adequate nutritional intake and initiate nutrition consult as needed  - Instruct patient on self management of diabetes  Outcome: Progressing     Problem: Patient/Family Goals  Goal: Patient/Family Long Term Goal  Description: Patient's Long Term Goal: To stop drinking everyday    Interventions:  -Follow protocol for CIWA  -Administer alcohol withdrawal medications as needed   -Monitor for any acute mental changes  - See additional Care Plan goals for specific interventions  Outcome: Progressing  Goal: Patient/Family Short Term Goal  Description: Patient's Short Term Goal: To feel better, and go back with my wife. Interventions:   - Administer medications as prescribed  -Monitor vital signs routinely  -Follow PT/OT recommendations  - See additional Care Plan goals for specific interventions  Outcome: Progressing     Problem: SAFETY ADULT - FALL  Goal: Free from fall injury  Description: INTERVENTIONS:  - Assess pt frequently for physical needs  - Identify cognitive and physical deficits and behaviors that affect risk of falls. - [place] fall precautions as indicated by assessment.  - Educate pt/family on patient safety including physical limitations  - Instruct pt to call for assistance with activity based on assessment  - Modify environment to reduce risk of injury  - Provide assistive devices as appropriate  - Consider OT/PT consult to assist with strengthening/mobility  - Encourage toileting schedule  Outcome: Progressing     Problem: CARDIOVASCULAR - ADULT  Goal: Maintains optimal cardiac output and hemodynamic stability  Description: INTERVENTIONS:  - Monitor vital signs, rhythm, and trends  - Monitor for bleeding, hypotension and signs of decreased cardiac output  - Evaluate effectiveness of vasoactive medications to optimize hemodynamic stability  - Monitor arterial and/or venous puncture sites for bleeding and/or hematoma  - Assess quality of pulses, skin color and temperature  - Assess for signs of decreased coronary artery perfusion - ex.  Angina  - Evaluate fluid balance, assess for edema, trend weights  Outcome: Progressing  Goal: Absence of cardiac arrhythmias or at baseline  Description: INTERVENTIONS:  - Continuous cardiac monitoring, monitor vital signs, obtain 12 lead EKG if indicated  - Evaluate effectiveness of antiarrhythmic and heart rate control medications as ordered  - Initiate emergency measures for life threatening arrhythmias  - Monitor electrolytes and administer replacement therapy as ordered  Outcome: Progressing     Problem: RESPIRATORY - ADULT  Goal: Achieves optimal ventilation and oxygenation  Description: INTERVENTIONS:  - Assess for changes in respiratory status  - Assess for changes in mentation and behavior  - Position to facilitate oxygenation and minimize respiratory effort  - Oxygen supplementation based on oxygen saturation or ABGs  - Provide Smoking Cessation handout, if applicable  - Encourage broncho-pulmonary hygiene including cough, deep breathe, Incentive Spirometry  - Assess the need for suctioning and perform as needed  - Assess and instruct to report SOB or any respiratory difficulty  - Respiratory Therapy support as indicated  - Manage/alleviate anxiety  - Monitor for signs/symptoms of CO2 retention  Outcome: Progressing     Problem: GASTROINTESTINAL - ADULT  Goal: Minimal or absence of nausea and vomiting  Description: INTERVENTIONS:  - Maintain adequate hydration with IV or PO as ordered and tolerated  - Nasogastric tube to low intermittent suction as ordered  - Evaluate effectiveness of ordered antiemetic medications  - Provide nonpharmacologic comfort measures as appropriate  - Advance diet as tolerated, if ordered  - Obtain nutritional consult as needed  - Evaluate fluid balance  Outcome: Progressing  Goal: Maintains or returns to baseline bowel function  Description: INTERVENTIONS:  - Assess bowel function  - Maintain adequate hydration with IV or PO as ordered and tolerated  - Evaluate effectiveness of GI medications  - Encourage mobilization and activity  - Obtain nutritional consult as needed  - Establish a toileting routine/schedule  - Consider collaborating with pharmacy to review patient's medication profile  Outcome: Progressing  Goal: Maintains adequate nutritional intake (undernourished)  Description: INTERVENTIONS:  - Monitor percentage of each meal consumed  - Identify factors contributing to decreased intake, treat as appropriate  - Assist with meals as needed  - Monitor I&O, WT and lab values  - Obtain nutritional consult as needed  - Optimize oral hygiene and moisture  - Encourage food from home; allow for food preferences  - Enhance eating environment  Outcome: Progressing     Problem: METABOLIC/FLUID AND ELECTROLYTES - ADULT  Goal: Glucose maintained within prescribed range  Description: INTERVENTIONS:  - Monitor Blood Glucose as ordered  - Assess for signs and symptoms of hyperglycemia and hypoglycemia  - Administer ordered medications to maintain glucose within target range  - Assess barriers to adequate nutritional intake and initiate nutrition consult as needed  - Instruct patient on self management of diabetes  Outcome: Progressing  Goal: Electrolytes maintained within normal limits  Description: INTERVENTIONS:  - Monitor labs and rhythm and assess patient for signs and symptoms of electrolyte imbalances  - Administer electrolyte replacement as ordered  - Monitor response to electrolyte replacements, including rhythm and repeat lab results as appropriate  - Fluid restriction as ordered  - Instruct patient on fluid and nutrition restrictions as appropriate  Outcome: Progressing  Goal: Hemodynamic stability and optimal renal function maintained  Description: INTERVENTIONS:  - Monitor labs and assess for signs and symptoms of volume excess or deficit  - Monitor intake, output and patient weight  - Monitor urine specific gravity, serum osmolarity and serum sodium as indicated or ordered  - Monitor response to interventions for patient's volume status, including labs, urine output, blood pressure (other measures as available)  - Encourage oral intake as appropriate  - Instruct patient on fluid and nutrition restrictions as appropriate  Outcome: Progressing     Problem: SKIN/TISSUE INTEGRITY - ADULT  Goal: Skin integrity remains intact  Description: INTERVENTIONS  - Assess and document risk factors for pressure ulcer development  - Assess and document skin integrity  - Monitor for areas of redness and/or skin breakdown  - Initiate interventions, skin care algorithm/standards of care as needed  Outcome: Progressing  Goal: Oral mucous membranes remain intact  Description: INTERVENTIONS  - Assess oral mucosa and hygiene practices  - Implement preventative oral hygiene regimen  - Implement oral medicated treatments as ordered  Outcome: Progressing     Problem: HEMATOLOGIC - ADULT  Goal: Maintains hematologic stability  Description: INTERVENTIONS  - Assess for signs and symptoms of bleeding or hemorrhage  - Monitor labs and vital signs for trends  - Administer supportive blood products/factors, fluids and medications as ordered and appropriate  - Administer supportive blood products/factors as ordered and appropriate  Outcome: Progressing  Goal: Free from bleeding injury  Description: (Example usage: patient with low platelets)  INTERVENTIONS:  - Avoid intramuscular injections, enemas and rectal medication administration  - Ensure safe mobilization of patient  - Hold pressure on venipuncture sites to achieve adequate hemostasis  - Assess for signs and symptoms of internal bleeding  - Monitor lab trends  - Patient is to report abnormal signs of bleeding to staff  - Avoid use of toothpicks and dental floss  - Use electric shaver for shaving  - Use soft bristle tooth brush  - Limit straining and forceful nose blowing  Outcome: Progressing     Problem: MUSCULOSKELETAL - ADULT  Goal: Return mobility to safest level of function  Description: INTERVENTIONS:  - Assess patient stability and activity tolerance for standing, transferring and ambulating w/ or w/o assistive devices  - Assist with transfers and ambulation using safe patient handling equipment as needed  - Ensure adequate protection for wounds/incisions during mobilization  - Obtain PT/OT consults as needed  - Advance activity as appropriate  - Communicate ordered activity level and limitations with patient/family  Outcome: Progressing  Goal: Maintain proper alignment of affected body part  Description: INTERVENTIONS:  - Support and protect limb and body alignment per provider's orders  - Instruct and reinforce with patient and family use of appropriate assistive device and precautions (e.g. spinal or hip dislocation precautions)  Outcome: Progressing     Problem: NEUROLOGICAL - ADULT  Goal: Achieves stable or improved neurological status  Description: INTERVENTIONS  - Assess for and report changes in neurological status  - Initiate measures to prevent increased intracranial pressure  - Maintain blood pressure and fluid volume within ordered parameters to optimize cerebral perfusion and minimize risk of hemorrhage  - Monitor temperature, glucose, and sodium.  Initiate appropriate interventions as ordered  Outcome: Progressing  Goal: Absence of seizures  Description: INTERVENTIONS  - Monitor for seizure activity  - Administer anti-seizure medications as ordered  - Monitor neurological status  Outcome: Progressing  Goal: Remains free of injury related to seizure activity  Description: INTERVENTIONS:  - Maintain airway, patient safety  and administer oxygen as ordered  - Monitor patient for seizure activity, document and report duration and description of seizure to MD/LIP  - If seizure occurs, turn patient to side and suction secretions as needed  - Reorient patient post seizure  - Seizure pads on all 4 side rails  - Instruct patient/family to notify RN of any seizure activity  - Instruct patient/family to call for assistance with activity based on assessment  Outcome: Progressing  Goal: Achieves maximal functionality and self care  Description: INTERVENTIONS  - Monitor swallowing and airway patency with patient fatigue and changes in neurological status  - Encourage and assist patient to increase activity and self care with guidance from PT/OT  - Encourage visually impaired, hearing impaired and aphasic patients to use assistive/communication devices  Outcome: Progressing     Problem: Impaired Functional Mobility  Goal: Achieve highest/safest level of mobility/gait  Description: Interventions:  - Assess patient's functional ability and stability  - Promote increasing activity/tolerance for mobility and gait  - Educate and engage patient/family in tolerated activity level and precautions    Outcome: Progressing     Problem: Impaired Activities of Daily Living  Goal: Achieve highest/safest level of independence in self care  Description: Interventions:  - Assess ability and encourage patient to participate in ADLs to maximize function  - Promote sitting position while performing ADLs such as feeding, grooming, and bathing  - Educate and encourage patient/family in tolerated functional activity level and precautions during self-care    Outcome: Progressing     Problem: Impaired Cognition  Goal: Patient will exhibit improved attention, thought processing and/or memory  Description: Interventions:    Outcome: Progressing

## 2022-04-09 NOTE — PROGRESS NOTES
Pt's Wife notified by phone  that pt going to rehab at [facility] today.   Discharged via ambulance personnel to [facility]

## 2022-04-25 NOTE — TELEPHONE ENCOUNTER
Per EMR pt was admitted. To reception staff, pls call pt for hosp f/u appt. Thanks.      Future Appointments   Date Time Provider Jenna Conway   7/13/2022  9:45 AM Mary Schmidt [facility]

## 2022-05-02 ENCOUNTER — TELEPHONE (OUTPATIENT)
Dept: FAMILY MEDICINE CLINIC | Facility: CLINIC | Age: 77
End: 2022-05-02

## 2022-05-02 NOTE — TELEPHONE ENCOUNTER
Per wife (POA)  , she is  the patient and per her 's advice to contact DR Milli Jones to revoke her  POA status  for his medical needs, that includes all the record from the hospital .   She would like to get a letter as well stating that she is no longer the POA for the patient.

## 2022-05-02 NOTE — TELEPHONE ENCOUNTER
I went ahead and wrote a letter for his wife Lina Gibbons. I did not send it through my chart as it would go to her 's MyChart. Ask her how she would like to receive this letter.

## 2022-05-03 NOTE — TELEPHONE ENCOUNTER
Wife returning phone call. Provided message below. Wife requesting letter to be mailed to her home address. Wife on TIMBO and requested letter to be read to her over the phone. Letter read and wife agreed with scripting.

## 2022-05-03 NOTE — TELEPHONE ENCOUNTER
Called Flavia,left message to call back and inform of message below. Letter ready for  at [facility] .

## 2022-05-05 ENCOUNTER — APPOINTMENT (OUTPATIENT)
Dept: CT IMAGING | Facility: HOSPITAL | Age: 77
End: 2022-05-05
Attending: EMERGENCY MEDICINE
Payer: MEDICARE

## 2022-05-05 PROCEDURE — 70450 CT HEAD/BRAIN W/O DYE: CPT | Performed by: EMERGENCY MEDICINE

## 2022-05-05 NOTE — ED QUICK NOTES
Spoke with pt's wife, Glenny Stoner, via telephone regarding pt's care. Glenny Stoner states that since pt's last hospitalization that pt has decreased in grooming and ADL's Pt has been in detox x4. Glenny Stoner also states that pt has had difficulty emptying his bladder x1 year and has been non complaint with cathing himself.

## 2022-05-05 NOTE — ED INITIAL ASSESSMENT (HPI)
Pt arrives from home via EMS for intermittent AMS x several months. Per EMS, pt is A&Ox2/4, which is not per pt's baseline. Pt has a hx of alcohol dependency and has +ETOH today per EMS.

## 2022-05-12 ENCOUNTER — APPOINTMENT (OUTPATIENT)
Dept: CT IMAGING | Facility: HOSPITAL | Age: 77
End: 2022-05-12
Attending: STUDENT IN AN ORGANIZED HEALTH CARE EDUCATION/TRAINING PROGRAM
Payer: MEDICARE

## 2022-05-12 ENCOUNTER — APPOINTMENT (OUTPATIENT)
Dept: GENERAL RADIOLOGY | Facility: HOSPITAL | Age: 77
End: 2022-05-12
Payer: MEDICARE

## 2022-05-12 PROCEDURE — 70450 CT HEAD/BRAIN W/O DYE: CPT | Performed by: STUDENT IN AN ORGANIZED HEALTH CARE EDUCATION/TRAINING PROGRAM

## 2022-05-12 NOTE — ED INITIAL ASSESSMENT (HPI)
Pt to ED via EMS c/o syncopal episode, denies head or neck pain at this time. Pt states he was standing and then found himself on the floor.

## 2022-05-13 NOTE — ED QUICK NOTES
Was able to contact pt's wife, informed that a ride was arranged for pt and he will be coming home soon.

## 2022-05-13 NOTE — ED QUICK NOTES
Pt's wife not comfortable picking pt up. Spoke with MD, plan is to allow pt to become sober and arrange a ride home. Attempted to call pt wife to update her with plan, no answer. Will attempt to call again later.

## 2022-05-13 NOTE — CM/SW NOTE
CM assistance requested by Rozina Stern, RN to arrange a ride home for the patient. Patient's wife is unable to pick him up. Lyft ride was requested to the verified address(by [place]) [address] [place] [address]. ETA 5 min. CM notified Rozina Stern RN.    , [address] [place] [address]  License plate  [id number]

## 2022-05-25 ENCOUNTER — TELEPHONE (OUTPATIENT)
Dept: SURGERY | Facility: CLINIC | Age: 77
End: 2022-05-25

## 2022-06-06 ENCOUNTER — TELEPHONE (OUTPATIENT)
Dept: FAMILY MEDICINE CLINIC | Facility: CLINIC | Age: 77
End: 2022-06-06

## 2022-06-14 ENCOUNTER — OFFICE VISIT (OUTPATIENT)
Dept: FAMILY MEDICINE CLINIC | Facility: CLINIC | Age: 77
End: 2022-06-14
Payer: MEDICARE

## 2022-06-14 ENCOUNTER — LAB ENCOUNTER (OUTPATIENT)
Dept: LAB | Age: 77
End: 2022-06-14
Attending: FAMILY MEDICINE
Payer: MEDICARE

## 2022-06-14 VITALS
SYSTOLIC BLOOD PRESSURE: 133 MMHG | HEART RATE: 109 BPM | HEIGHT: 70 IN | DIASTOLIC BLOOD PRESSURE: 77 MMHG | TEMPERATURE: 97 F | BODY MASS INDEX: 20.33 KG/M2 | WEIGHT: 142 LBS

## 2022-06-14 DIAGNOSIS — R73.9 HYPERGLYCEMIA: Primary | ICD-10-CM

## 2022-06-14 DIAGNOSIS — F10.20 ALCOHOL DEPENDENCE, CONTINUOUS (HCC): ICD-10-CM

## 2022-06-14 DIAGNOSIS — R73.9 HYPERGLYCEMIA: ICD-10-CM

## 2022-06-14 DIAGNOSIS — F32.A DEPRESSIVE DISORDER: ICD-10-CM

## 2022-06-14 DIAGNOSIS — F10.20 ALCOHOLIC (HCC): ICD-10-CM

## 2022-06-14 DIAGNOSIS — E46 PROTEIN-CALORIE MALNUTRITION, UNSPECIFIED SEVERITY (HCC): ICD-10-CM

## 2022-06-14 LAB
EST. AVERAGE GLUCOSE BLD GHB EST-MCNC: 137 MG/DL (ref 68–126)
HBA1C MFR BLD: 6.4 % (ref ?–5.7)

## 2022-06-14 PROCEDURE — 36415 COLL VENOUS BLD VENIPUNCTURE: CPT

## 2022-06-14 PROCEDURE — 83036 HEMOGLOBIN GLYCOSYLATED A1C: CPT

## 2022-06-14 PROCEDURE — 99214 OFFICE O/P EST MOD 30 MIN: CPT | Performed by: FAMILY MEDICINE

## 2022-06-15 PROBLEM — F10.20 ETOH DEPENDENCE (HCC): Status: ACTIVE | Noted: 2022-06-15

## 2022-06-20 ENCOUNTER — NURSE TRIAGE (OUTPATIENT)
Dept: INTERNAL MEDICINE CLINIC | Facility: CLINIC | Age: 77
End: 2022-06-20

## 2022-06-20 NOTE — TELEPHONE ENCOUNTER
Spoke with Pt's spouse and informed her of Dr. Kerstin Collet message. Spouse verbalized understanding and states Pt will be the one performing catheterization. Advised to take precautions as needed if she will be assisting Pt. Pt verbalized understanding.

## 2022-06-20 NOTE — TELEPHONE ENCOUNTER
Spouse Flavia(on hipaa) indicated that patient has been at [facility] currently admitted. There for the fifth time. Patient is there for detox from alcohol. Patient is refusing to stop drinking. Patient is going to be discharged today at 1pm, but spouse is concerned because patient was diagnosed with MRSA and being treated with an antibiotic. Patient also catheterizes. Spouse concerned if patient is considered contagious since has MRSA and if ok for him to be discharged? Also wife was crying alittle on the phone. Asked spouse if she had support. Stated that she speaks to her sister and also has a therapist. Has tried [facility] in the past but did not feel it was helpful. Spouse does not feel she can leave the house or be away from patient since she does not feel the patient can take care of himself and can not self catheterize himself. Wanted to get doctor's recommendations. Did not feel patient needed a hospital follow up appointment at this time. Advised spouse to follow up with her therapist to take care of herself as well. Spouse agreed.

## 2022-06-20 NOTE — TELEPHONE ENCOUNTER
If he is on the antibiotic for MRSA, she should be fine but she should wear gloves and wash her hands thoroughly if she is the one that is going to be catheterizing him.

## 2022-06-22 NOTE — TELEPHONE ENCOUNTER
I received a call from pt and he stated that he was concern about coming in since he has MRSA in his urine. Pt also felt that his appt should be cancelled and wanted a appt for next week. Pt was informed that he did not have a appt it had been cancelled. He then wanted me to talk to his wife. Wife stated that his appt was a f/u as he was at [facility]. A video appt was made for tomorrow.      Future Appointments   Date Time Provider Jenna Conway   6/23/2022  1:30 PM DO DONNA Regalado [facility]   6/28/2022  4:00 PM Rosemary Lees [facility] BHANU Malgorzata

## 2022-06-23 ENCOUNTER — TELEMEDICINE (OUTPATIENT)
Dept: FAMILY MEDICINE CLINIC | Facility: CLINIC | Age: 77
End: 2022-06-23

## 2022-06-23 DIAGNOSIS — N40.1 BENIGN PROSTATIC HYPERPLASIA WITH LOWER URINARY TRACT SYMPTOMS, SYMPTOM DETAILS UNSPECIFIED: ICD-10-CM

## 2022-06-23 DIAGNOSIS — F10.20 ALCOHOLIC (HCC): ICD-10-CM

## 2022-06-23 DIAGNOSIS — N31.2 ATONIC URINARY BLADDER: ICD-10-CM

## 2022-06-23 DIAGNOSIS — Z78.9 INTERMITTENT SELF-CATHETERIZATION OF BLADDER: ICD-10-CM

## 2022-06-23 DIAGNOSIS — N30.90 CYSTITIS: Primary | ICD-10-CM

## 2022-06-23 PROCEDURE — 99214 OFFICE O/P EST MOD 30 MIN: CPT | Performed by: FAMILY MEDICINE

## 2022-06-23 PROCEDURE — 1111F DSCHRG MED/CURRENT MED MERGE: CPT | Performed by: FAMILY MEDICINE

## 2022-06-28 RX ORDER — FOLIC ACID 1 MG/1
1 TABLET ORAL DAILY
Qty: 90 TABLET | Refills: 1 | Status: SHIPPED | OUTPATIENT
Start: 2022-06-28

## 2022-06-28 NOTE — TELEPHONE ENCOUNTER
Please review refill protocol failed/ no protocol  Requested Prescriptions   Pending Prescriptions Disp Refills    FOLIC ACID 1 MG Oral Tab [Pharmacy Med Name: Folic Acid 1 Mg Tab Amne] 30 tablet 0     Sig: TAKE ONE TABLET BY MOUTH ONE TIME DAILY        There is no refill protocol information for this order

## 2022-10-11 ENCOUNTER — APPOINTMENT (OUTPATIENT)
Dept: MRI IMAGING | Facility: HOSPITAL | Age: 77
End: 2022-10-11
Attending: EMERGENCY MEDICINE
Payer: MEDICARE

## 2022-10-11 ENCOUNTER — NURSE TRIAGE (OUTPATIENT)
Dept: FAMILY MEDICINE CLINIC | Facility: CLINIC | Age: 77
End: 2022-10-11

## 2022-10-11 ENCOUNTER — HOSPITAL ENCOUNTER (EMERGENCY)
Facility: HOSPITAL | Age: 77
Discharge: HOME | End: 2022-10-11
Attending: EMERGENCY MEDICINE
Payer: MEDICARE

## 2022-10-11 VITALS
RESPIRATION RATE: 18 BRPM | HEIGHT: 70 IN | HEART RATE: 72 BPM | WEIGHT: 169.75 LBS | SYSTOLIC BLOOD PRESSURE: 140 MMHG | DIASTOLIC BLOOD PRESSURE: 88 MMHG | OXYGEN SATURATION: 99 % | TEMPERATURE: 98 F | BODY MASS INDEX: 24.3 KG/M2

## 2022-10-11 DIAGNOSIS — R29.898 MUSCULAR DECONDITIONING: ICD-10-CM

## 2022-10-11 DIAGNOSIS — S76.012A STRAIN OF FLEXOR MUSCLE OF LEFT HIP, INITIAL ENCOUNTER: Primary | ICD-10-CM

## 2022-10-11 LAB
ALBUMIN SERPL-MCNC: 3.9 G/DL (ref 3.4–5)
ALP LIVER SERPL-CCNC: 76 U/L
ALT SERPL-CCNC: 46 U/L
ANION GAP SERPL CALC-SCNC: 10 MMOL/L (ref 0–18)
AST SERPL-CCNC: 40 U/L (ref 15–37)
BASOPHILS # BLD AUTO: 0.03 X10(3) UL (ref 0–0.2)
BASOPHILS NFR BLD AUTO: 0.4 %
BILIRUB DIRECT SERPL-MCNC: 0.4 MG/DL (ref 0–0.2)
BILIRUB SERPL-MCNC: 1.1 MG/DL (ref 0.1–2)
BUN BLD-MCNC: 31 MG/DL (ref 7–18)
BUN/CREAT SERPL: 24 (ref 10–20)
CALCIUM BLD-MCNC: 9.3 MG/DL (ref 8.5–10.1)
CHLORIDE SERPL-SCNC: 97 MMOL/L (ref 98–112)
CO2 SERPL-SCNC: 26 MMOL/L (ref 21–32)
CREAT BLD-MCNC: 1.29 MG/DL
DEPRECATED RDW RBC AUTO: 44.8 FL (ref 35.1–46.3)
EOSINOPHIL # BLD AUTO: 0.01 X10(3) UL (ref 0–0.7)
EOSINOPHIL NFR BLD AUTO: 0.1 %
ERYTHROCYTE [DISTWIDTH] IN BLOOD BY AUTOMATED COUNT: 12.6 % (ref 11–15)
GFR SERPLBLD BASED ON 1.73 SQ M-ARVRAT: 57 ML/MIN/1.73M2 (ref 60–?)
GLUCOSE BLD-MCNC: 140 MG/DL (ref 70–99)
HCT VFR BLD AUTO: 37.3 %
HGB BLD-MCNC: 12.7 G/DL
IMM GRANULOCYTES # BLD AUTO: 0.03 X10(3) UL (ref 0–1)
IMM GRANULOCYTES NFR BLD: 0.4 %
LYMPHOCYTES # BLD AUTO: 0.94 X10(3) UL (ref 1–4)
LYMPHOCYTES NFR BLD AUTO: 11.8 %
MCH RBC QN AUTO: 32.8 PG (ref 26–34)
MCHC RBC AUTO-ENTMCNC: 34 G/DL (ref 31–37)
MCV RBC AUTO: 96.4 FL
MONOCYTES # BLD AUTO: 0.73 X10(3) UL (ref 0.1–1)
MONOCYTES NFR BLD AUTO: 9.2 %
NEUTROPHILS # BLD AUTO: 6.22 X10 (3) UL (ref 1.5–7.7)
NEUTROPHILS # BLD AUTO: 6.22 X10(3) UL (ref 1.5–7.7)
NEUTROPHILS NFR BLD AUTO: 78.1 %
OSMOLALITY SERPL CALC.SUM OF ELEC: 285 MOSM/KG (ref 275–295)
PLATELET # BLD AUTO: 174 10(3)UL (ref 150–450)
POTASSIUM SERPL-SCNC: 3.4 MMOL/L (ref 3.5–5.1)
PROT SERPL-MCNC: 6.7 G/DL (ref 6.4–8.2)
RBC # BLD AUTO: 3.87 X10(6)UL
SODIUM SERPL-SCNC: 133 MMOL/L (ref 136–145)
WBC # BLD AUTO: 8 X10(3) UL (ref 4–11)

## 2022-10-11 PROCEDURE — 99284 EMERGENCY DEPT VISIT MOD MDM: CPT

## 2022-10-11 PROCEDURE — 70551 MRI BRAIN STEM W/O DYE: CPT | Performed by: EMERGENCY MEDICINE

## 2022-10-11 PROCEDURE — 80076 HEPATIC FUNCTION PANEL: CPT | Performed by: EMERGENCY MEDICINE

## 2022-10-11 PROCEDURE — 85025 COMPLETE CBC W/AUTO DIFF WBC: CPT | Performed by: EMERGENCY MEDICINE

## 2022-10-11 PROCEDURE — 36415 COLL VENOUS BLD VENIPUNCTURE: CPT

## 2022-10-11 PROCEDURE — 80048 BASIC METABOLIC PNL TOTAL CA: CPT | Performed by: EMERGENCY MEDICINE

## 2022-10-11 RX ORDER — IBUPROFEN 600 MG/1
600 TABLET ORAL EVERY 8 HOURS PRN
Qty: 30 TABLET | Refills: 0 | Status: SHIPPED | OUTPATIENT
Start: 2022-10-11 | End: 2022-10-18

## 2022-10-11 RX ORDER — TRAMADOL HYDROCHLORIDE 50 MG/1
50 TABLET ORAL EVERY 6 HOURS PRN
Qty: 12 TABLET | Refills: 0 | Status: SHIPPED | OUTPATIENT
Start: 2022-10-11 | End: 2022-10-17

## 2022-10-11 NOTE — ED INITIAL ASSESSMENT (HPI)
Pt to ED with wife with c/o pain to left leg that starts near hip/buttock area for 4 days. Pt states multiple falls since pain started due to \"Weakness\" in his muscles. Denies fever.

## 2022-10-17 ENCOUNTER — NURSE TRIAGE (OUTPATIENT)
Dept: FAMILY MEDICINE CLINIC | Facility: CLINIC | Age: 77
End: 2022-10-17

## 2022-10-17 ENCOUNTER — APPOINTMENT (OUTPATIENT)
Dept: CT IMAGING | Facility: HOSPITAL | Age: 77
End: 2022-10-17
Attending: STUDENT IN AN ORGANIZED HEALTH CARE EDUCATION/TRAINING PROGRAM
Payer: MEDICARE

## 2022-10-17 ENCOUNTER — HOSPITAL ENCOUNTER (INPATIENT)
Facility: HOSPITAL | Age: 77
LOS: 6 days | Discharge: NURSING, REHAB OR LONG-TERM CARE | End: 2022-10-24
Attending: STUDENT IN AN ORGANIZED HEALTH CARE EDUCATION/TRAINING PROGRAM | Admitting: HOSPITALIST
Payer: MEDICARE

## 2022-10-17 DIAGNOSIS — N30.00 ACUTE CYSTITIS WITHOUT HEMATURIA: Primary | ICD-10-CM

## 2022-10-17 LAB
ANION GAP SERPL CALC-SCNC: 10 MMOL/L (ref 0–18)
BASOPHILS # BLD AUTO: 0.08 X10(3) UL (ref 0–0.2)
BASOPHILS NFR BLD AUTO: 0.6 %
BILIRUB UR QL: NEGATIVE
BUN BLD-MCNC: 26 MG/DL (ref 7–18)
BUN/CREAT SERPL: 22 (ref 10–20)
CALCIUM BLD-MCNC: 9.2 MG/DL (ref 8.5–10.1)
CHLORIDE SERPL-SCNC: 97 MMOL/L (ref 98–112)
CO2 SERPL-SCNC: 25 MMOL/L (ref 21–32)
COLOR UR: YELLOW
CREAT BLD-MCNC: 1.18 MG/DL
DEPRECATED RDW RBC AUTO: 42 FL (ref 35.1–46.3)
EOSINOPHIL # BLD AUTO: 0.09 X10(3) UL (ref 0–0.7)
EOSINOPHIL NFR BLD AUTO: 0.7 %
ERYTHROCYTE [DISTWIDTH] IN BLOOD BY AUTOMATED COUNT: 12.1 % (ref 11–15)
GFR SERPLBLD BASED ON 1.73 SQ M-ARVRAT: 64 ML/MIN/1.73M2 (ref 60–?)
GLUCOSE BLD-MCNC: 110 MG/DL (ref 70–99)
GLUCOSE UR-MCNC: NEGATIVE MG/DL
HCT VFR BLD AUTO: 38.4 %
HGB BLD-MCNC: 13.6 G/DL
IMM GRANULOCYTES # BLD AUTO: 0.08 X10(3) UL (ref 0–1)
IMM GRANULOCYTES NFR BLD: 0.6 %
LYMPHOCYTES # BLD AUTO: 1.63 X10(3) UL (ref 1–4)
LYMPHOCYTES NFR BLD AUTO: 12.7 %
MCH RBC QN AUTO: 33 PG (ref 26–34)
MCHC RBC AUTO-ENTMCNC: 35.4 G/DL (ref 31–37)
MCV RBC AUTO: 93.2 FL
MONOCYTES # BLD AUTO: 1.34 X10(3) UL (ref 0.1–1)
MONOCYTES NFR BLD AUTO: 10.5 %
NEUTROPHILS # BLD AUTO: 9.6 X10 (3) UL (ref 1.5–7.7)
NEUTROPHILS # BLD AUTO: 9.6 X10(3) UL (ref 1.5–7.7)
NEUTROPHILS NFR BLD AUTO: 74.9 %
NITRITE UR QL STRIP.AUTO: POSITIVE
OSMOLALITY SERPL CALC.SUM OF ELEC: 279 MOSM/KG (ref 275–295)
PH UR: 6.5 [PH] (ref 5–8)
PLATELET # BLD AUTO: 248 10(3)UL (ref 150–450)
POTASSIUM SERPL-SCNC: 3 MMOL/L (ref 3.5–5.1)
RBC # BLD AUTO: 4.12 X10(6)UL
SARS-COV-2 RNA RESP QL NAA+PROBE: NOT DETECTED
SODIUM SERPL-SCNC: 132 MMOL/L (ref 136–145)
SP GR UR STRIP: 1.01 (ref 1–1.03)
UROBILINOGEN UR STRIP-ACNC: 1
WBC # BLD AUTO: 12.8 X10(3) UL (ref 4–11)
WBC #/AREA URNS AUTO: >50 /HPF
WBC #/AREA URNS AUTO: >50 /HPF

## 2022-10-17 PROCEDURE — 74176 CT ABD & PELVIS W/O CONTRAST: CPT | Performed by: STUDENT IN AN ORGANIZED HEALTH CARE EDUCATION/TRAINING PROGRAM

## 2022-10-17 RX ORDER — TRAMADOL HYDROCHLORIDE 50 MG/1
50 TABLET ORAL EVERY 8 HOURS PRN
Qty: 10 TABLET | Refills: 0 | Status: SHIPPED | OUTPATIENT
Start: 2022-10-17 | End: 2022-10-24

## 2022-10-17 RX ORDER — ORPHENADRINE CITRATE 30 MG/ML
30 INJECTION INTRAMUSCULAR; INTRAVENOUS ONCE
Status: COMPLETED | OUTPATIENT
Start: 2022-10-17 | End: 2022-10-17

## 2022-10-17 RX ORDER — KETOROLAC TROMETHAMINE 30 MG/ML
30 INJECTION, SOLUTION INTRAMUSCULAR; INTRAVENOUS ONCE
Status: DISCONTINUED | OUTPATIENT
Start: 2022-10-17 | End: 2022-10-17

## 2022-10-17 RX ORDER — VANCOMYCIN/0.9 % SOD CHLORIDE 1.75 G/5
25 PLASTIC BAG, INJECTION (ML) INTRAVENOUS ONCE
Status: COMPLETED | OUTPATIENT
Start: 2022-10-17 | End: 2022-10-18

## 2022-10-17 RX ORDER — SULFAMETHOXAZOLE AND TRIMETHOPRIM 800; 160 MG/1; MG/1
1 TABLET ORAL 2 TIMES DAILY
Qty: 20 TABLET | Refills: 0 | Status: SHIPPED | OUTPATIENT
Start: 2022-10-17 | End: 2022-10-27

## 2022-10-17 RX ORDER — HYDROCODONE BITARTRATE AND ACETAMINOPHEN 5; 325 MG/1; MG/1
1 TABLET ORAL ONCE
Status: COMPLETED | OUTPATIENT
Start: 2022-10-17 | End: 2022-10-17

## 2022-10-17 NOTE — ED INITIAL ASSESSMENT (HPI)
Patient arrives via [facility] for low back pain radiating down left leg. Patient was seen for same symptoms last week; had MRI without any acute finding. Diagnosed with hip flexor strain.

## 2022-10-18 PROBLEM — N30.00 ACUTE CYSTITIS WITHOUT HEMATURIA: Status: ACTIVE | Noted: 2022-10-18

## 2022-10-18 LAB
APTT PPP: 25.1 SECONDS (ref 23.3–35.6)
INR BLD: 1 (ref 0.85–1.16)
INR BLD: 1.04 (ref 0.85–1.16)
LACTATE SERPL-SCNC: 0.5 MMOL/L (ref 0.4–2)
POTASSIUM SERPL-SCNC: 3.9 MMOL/L (ref 3.5–5.1)
PROTHROMBIN TIME: 13.1 SECONDS (ref 11.6–14.8)
PROTHROMBIN TIME: 13.5 SECONDS (ref 11.6–14.8)

## 2022-10-18 PROCEDURE — 99223 1ST HOSP IP/OBS HIGH 75: CPT | Performed by: HOSPITALIST

## 2022-10-18 RX ORDER — SENNOSIDES 8.6 MG
17.2 TABLET ORAL NIGHTLY PRN
Status: DISCONTINUED | OUTPATIENT
Start: 2022-10-18 | End: 2022-10-19

## 2022-10-18 RX ORDER — HYDROCODONE BITARTRATE AND ACETAMINOPHEN 5; 325 MG/1; MG/1
1 TABLET ORAL EVERY 4 HOURS PRN
Status: DISCONTINUED | OUTPATIENT
Start: 2022-10-18 | End: 2022-10-19

## 2022-10-18 RX ORDER — ACETAMINOPHEN 325 MG/1
650 TABLET ORAL EVERY 4 HOURS PRN
Status: DISCONTINUED | OUTPATIENT
Start: 2022-10-18 | End: 2022-10-18

## 2022-10-18 RX ORDER — ACETAMINOPHEN 500 MG
1000 TABLET ORAL EVERY 6 HOURS PRN
Status: DISCONTINUED | OUTPATIENT
Start: 2022-10-18 | End: 2022-10-24

## 2022-10-18 RX ORDER — MORPHINE SULFATE 2 MG/ML
2 INJECTION, SOLUTION INTRAMUSCULAR; INTRAVENOUS EVERY 4 HOURS PRN
Status: DISCONTINUED | OUTPATIENT
Start: 2022-10-18 | End: 2022-10-18

## 2022-10-18 RX ORDER — HYDROCODONE BITARTRATE AND ACETAMINOPHEN 10; 325 MG/1; MG/1
1 TABLET ORAL EVERY 4 HOURS PRN
Status: DISCONTINUED | OUTPATIENT
Start: 2022-10-18 | End: 2022-10-24

## 2022-10-18 RX ORDER — MORPHINE SULFATE 4 MG/ML
4 INJECTION, SOLUTION INTRAMUSCULAR; INTRAVENOUS EVERY 2 HOUR PRN
Status: DISCONTINUED | OUTPATIENT
Start: 2022-10-18 | End: 2022-10-20

## 2022-10-18 RX ORDER — ACETAMINOPHEN 500 MG
1000 TABLET ORAL EVERY 6 HOURS PRN
Status: DISCONTINUED | OUTPATIENT
Start: 2022-10-18 | End: 2022-10-18

## 2022-10-18 RX ORDER — ONDANSETRON 2 MG/ML
4 INJECTION INTRAMUSCULAR; INTRAVENOUS EVERY 6 HOURS PRN
Status: DISCONTINUED | OUTPATIENT
Start: 2022-10-18 | End: 2022-10-18

## 2022-10-18 RX ORDER — HYDROCODONE BITARTRATE AND ACETAMINOPHEN 7.5; 325 MG/1; MG/1
1 TABLET ORAL EVERY 6 HOURS PRN
Status: DISCONTINUED | OUTPATIENT
Start: 2022-10-18 | End: 2022-10-19

## 2022-10-18 RX ORDER — SODIUM PHOSPHATE, DIBASIC AND SODIUM PHOSPHATE, MONOBASIC 7; 19 G/133ML; G/133ML
1 ENEMA RECTAL ONCE AS NEEDED
Status: DISCONTINUED | OUTPATIENT
Start: 2022-10-18 | End: 2022-10-21

## 2022-10-18 RX ORDER — METOCLOPRAMIDE HYDROCHLORIDE 5 MG/ML
10 INJECTION INTRAMUSCULAR; INTRAVENOUS EVERY 8 HOURS PRN
Status: DISCONTINUED | OUTPATIENT
Start: 2022-10-18 | End: 2022-10-24

## 2022-10-18 RX ORDER — MORPHINE SULFATE 2 MG/ML
2 INJECTION, SOLUTION INTRAMUSCULAR; INTRAVENOUS EVERY 2 HOUR PRN
Status: DISCONTINUED | OUTPATIENT
Start: 2022-10-18 | End: 2022-10-20

## 2022-10-18 RX ORDER — FENOFIBRATE 134 MG/1
134 CAPSULE ORAL
Refills: 3 | Status: DISCONTINUED | OUTPATIENT
Start: 2022-10-18 | End: 2022-10-24

## 2022-10-18 RX ORDER — HYDROCODONE BITARTRATE AND ACETAMINOPHEN 5; 325 MG/1; MG/1
2 TABLET ORAL EVERY 4 HOURS PRN
Status: DISCONTINUED | OUTPATIENT
Start: 2022-10-18 | End: 2022-10-24

## 2022-10-18 RX ORDER — ATORVASTATIN CALCIUM 40 MG/1
80 TABLET, FILM COATED ORAL NIGHTLY
Status: DISCONTINUED | OUTPATIENT
Start: 2022-10-18 | End: 2022-10-24

## 2022-10-18 RX ORDER — BISACODYL 10 MG
10 SUPPOSITORY, RECTAL RECTAL
Status: DISCONTINUED | OUTPATIENT
Start: 2022-10-18 | End: 2022-10-24

## 2022-10-18 RX ORDER — POTASSIUM CHLORIDE 20 MEQ/1
40 TABLET, EXTENDED RELEASE ORAL EVERY 4 HOURS
Status: COMPLETED | OUTPATIENT
Start: 2022-10-18 | End: 2022-10-18

## 2022-10-18 RX ORDER — ONDANSETRON 2 MG/ML
4 INJECTION INTRAMUSCULAR; INTRAVENOUS EVERY 6 HOURS PRN
Status: DISCONTINUED | OUTPATIENT
Start: 2022-10-18 | End: 2022-10-24

## 2022-10-18 RX ORDER — POTASSIUM CHLORIDE 20 MEQ/1
20 TABLET, EXTENDED RELEASE ORAL ONCE
Status: DISCONTINUED | OUTPATIENT
Start: 2022-10-18 | End: 2022-10-21

## 2022-10-18 RX ORDER — SODIUM CHLORIDE 9 MG/ML
INJECTION, SOLUTION INTRAVENOUS CONTINUOUS
Status: DISCONTINUED | OUTPATIENT
Start: 2022-10-18 | End: 2022-10-20

## 2022-10-18 RX ORDER — POLYETHYLENE GLYCOL 3350 17 G/17G
17 POWDER, FOR SOLUTION ORAL DAILY PRN
Status: DISCONTINUED | OUTPATIENT
Start: 2022-10-18 | End: 2022-10-24

## 2022-10-18 RX ORDER — MORPHINE SULFATE 2 MG/ML
1 INJECTION, SOLUTION INTRAMUSCULAR; INTRAVENOUS EVERY 2 HOUR PRN
Status: DISCONTINUED | OUTPATIENT
Start: 2022-10-18 | End: 2022-10-20

## 2022-10-18 RX ORDER — FOLIC ACID 1 MG/1
1 TABLET ORAL DAILY
Status: DISCONTINUED | OUTPATIENT
Start: 2022-10-18 | End: 2022-10-24

## 2022-10-18 RX ORDER — HEPARIN SODIUM 5000 [USP'U]/ML
5000 INJECTION, SOLUTION INTRAVENOUS; SUBCUTANEOUS EVERY 8 HOURS SCHEDULED
Status: DISCONTINUED | OUTPATIENT
Start: 2022-10-18 | End: 2022-10-19

## 2022-10-18 NOTE — PROGRESS NOTES
Therapeutic interchange from fenofibrate 160mg to fenofibrate 134mg per P& T approved protocol      Thank you    [facility], [facility] Pharm D.

## 2022-10-18 NOTE — ED QUICK NOTES
Orders for admission, patient is aware of plan and ready to go upstairs. Any questions, please call ED RN april at extension [phone].      Patient Covid vaccination status: Fully vaccinated     COVID Test Ordered in ED: Rapid SARS-CoV-2 by PCR    COVID Suspicion at Admission: Low clinical suspicion for COVID    Running Infusions:      Mental Status/LOC at time of transport: A&Ox3 up and ambulatory     Other pertinent information:   CIWA score: N/A   NIH score:  N/A

## 2022-10-18 NOTE — PROGRESS NOTES
[facility] Dosing Service    Initial Pharmacokinetic Consult for Vancomycin Dosing     Yaron Altman is a 69year old patient who is being treated for UTI. Weights:  Ideal body weight: 73 kg (160 lb 15 oz)  Actual weight:  70.3 kg (155 lb)    Labs:  Lab Results   Component Value Date    CREATSERUM 1.18 10/17/2022      CrCl:  Estimated Creatinine Clearance: 53 mL/min (based on SCr of 1.18 mg/dL). Based on the above:    1. This patient has received a loading dose of Vancomycin  1750 mg IVPB (25mg/kg, capped at 2000 mg) x 1 dose. This will be followed by 1000 mg Q 12 hours based upon actual body weight of 70.3 kg and renal function. 2. Vancomycin level will be obtained prior to the 4th dose. Goal trough is 10-15 mcg/mL unless otherwise noted by ordering provider. 3. Pharmacy will order SCr as clinically indicated while on vancomycin to assess renal function. 4. Pharmacy will follow and monitor renal function, toxicity and efficacy. We appreciate the opportunity to assist in the care of this patient.     Beba Cunningham, Michelle  10/18/2022  6:46 AM  [facility]  Pharmacy Extension: [phone]

## 2022-10-18 NOTE — CM/SW NOTE
10/18/22 1500   CM/SW Referral Data   Referral Source Social Work (self-referral)   Reason for Referral Discharge planning   Informant Patient   Pertinent Medical Hx   Does patient have an established PCP? Yes  (Christi Ma)   Patient Info   Advanced directives? Yes  (Pt states that his wife is his POA)   Patient's Current Mental Status at Time of Assessment Alert;Oriented   Patient's [address]   Number of Levels in Home 2   Number of Stair in Home   (3 external steps to enter, 15 steps to bedroom)   Patient lives with Spouse/Significant other   Patient Status Prior to Admission   Independent with ADLs and Mobility No   Pt. requires assistance with Housework;Driving;Meals; Bathing; Ambulating   Services in place prior to admission DME/Supplies at home   Type of DME/Supplies Straight Cane;Standard Walker;Grab Bars   Discharge Needs   Anticipated D/C needs Home health care;Subacute rehab   Choice of Post-Acute Provider   Informed patient of right to choose their preferred provider Yes   SW met w/pt bedside for above assessment. Pt confirmed address on face sheet as correct. Pt states that he no longer drives and uses a walker for ambulation. Pt has SNF hx at [address] [facility], and has [facility] hx, unable to recall agency. PT recommendations are pending. SW/CM to remain available for support and/or discharge planning.      ESTER Mo, [facility]  Social Work   GWQ:#[id number]

## 2022-10-19 ENCOUNTER — APPOINTMENT (OUTPATIENT)
Dept: INTERVENTIONAL RADIOLOGY/VASCULAR | Facility: HOSPITAL | Age: 77
End: 2022-10-19
Attending: NURSE PRACTITIONER
Payer: MEDICARE

## 2022-10-19 LAB
ALBUMIN SERPL-MCNC: 2.9 G/DL (ref 3.4–5)
ALBUMIN/GLOB SERPL: 1.1 {RATIO} (ref 1–2)
ALP LIVER SERPL-CCNC: 70 U/L
ALT SERPL-CCNC: 37 U/L
ANION GAP SERPL CALC-SCNC: 7 MMOL/L (ref 0–18)
AST SERPL-CCNC: 26 U/L (ref 15–37)
BASOPHILS # BLD AUTO: 0.08 X10(3) UL (ref 0–0.2)
BASOPHILS NFR BLD AUTO: 1.2 %
BILIRUB SERPL-MCNC: 0.8 MG/DL (ref 0.1–2)
BUN BLD-MCNC: 15 MG/DL (ref 7–18)
BUN/CREAT SERPL: 16.3 (ref 10–20)
CALCIUM BLD-MCNC: 8.3 MG/DL (ref 8.5–10.1)
CHLORIDE SERPL-SCNC: 103 MMOL/L (ref 98–112)
CO2 SERPL-SCNC: 22 MMOL/L (ref 21–32)
CREAT BLD-MCNC: 0.92 MG/DL
DEPRECATED RDW RBC AUTO: 41.2 FL (ref 35.1–46.3)
EOSINOPHIL # BLD AUTO: 0.07 X10(3) UL (ref 0–0.7)
EOSINOPHIL NFR BLD AUTO: 1 %
ERYTHROCYTE [DISTWIDTH] IN BLOOD BY AUTOMATED COUNT: 11.9 % (ref 11–15)
GFR SERPLBLD BASED ON 1.73 SQ M-ARVRAT: 86 ML/MIN/1.73M2 (ref 60–?)
GLOBULIN PLAS-MCNC: 2.7 G/DL (ref 2.8–4.4)
GLUCOSE BLD-MCNC: 178 MG/DL (ref 70–99)
HCT VFR BLD AUTO: 31.6 %
HGB BLD-MCNC: 11.3 G/DL
IMM GRANULOCYTES # BLD AUTO: 0.04 X10(3) UL (ref 0–1)
IMM GRANULOCYTES NFR BLD: 0.6 %
LYMPHOCYTES # BLD AUTO: 1.01 X10(3) UL (ref 1–4)
LYMPHOCYTES NFR BLD AUTO: 14.6 %
MAGNESIUM SERPL-MCNC: 1.5 MG/DL (ref 1.6–2.6)
MCH RBC QN AUTO: 33.2 PG (ref 26–34)
MCHC RBC AUTO-ENTMCNC: 35.8 G/DL (ref 31–37)
MCV RBC AUTO: 92.9 FL
MONOCYTES # BLD AUTO: 0.61 X10(3) UL (ref 0.1–1)
MONOCYTES NFR BLD AUTO: 8.8 %
NEUTROPHILS # BLD AUTO: 5.1 X10 (3) UL (ref 1.5–7.7)
NEUTROPHILS # BLD AUTO: 5.1 X10(3) UL (ref 1.5–7.7)
NEUTROPHILS NFR BLD AUTO: 73.8 %
OSMOLALITY SERPL CALC.SUM OF ELEC: 279 MOSM/KG (ref 275–295)
PHOSPHATE SERPL-MCNC: 2.8 MG/DL (ref 2.5–4.9)
PLATELET # BLD AUTO: 257 10(3)UL (ref 150–450)
POTASSIUM SERPL-SCNC: 3.8 MMOL/L (ref 3.5–5.1)
PROT SERPL-MCNC: 5.6 G/DL (ref 6.4–8.2)
RBC # BLD AUTO: 3.4 X10(6)UL
SODIUM SERPL-SCNC: 132 MMOL/L (ref 136–145)
VANCOMYCIN PEAK SERPL-MCNC: 28.9 UG/ML (ref 30–50)
VANCOMYCIN TROUGH SERPL-MCNC: 14.5 UG/ML (ref 10–20)
VIT D+METAB SERPL-MCNC: 16.5 NG/ML (ref 30–100)
WBC # BLD AUTO: 6.9 X10(3) UL (ref 4–11)

## 2022-10-19 PROCEDURE — 99233 SBSQ HOSP IP/OBS HIGH 50: CPT | Performed by: HOSPITALIST

## 2022-10-19 PROCEDURE — 0QU03JZ SUPPLEMENT LUMBAR VERTEBRA WITH SYNTHETIC SUBSTITUTE, PERCUTANEOUS APPROACH: ICD-10-PCS | Performed by: RADIOLOGY

## 2022-10-19 PROCEDURE — 0QS03ZZ REPOSITION LUMBAR VERTEBRA, PERCUTANEOUS APPROACH: ICD-10-PCS | Performed by: RADIOLOGY

## 2022-10-19 RX ORDER — MAGNESIUM SULFATE HEPTAHYDRATE 40 MG/ML
2 INJECTION, SOLUTION INTRAVENOUS ONCE
Status: DISCONTINUED | OUTPATIENT
Start: 2022-10-19 | End: 2022-10-19

## 2022-10-19 RX ORDER — LIDOCAINE HYDROCHLORIDE 20 MG/ML
INJECTION, SOLUTION EPIDURAL; INFILTRATION; INTRACAUDAL; PERINEURAL
Status: COMPLETED
Start: 2022-10-19 | End: 2022-10-19

## 2022-10-19 RX ORDER — ERGOCALCIFEROL 1.25 MG/1
50000 CAPSULE ORAL ONCE
Status: COMPLETED | OUTPATIENT
Start: 2022-10-19 | End: 2022-10-19

## 2022-10-19 RX ORDER — MIDAZOLAM HYDROCHLORIDE 1 MG/ML
INJECTION INTRAMUSCULAR; INTRAVENOUS
Status: COMPLETED
Start: 2022-10-19 | End: 2022-10-19

## 2022-10-19 RX ORDER — SENNA AND DOCUSATE SODIUM 50; 8.6 MG/1; MG/1
2 TABLET, FILM COATED ORAL DAILY
Status: DISCONTINUED | OUTPATIENT
Start: 2022-10-20 | End: 2022-10-21

## 2022-10-19 NOTE — CM/SW NOTE
SW sent tentative referrals to [facility] via Aidin. Awaiting PT/OT notes to be put in. Plan: awaiting PT OT recs, tent [facility] referral, send PT OT notes via aidin, and to do PASSAR. SHELLY/TAY to remain available for support and/or discharge planning.      Aidan Cantu, MSW, LSW

## 2022-10-19 NOTE — PROCEDURES
[facility]  Procedure Note    Mercedes Light Patient Status:  Inpatient    1945 MRN [id number]   Location [facility] 1W Attending Ruby Sagastume MD   Hosp Day # 1 PCP Nato Tatum DO     Procedure: L1 Kyphoplasty    Pre-Procedure Diagnosis: L1 vertebral compression fracture      Post-Procedure Diagnosis: same    Anesthesia:  Local and Sedation    Findings:  L1 vertebral body accessed via left transpedicular approach. Cavity created with balloon. 6mL PMMA infused with good fill across midline. Specimens: None    Blood Loss:  2mL      Complications:  None    Drains:  None    Plan:  Bedrest for 2 hours.     Александр Durbin MD  10/19/2022

## 2022-10-19 NOTE — CM/SW NOTE
SW completed PASRR level 1 - it is currently pending review. Will need PASRR document uploaded to [facility] once determined if level 2 is required.         Beto Hardy, MSW, [address]

## 2022-10-20 LAB
ALBUMIN SERPL-MCNC: 2.9 G/DL (ref 3.4–5)
ANION GAP SERPL CALC-SCNC: 8 MMOL/L (ref 0–18)
BUN BLD-MCNC: 10 MG/DL (ref 7–18)
BUN/CREAT SERPL: 12.5 (ref 10–20)
CALCIUM BLD-MCNC: 8.1 MG/DL (ref 8.5–10.1)
CHLORIDE SERPL-SCNC: 101 MMOL/L (ref 98–112)
CO2 SERPL-SCNC: 24 MMOL/L (ref 21–32)
CREAT BLD-MCNC: 0.8 MG/DL
DEPRECATED RDW RBC AUTO: 41.5 FL (ref 35.1–46.3)
ERYTHROCYTE [DISTWIDTH] IN BLOOD BY AUTOMATED COUNT: 11.8 % (ref 11–15)
GFR SERPLBLD BASED ON 1.73 SQ M-ARVRAT: 92 ML/MIN/1.73M2 (ref 60–?)
GLUCOSE BLD-MCNC: 105 MG/DL (ref 70–99)
HCT VFR BLD AUTO: 33.7 %
HGB BLD-MCNC: 11.6 G/DL
MAGNESIUM SERPL-MCNC: 2.1 MG/DL (ref 1.6–2.6)
MCH RBC QN AUTO: 32.8 PG (ref 26–34)
MCHC RBC AUTO-ENTMCNC: 34.4 G/DL (ref 31–37)
MCV RBC AUTO: 95.2 FL
OSMOLALITY SERPL CALC.SUM OF ELEC: 275 MOSM/KG (ref 275–295)
PHOSPHATE SERPL-MCNC: 2.8 MG/DL (ref 2.5–4.9)
PLATELET # BLD AUTO: 304 10(3)UL (ref 150–450)
POTASSIUM SERPL-SCNC: 3.7 MMOL/L (ref 3.5–5.1)
RBC # BLD AUTO: 3.54 X10(6)UL
SODIUM SERPL-SCNC: 133 MMOL/L (ref 136–145)
WBC # BLD AUTO: 9.9 X10(3) UL (ref 4–11)

## 2022-10-20 PROCEDURE — 99233 SBSQ HOSP IP/OBS HIGH 50: CPT | Performed by: HOSPITALIST

## 2022-10-20 RX ORDER — HYDRALAZINE HYDROCHLORIDE 50 MG/1
50 TABLET, FILM COATED ORAL EVERY 6 HOURS PRN
Status: DISCONTINUED | OUTPATIENT
Start: 2022-10-20 | End: 2022-10-24

## 2022-10-20 RX ORDER — ERGOCALCIFEROL 1.25 MG/1
50000 CAPSULE ORAL ONCE
Status: COMPLETED | OUTPATIENT
Start: 2022-10-20 | End: 2022-10-20

## 2022-10-20 RX ORDER — SULFAMETHOXAZOLE AND TRIMETHOPRIM 800; 160 MG/1; MG/1
1 TABLET ORAL EVERY 12 HOURS SCHEDULED
Status: DISCONTINUED | OUTPATIENT
Start: 2022-10-20 | End: 2022-10-24

## 2022-10-20 RX ORDER — AMLODIPINE BESYLATE 5 MG/1
5 TABLET ORAL DAILY
Status: DISCONTINUED | OUTPATIENT
Start: 2022-10-20 | End: 2022-10-24

## 2022-10-20 NOTE — PLAN OF CARE
Mervin Alvarado reports minimal pain today, relieved with lidocaine patch and nonpharmacologic measures. Intermittent confusion after norco this morning, reoriented to place/situation several times today. H/o etoh abuse per wife, reports last drink was several weeks ago. Bed alarm set/audible as pt is unsteady on feet and forgets to call for assistance. Plan for subacute rehab on discharge.     Problem: SKIN/TISSUE INTEGRITY - ADULT  Goal: Skin integrity remains intact  Description: INTERVENTIONS  - Assess and document risk factors for pressure ulcer development  - Assess and document skin integrity  - Monitor for areas of redness and/or skin breakdown  - Initiate interventions, skin care algorithm/standards of care as needed  Outcome: Progressing     Problem: MUSCULOSKELETAL - ADULT  Goal: Return mobility to safest level of function  Description: INTERVENTIONS:  - Assess patient stability and activity tolerance for standing, transferring and ambulating w/ or w/o assistive devices  - Assist with transfers and ambulation using safe patient handling equipment as needed  - Ensure adequate protection for wounds/incisions during mobilization  - Obtain PT/OT consults as needed  - Advance activity as appropriate  - Communicate ordered activity level and limitations with patient/family  Outcome: Progressing     Problem: PAIN - ADULT  Goal: Verbalizes/displays adequate comfort level or patient's stated pain goal  Description: INTERVENTIONS:  - Encourage pt to monitor pain and request assistance  - Assess pain using appropriate pain scale  - Administer analgesics based on type and severity of pain and evaluate response  - Implement non-pharmacological measures as appropriate and evaluate response  - Consider cultural and social influences on pain and pain management  - Manage/alleviate anxiety  - Utilize distraction and/or relaxation techniques  - Monitor for opioid side effects  - Notify MD/LIP if interventions unsuccessful or patient reports new pain  - Anticipate increased pain with activity and pre-medicate as appropriate  Outcome: Progressing     Problem: SAFETY ADULT - FALL  Goal: Free from fall injury  Description: INTERVENTIONS:  - Assess pt frequently for physical needs  - Identify cognitive and physical deficits and behaviors that affect risk of falls.   - [place] fall precautions as indicated by assessment.  - Educate pt/family on patient safety including physical limitations  - Instruct pt to call for assistance with activity based on assessment  - Modify environment to reduce risk of injury  - Provide assistive devices as appropriate  - Consider OT/PT consult to assist with strengthening/mobility  - Encourage toileting schedule  Outcome: Progressing     Problem: DISCHARGE PLANNING  Goal: Discharge to home or other facility with appropriate resources  Description: INTERVENTIONS:  - Identify barriers to discharge w/pt and caregiver  - Include patient/family/discharge partner in discharge planning  - Arrange for needed discharge resources and transportation as appropriate  - Identify discharge learning needs (meds, wound care, etc)  - Arrange for interpreters to assist at discharge as needed  - Consider post-discharge preferences of patient/family/discharge partner  - Complete POLST form as appropriate  - Assess patient's ability to be responsible for managing their own health  - Refer to Case Management Department for coordinating discharge planning if the patient needs post-hospital services based on physician/LIP order or complex needs related to functional status, cognitive ability or social support system  Outcome: Progressing

## 2022-10-20 NOTE — CM/SW NOTE
Was asked by Darin Aldana RN to call wife. Left message for wife on her cell phone with my number. Awaiting call back. Therapy is recommending [facility]- notes to sent to [place]. Search to be expanded with new notes and then follow-up with choices. Need to confirm insurance as showing MCR B, BC. / to remain available for support and/or discharge planning.      Alessio BUCHANANN RN [address]  RN Case Manager  [phone]

## 2022-10-21 LAB
ANION GAP SERPL CALC-SCNC: 10 MMOL/L (ref 0–18)
BUN BLD-MCNC: 13 MG/DL (ref 7–18)
BUN/CREAT SERPL: 12.4 (ref 10–20)
CALCIUM BLD-MCNC: 9.1 MG/DL (ref 8.5–10.1)
CHLORIDE SERPL-SCNC: 97 MMOL/L (ref 98–112)
CO2 SERPL-SCNC: 25 MMOL/L (ref 21–32)
CREAT BLD-MCNC: 1.05 MG/DL
DEPRECATED RDW RBC AUTO: 40.2 FL (ref 35.1–46.3)
ERYTHROCYTE [DISTWIDTH] IN BLOOD BY AUTOMATED COUNT: 11.9 % (ref 11–15)
GFR SERPLBLD BASED ON 1.73 SQ M-ARVRAT: 74 ML/MIN/1.73M2 (ref 60–?)
GLUCOSE BLD-MCNC: 128 MG/DL (ref 70–99)
HCT VFR BLD AUTO: 34.5 %
HGB BLD-MCNC: 12.4 G/DL
MCH RBC QN AUTO: 33 PG (ref 26–34)
MCHC RBC AUTO-ENTMCNC: 35.9 G/DL (ref 31–37)
MCV RBC AUTO: 91.8 FL
OSMOLALITY SERPL CALC.SUM OF ELEC: 276 MOSM/KG (ref 275–295)
PLATELET # BLD AUTO: 348 10(3)UL (ref 150–450)
POTASSIUM SERPL-SCNC: 3.4 MMOL/L (ref 3.5–5.1)
RBC # BLD AUTO: 3.76 X10(6)UL
SODIUM SERPL-SCNC: 132 MMOL/L (ref 136–145)
WBC # BLD AUTO: 10 X10(3) UL (ref 4–11)

## 2022-10-21 PROCEDURE — 99233 SBSQ HOSP IP/OBS HIGH 50: CPT | Performed by: HOSPITALIST

## 2022-10-21 RX ORDER — SENNA AND DOCUSATE SODIUM 50; 8.6 MG/1; MG/1
2 TABLET, FILM COATED ORAL DAILY
Qty: 30 TABLET | Refills: 0 | Status: SHIPPED | OUTPATIENT
Start: 2022-10-21

## 2022-10-21 RX ORDER — VANCOMYCIN HYDROCHLORIDE 125 MG/1
125 CAPSULE ORAL DAILY
Qty: 10 CAPSULE | Refills: 0 | Status: SHIPPED | OUTPATIENT
Start: 2022-10-21

## 2022-10-21 RX ORDER — AMLODIPINE BESYLATE 5 MG/1
5 TABLET ORAL DAILY
Refills: 0 | Status: SHIPPED | COMMUNITY
Start: 2022-10-21

## 2022-10-21 RX ORDER — VANCOMYCIN HYDROCHLORIDE 125 MG/1
125 CAPSULE ORAL DAILY
Status: DISCONTINUED | OUTPATIENT
Start: 2022-10-21 | End: 2022-10-24

## 2022-10-21 RX ORDER — SODIUM CHLORIDE 9 MG/ML
INJECTION, SOLUTION INTRAVENOUS CONTINUOUS
Status: DISCONTINUED | OUTPATIENT
Start: 2022-10-21 | End: 2022-10-24

## 2022-10-21 RX ORDER — ACETAMINOPHEN 500 MG
1000 TABLET ORAL EVERY 6 HOURS PRN
Refills: 0 | Status: SHIPPED | COMMUNITY
Start: 2022-10-21

## 2022-10-21 RX ORDER — GARLIC EXTRACT 500 MG
1 CAPSULE ORAL 2 TIMES DAILY
Qty: 30 CAPSULE | Refills: 0 | Status: SHIPPED | OUTPATIENT
Start: 2022-10-21

## 2022-10-21 RX ORDER — POTASSIUM CHLORIDE 20 MEQ/1
40 TABLET, EXTENDED RELEASE ORAL ONCE
Status: COMPLETED | OUTPATIENT
Start: 2022-10-21 | End: 2022-10-21

## 2022-10-21 RX ORDER — GARLIC EXTRACT 500 MG
1 CAPSULE ORAL 2 TIMES DAILY
Status: DISCONTINUED | OUTPATIENT
Start: 2022-10-21 | End: 2022-10-24

## 2022-10-21 NOTE — CM/SW NOTE
TAY provided via email the SNF list to spouse. In addition, spoke w/wife of SNF process to include transportation. Per wife she is not ready to provide choice. She wants to discuss further with patient. TAY informed RN/Jessi of above.

## 2022-10-21 NOTE — CM/SW NOTE
Received via RN of wife's [facility] reema, MARIANN Guevara [address]. CM reserved in aidin. CM spoke w/[facility] liaison who stated does not have bed today, possibly tomorrow. Per Faby Ml will need second booster too. CM notified Galina Florian of the above. CM/SW to remain available for support and/or discharge planning.     Carlos Atkinson RN, [facility]    Ext.  [phone]

## 2022-10-22 LAB — POTASSIUM SERPL-SCNC: 3.6 MMOL/L (ref 3.5–5.1)

## 2022-10-22 PROCEDURE — 99232 SBSQ HOSP IP/OBS MODERATE 35: CPT | Performed by: HOSPITALIST

## 2022-10-22 NOTE — CM/SW NOTE
08: 45AM  Received message via Aidin from jeff Yuen overnight - pt's Medicare Part B is primary insurance. Per Cirilo Curtis, because pt does not have Medicare Part A, they will need to submit for insurance approval through pt's secondary BCBS PPO plan. SHELLY contacted registration and requested they re-run pt's insurance to confirm he inman snot have Medicare Part A.    SW pending call back from registration. 09:20AM  Received call from Registration - confirmed pt has Medicare A + B. Pt's demographics/Epic has been updated. SHELLY sent message to jeff in Aidin - pending response on whether they can accept today. 03:40PM  SW spoke to liaryan Curtis - they will likely have a bed available tomorrow Karlos 10/23 for pt. PLAN: [facility] [place] [facility] - pending response from facility, rapid COVID, and COVID booster      SW/TAY to remain available for support and/or discharge planning.          Sabra Alberto, MSW, [address] [facility]

## 2022-10-23 PROCEDURE — 99232 SBSQ HOSP IP/OBS MODERATE 35: CPT | Performed by: HOSPITALIST

## 2022-10-24 VITALS
SYSTOLIC BLOOD PRESSURE: 128 MMHG | DIASTOLIC BLOOD PRESSURE: 90 MMHG | RESPIRATION RATE: 18 BRPM | OXYGEN SATURATION: 94 % | HEIGHT: 70 IN | WEIGHT: 145.69 LBS | HEART RATE: 97 BPM | BODY MASS INDEX: 20.86 KG/M2 | TEMPERATURE: 98 F

## 2022-10-24 LAB — SARS-COV-2 RNA RESP QL NAA+PROBE: NOT DETECTED

## 2022-10-24 PROCEDURE — 99239 HOSP IP/OBS DSCHRG MGMT >30: CPT | Performed by: HOSPITALIST

## 2022-10-24 RX ORDER — TRAMADOL HYDROCHLORIDE 50 MG/1
50 TABLET ORAL EVERY 8 HOURS PRN
Qty: 10 TABLET | Refills: 0 | Status: SHIPPED | OUTPATIENT
Start: 2022-10-24

## 2022-10-24 NOTE — CM/SW NOTE
10/22/22 1353   Discharge disposition   Expected discharge disposition [address] [facility] [place] Provider [facility]   Discharge transportation [facility]     The pt. Is scheduled to discharge to Critical access hospital today 10/24 at 1p, via ambulance. PCS complete in Epic- RN to print with AVS.   SW left a message for the pt's wife Terri Kyle with the above information.     Report [address], [address] [facility]

## 2022-12-08 ENCOUNTER — APPOINTMENT (OUTPATIENT)
Dept: GENERAL RADIOLOGY | Facility: HOSPITAL | Age: 77
End: 2022-12-08
Attending: EMERGENCY MEDICINE
Payer: MEDICARE

## 2022-12-08 ENCOUNTER — MED REC SCAN ONLY (OUTPATIENT)
Dept: FAMILY MEDICINE CLINIC | Facility: CLINIC | Age: 77
End: 2022-12-08

## 2022-12-08 ENCOUNTER — HOSPITAL ENCOUNTER (OUTPATIENT)
Facility: HOSPITAL | Age: 77
Setting detail: OBSERVATION
Discharge: NURSING, REHAB OR LONG-TERM CARE | End: 2022-12-10
Attending: EMERGENCY MEDICINE | Admitting: HOSPITALIST
Payer: MEDICARE

## 2022-12-08 DIAGNOSIS — R07.9 CHEST PAIN WITH MODERATE RISK FOR CARDIAC ETIOLOGY: Primary | ICD-10-CM

## 2022-12-08 LAB
ANION GAP SERPL CALC-SCNC: 10 MMOL/L (ref 0–18)
APTT PPP: 27 SECONDS (ref 23.3–35.6)
ATRIAL RATE: 58 BPM
ATRIAL RATE: 64 BPM
BASOPHILS # BLD AUTO: 0.06 X10(3) UL (ref 0–0.2)
BASOPHILS NFR BLD AUTO: 0.5 %
BUN BLD-MCNC: 29 MG/DL (ref 7–18)
BUN/CREAT SERPL: 24.8 (ref 10–20)
CALCIUM BLD-MCNC: 9.3 MG/DL (ref 8.5–10.1)
CHLORIDE SERPL-SCNC: 104 MMOL/L (ref 98–112)
CO2 SERPL-SCNC: 21 MMOL/L (ref 21–32)
CREAT BLD-MCNC: 1.17 MG/DL
DEPRECATED RDW RBC AUTO: 44.2 FL (ref 35.1–46.3)
EOSINOPHIL # BLD AUTO: 0.19 X10(3) UL (ref 0–0.7)
EOSINOPHIL NFR BLD AUTO: 1.5 %
ERYTHROCYTE [DISTWIDTH] IN BLOOD BY AUTOMATED COUNT: 12.5 % (ref 11–15)
GFR SERPLBLD BASED ON 1.73 SQ M-ARVRAT: 65 ML/MIN/1.73M2 (ref 60–?)
GLUCOSE BLD-MCNC: 108 MG/DL (ref 70–99)
HCT VFR BLD AUTO: 34.6 %
HGB BLD-MCNC: 11.3 G/DL
IMM GRANULOCYTES # BLD AUTO: 0.08 X10(3) UL (ref 0–1)
IMM GRANULOCYTES NFR BLD: 0.6 %
INR BLD: 0.99 (ref 0.85–1.16)
LYMPHOCYTES # BLD AUTO: 2.78 X10(3) UL (ref 1–4)
LYMPHOCYTES NFR BLD AUTO: 22.2 %
MCH RBC QN AUTO: 31.4 PG (ref 26–34)
MCHC RBC AUTO-ENTMCNC: 32.7 G/DL (ref 31–37)
MCV RBC AUTO: 96.1 FL
MONOCYTES # BLD AUTO: 0.82 X10(3) UL (ref 0.1–1)
MONOCYTES NFR BLD AUTO: 6.5 %
NEUTROPHILS # BLD AUTO: 8.61 X10 (3) UL (ref 1.5–7.7)
NEUTROPHILS # BLD AUTO: 8.61 X10(3) UL (ref 1.5–7.7)
NEUTROPHILS NFR BLD AUTO: 68.7 %
NT-PROBNP SERPL-MCNC: 208 PG/ML (ref ?–450)
OSMOLALITY SERPL CALC.SUM OF ELEC: 286 MOSM/KG (ref 275–295)
P AXIS: 43 DEGREES
P AXIS: 53 DEGREES
P-R INTERVAL: 142 MS
P-R INTERVAL: 156 MS
PLATELET # BLD AUTO: 443 10(3)UL (ref 150–450)
POTASSIUM SERPL-SCNC: 3.7 MMOL/L (ref 3.5–5.1)
PROTHROMBIN TIME: 13 SECONDS (ref 11.6–14.8)
Q-T INTERVAL: 426 MS
Q-T INTERVAL: 426 MS
QRS DURATION: 70 MS
QRS DURATION: 74 MS
QTC CALCULATION (BEZET): 418 MS
QTC CALCULATION (BEZET): 439 MS
R AXIS: 35 DEGREES
R AXIS: 36 DEGREES
RBC # BLD AUTO: 3.6 X10(6)UL
SARS-COV-2 RNA RESP QL NAA+PROBE: NOT DETECTED
SODIUM SERPL-SCNC: 135 MMOL/L (ref 136–145)
T AXIS: 22 DEGREES
T AXIS: 35 DEGREES
TROPONIN I HIGH SENSITIVITY: 6 NG/L
TROPONIN I HIGH SENSITIVITY: 7 NG/L
VENTRICULAR RATE: 58 BPM
VENTRICULAR RATE: 64 BPM
WBC # BLD AUTO: 12.5 X10(3) UL (ref 4–11)

## 2022-12-08 PROCEDURE — 99219 INITIAL OBSERVATION CARE,LEVL II: CPT | Performed by: HOSPITALIST

## 2022-12-08 PROCEDURE — 71045 X-RAY EXAM CHEST 1 VIEW: CPT | Performed by: EMERGENCY MEDICINE

## 2022-12-08 RX ORDER — AMLODIPINE BESYLATE 5 MG/1
5 TABLET ORAL DAILY
Status: DISCONTINUED | OUTPATIENT
Start: 2022-12-08 | End: 2022-12-10

## 2022-12-08 RX ORDER — TRAMADOL HYDROCHLORIDE 50 MG/1
50 TABLET ORAL EVERY 8 HOURS PRN
Status: DISCONTINUED | OUTPATIENT
Start: 2022-12-08 | End: 2022-12-10

## 2022-12-08 RX ORDER — IBUPROFEN 600 MG/1
600 TABLET ORAL ONCE
Status: COMPLETED | OUTPATIENT
Start: 2022-12-08 | End: 2022-12-08

## 2022-12-08 RX ORDER — ACETAMINOPHEN 500 MG
500 TABLET ORAL EVERY 4 HOURS PRN
Status: DISCONTINUED | OUTPATIENT
Start: 2022-12-08 | End: 2022-12-10

## 2022-12-08 RX ORDER — ASPIRIN 325 MG
325 TABLET ORAL DAILY
Status: DISCONTINUED | OUTPATIENT
Start: 2022-12-08 | End: 2022-12-10

## 2022-12-08 RX ORDER — NITROGLYCERIN 0.4 MG/1
0.4 TABLET SUBLINGUAL EVERY 5 MIN PRN
Status: DISCONTINUED | OUTPATIENT
Start: 2022-12-08 | End: 2022-12-10

## 2022-12-08 RX ORDER — ATORVASTATIN CALCIUM 80 MG/1
80 TABLET, FILM COATED ORAL DAILY
Status: DISCONTINUED | OUTPATIENT
Start: 2022-12-09 | End: 2022-12-10

## 2022-12-08 RX ORDER — FOLIC ACID 1 MG/1
1 TABLET ORAL DAILY
Status: DISCONTINUED | OUTPATIENT
Start: 2022-12-09 | End: 2022-12-10

## 2022-12-08 RX ORDER — TEMAZEPAM 15 MG/1
15 CAPSULE ORAL NIGHTLY PRN
Status: DISCONTINUED | OUTPATIENT
Start: 2022-12-08 | End: 2022-12-10

## 2022-12-08 RX ORDER — METOCLOPRAMIDE HYDROCHLORIDE 5 MG/ML
10 INJECTION INTRAMUSCULAR; INTRAVENOUS EVERY 8 HOURS PRN
Status: DISCONTINUED | OUTPATIENT
Start: 2022-12-08 | End: 2022-12-10

## 2022-12-08 RX ORDER — ONDANSETRON 2 MG/ML
4 INJECTION INTRAMUSCULAR; INTRAVENOUS EVERY 6 HOURS PRN
Status: DISCONTINUED | OUTPATIENT
Start: 2022-12-08 | End: 2022-12-10

## 2022-12-08 RX ORDER — ENOXAPARIN SODIUM 100 MG/ML
40 INJECTION SUBCUTANEOUS DAILY
Status: DISCONTINUED | OUTPATIENT
Start: 2022-12-08 | End: 2022-12-10

## 2022-12-08 NOTE — H&P
[facility]    PATIENT'S NAME: [facility]   ATTENDING PHYSICIAN: Stefany Clay MD   PATIENT ACCOUNT#:   [de-identified]    LOCATION:  [address]  MEDICAL RECORD #:   [id number]       YOB: 1945  ADMISSION DATE:       12/08/2022    HISTORY AND PHYSICAL EXAMINATION    CHIEF COMPLAINT:  Chest pain. HISTORY OF PRESENT ILLNESS:  Patient is a 60-year-old  male, who came into the emergency department for evaluation of chest pain while he was having a shower earlier today. CBC showed white blood cell count of 12.5. Chemistry, troponin, proBNP were unremarkable. Chest x-ray showed no acute finding. EKG showed normal sinus rhythm. PAST MEDICAL HISTORY:  Coronary artery disease, status post coronary artery bypass graft surgery in 2018. He has a history of prolonged alcoholism with history of alcohol withdrawal hospitalizations, but he had cut down and he almost does not drink alcohol anymore. He has a history of benign prostatic hypertrophy, hypertension, hyperlipidemia, mild carotid atherosclerosis, non-insulin-dependent diabetes mellitus type 2, generalized osteoarthritis, possible peripheral neuropathy and chronic gait abnormality. PAST SURGICAL HISTORY:  Coronary artery bypass graft surgery, cataract procedure, tonsillectomy, and L1 kyphoplasty. MEDICATIONS:  Please see medication reconciliation list.     ALLERGIES:  Ramipril causes angioedema and side effects to metoprolol. SOCIAL HISTORY:  Ex-tobacco user. He cut down significantly on his alcohol consumption. He will have a drink every couple of days. Lives with his wife. At baseline, independent in his basic activities of daily living. He said he has a walker, but he does not use it. REVIEW OF SYSTEMS:  Patient said while he was having a shower he had intense achy pain in the precordial area radiating to his left shoulder and left arm without physical activity.   Patient said the pain is still there but much milder. Other 12-point review of systems is negative. He denies any recent trauma or heavy lifting. PHYSICAL EXAMINATION:    GENERAL:  Alert and oriented to time, place and person. No acute distress. VITAL SIGNS:  Temperature 97.5, pulse 65, respiratory rate 11, blood pressure 153/73, pulse ox 98% on room air. HEENT:  Atraumatic. Oropharynx clear. Moist mucous membranes. Normal hard and soft palate. Eyes:  Anicteric sclerae. NECK:  Supple. No lymphadenopathy. Trachea midline. Full range of motion. LUNGS:  Clear to auscultation bilaterally. Normal respiratory effort. HEART:  Regular rate and rhythm. S1 and S2 auscultated. No murmur. ABDOMEN:  Soft, nondistended. No tenderness. Positive bowel sounds. EXTREMITIES:  No peripheral edema, clubbing or cyanosis. NEUROLOGIC:  Motor and sensory intact. ASSESSMENT:    1. Chest pain of unclear clinical significance. History of coronary artery disease and coronary artery bypass graft surgery. 2.   Gait instability. PLAN:  Patient will be admitted to general medical floor. Obtain Lexiscan stress test in the morning. Cardiology consult. Fall precautions. Further recommendations to follow.      Dictated By Shiraz Denson MD  d: 12/08/2022 14:49:09  t: 12/08/2022 15:16:45  Job [id number]  /

## 2022-12-08 NOTE — ED QUICK NOTES
Ex wife verbalizing need for NH placement. Patient was sent home with her since pt had nowhere else to go and does not believes hes appropriate anymore, unable to care for him 24/7-requesting long term nursing home placement. MD aware.   aware    Manuel Schrader- [phone]

## 2022-12-08 NOTE — ED INITIAL ASSESSMENT (HPI)
Pt to the ed with left sided chest pain that started while he was in the shower this morning. Pt denies any radiation of pain and denies any sob.

## 2022-12-08 NOTE — ED QUICK NOTES
Patient initially refusing CXR. This rn to room and patient states he didn't refuse, now agreeable.  Wife at bedside-verbalizes concern for uti bc buildup on his finley

## 2022-12-08 NOTE — ED QUICK NOTES
Orders for admission, patient is aware of plan and ready to go upstairs. Any questions, please call ED RN socrates at extension [phone].      Patient Covid vaccination status: Fully vaccinated     COVID Test Ordered in ED: Rapid SARS-CoV-2 by PCR    COVID Suspicion at Admission: N/A    Running Infusions:  None    Mental Status/LOC at time of transport: aox3, sometimes forgetful and confused; [place]    Other pertinent information:   CIWA score: N/A   NIH score:  N/A

## 2022-12-08 NOTE — CM/SW NOTE
Spoke to the pt at the bedside concerning rehab placement. He was verbalizing how he would like to live on the second floor and have friends care for him, but he knows that is not feasible right now. We did discuss short term rehab and he said to go ahead and send out referrals. He has been to [facility] in the past.     Dr Dotson Else updated.

## 2022-12-09 ENCOUNTER — APPOINTMENT (OUTPATIENT)
Dept: NUCLEAR MEDICINE | Facility: HOSPITAL | Age: 77
End: 2022-12-09
Attending: INTERNAL MEDICINE
Payer: MEDICARE

## 2022-12-09 ENCOUNTER — APPOINTMENT (OUTPATIENT)
Dept: CV DIAGNOSTICS | Facility: HOSPITAL | Age: 77
End: 2022-12-09
Attending: INTERNAL MEDICINE
Payer: MEDICARE

## 2022-12-09 LAB
% OF MAX PREDICTED HR: 100 %
ANION GAP SERPL CALC-SCNC: 7 MMOL/L (ref 0–18)
BUN BLD-MCNC: 30 MG/DL (ref 7–18)
BUN/CREAT SERPL: 26.3 (ref 10–20)
CALCIUM BLD-MCNC: 8.7 MG/DL (ref 8.5–10.1)
CHLORIDE SERPL-SCNC: 103 MMOL/L (ref 98–112)
CHOLEST SERPL-MCNC: 112 MG/DL (ref ?–200)
CO2 SERPL-SCNC: 25 MMOL/L (ref 21–32)
CREAT BLD-MCNC: 1.14 MG/DL
DEPRECATED RDW RBC AUTO: 45.9 FL (ref 35.1–46.3)
ERYTHROCYTE [DISTWIDTH] IN BLOOD BY AUTOMATED COUNT: 12.8 % (ref 11–15)
GFR SERPLBLD BASED ON 1.73 SQ M-ARVRAT: 67 ML/MIN/1.73M2 (ref 60–?)
GLUCOSE BLD-MCNC: 116 MG/DL (ref 70–99)
HCT VFR BLD AUTO: 30.7 %
HDLC SERPL-MCNC: 37 MG/DL (ref 40–59)
HGB BLD-MCNC: 10.1 G/DL
LDLC SERPL CALC-MCNC: 53 MG/DL (ref ?–100)
MAX DIASTOLIC BP: 56 MMHG
MAX HEART RATE: 109 BPM
MAX PREDICTED HEART RATE: 144 BPM
MAX SYSTOLIC BP: 125 MMHG
MAX WORK LOAD: 10
MCH RBC QN AUTO: 32 PG (ref 26–34)
MCHC RBC AUTO-ENTMCNC: 32.9 G/DL (ref 31–37)
MCV RBC AUTO: 97.2 FL
NONHDLC SERPL-MCNC: 75 MG/DL (ref ?–130)
OSMOLALITY SERPL CALC.SUM OF ELEC: 287 MOSM/KG (ref 275–295)
PLATELET # BLD AUTO: 360 10(3)UL (ref 150–450)
POTASSIUM SERPL-SCNC: 3.9 MMOL/L (ref 3.5–5.1)
RBC # BLD AUTO: 3.16 X10(6)UL
SODIUM SERPL-SCNC: 135 MMOL/L (ref 136–145)
TRIGL SERPL-MCNC: 124 MG/DL (ref 30–149)
TROPONIN I HIGH SENSITIVITY: 7 NG/L
VLDLC SERPL CALC-MCNC: 18 MG/DL (ref 0–30)
WBC # BLD AUTO: 9.7 X10(3) UL (ref 4–11)

## 2022-12-09 PROCEDURE — 93017 CV STRESS TEST TRACING ONLY: CPT | Performed by: INTERNAL MEDICINE

## 2022-12-09 PROCEDURE — 78452 HT MUSCLE IMAGE SPECT MULT: CPT | Performed by: INTERNAL MEDICINE

## 2022-12-09 PROCEDURE — 99226 SUBSEQUENT OBSERVATION CARE: CPT | Performed by: HOSPITALIST

## 2022-12-09 RX ORDER — POTASSIUM CHLORIDE 20 MEQ/1
40 TABLET, EXTENDED RELEASE ORAL ONCE
Status: COMPLETED | OUTPATIENT
Start: 2022-12-09 | End: 2022-12-09

## 2022-12-09 NOTE — CM/SW NOTE
SW attempted to meet w/ pt in his room 2x this AM and early afternoon - pt is out of room for a stress test at this time. SW briefly consulted w/ RN Saray Encarnacion. Per RN, pt stating he has having trouble w/ his ex-wife and is unsure about living arrangements. ED CM [facility] sent [facility] referrals overnight. SW requested PT/OT consults be ordered this AM during RN rounds. SW to attempt to meet w/ pt again prior to end of day as schedule permits.       Arun Carroll, MSW, [address] [facility]

## 2022-12-09 NOTE — CM/SW NOTE
12/09/22 1300   CM/SW Referral Data   Referral Source Social Work (self-referral)   Reason for Referral Discharge planning   Informant   (ex wife Yeimi Kenyon)   Pertinent Medical Hx   Does patient have an established PCP? Yes  (Fabi Moreland)   Patient Info   Patient's [address]   Patient lives with Spouse/Significant other  (ex wife)   Patient Status Prior to Admission   Independent with ADLs and Mobility No   Pt. requires assistance with Housework;Driving; Ambulating; Bathing   Services in place prior to admission DME/Supplies at home   Type of DME/Supplies Standard Walker; Shower Chair   Discharge Needs   Anticipated D/C needs Subacute rehab   Services Requested   PASRR Level 1 Submitted Yes   Choice of Post-Acute Provider   Informed patient of right to choose their preferred provider Yes   List of appropriate post-acute services provided to patient/family with quality data Yes   Patient/family choice pending choice   Information given to Spouse/Significant other  (Ex-wife)     01:35PM  SW received call from pt's ex-wife Yeimi Kenyon. Above assessment completed. Yeimi Toneyh confirmed pt was Dc'd from [facility] on 10/24/22 to [address] [place] [address]. Pt returned home w/ her on Friday 12/2/22. Pt was in [facility] approx 39 days. Yeimi Kenyon stated she had pt return home w/ the idea that pt would improve w/ [facility] PT/OT but that is not the case. Per Yeimi Kenyon, pt was not very willing to work w/ PT/OT at home. Yeimi Kenyon informed SW that she can NOT take care of him any longer. Yeimi Kenyon also informed SW that she has researched pt going to Assisted Living but until their home is sold, he will not have enough money for a down payment. Pt's ex-wife also stating that she feels he might need Long Term Care instead. SW discussed [facility] w/ transition to LTC. SW explained [facility]NA referral was sent and list was available for review.  SW also explained that chosen [facility] may have LTC available when pt is ready to transition and if not, they will be able to assist in finding a LTC facility. Raymond Morrissey expressed understanding and is agreeable. SHELLY emailed [facility] list to Raymond Morrissey via email: [email or web address]. SHELLY discussed pt might be medically cleared in the next 1-2 days. Raymond Morrissey acknowledged and confirmed to have [facility] choice. SHELLY provided Raymond Morrissey w/ direct phone # for f/up.    01:50PM  SW met w/ pt in his room and discussed [facility] w/ possible LTC transition. SHELLY confirmed pt is agreeable to ex-wife Raymondcherelle Morrissey helping w/ decisions. SHELLY received call from Raymond Morrissey while w/ pt and placed on speaker phone. Pt and Raymond Morrissey reviewed list together. SW addressed/answered all questions. Raymond Morrissey and pt have both chosen [id number] [facility] for placement at DC. [address] [facility] has been reserved in Aidin. Need for DC:  1. PT/OT evals & recs  2. Rapid COVID    PLAN: [address] [facility] w/ likely transition to LTC - pending above & med clear    SW/CM to remain available for support and/or discharge planning.          Laure Bustos, MSW, [address]

## 2022-12-09 NOTE — PLAN OF CARE
Patient: Pat DAVIS/Ox4, RA, no complaints of chest pain    Plan: stress test today  Problem: Patient Centered Care  Goal: Patient preferences are identified and integrated in the patient's plan of care  Description: Interventions:  - What would you like us to know as we care for you? Hx of anxiety and CABG  - Provide timely, complete, and accurate information to patient/family  - Incorporate patient and family knowledge, values, beliefs, and cultural backgrounds into the planning and delivery of care  - Encourage patient/family to participate in care and decision-making at the level they choose  - Honor patient and family perspectives and choices  12/9/2022 0801 by Jocelyn Wilkerson  Outcome: Progressing  12/9/2022 0505 by Jocelyn Wilkerson  Outcome: Progressing  12/9/2022 0505 by Jocelyn Wilkerson  Outcome: Progressing     Problem: SAFETY ADULT - FALL  Goal: Free from fall injury  Description: INTERVENTIONS:  - Assess pt frequently for physical needs  - Identify cognitive and physical deficits and behaviors that affect risk of falls.   - [place] fall precautions as indicated by assessment.  - Educate pt/family on patient safety including physical limitations  - Instruct pt to call for assistance with activity based on assessment  - Modify environment to reduce risk of injury  - Provide assistive devices as appropriate  - Consider OT/PT consult to assist with strengthening/mobility  - Encourage toileting schedule  12/9/2022 0801 by Jocelyn Wilkerson  Outcome: Progressing  12/9/2022 0505 by Jocelyn Wilkerson  Outcome: Progressing  12/9/2022 0505 by Jocelyn Wilkerson  Outcome: Progressing     Problem: CARDIOVASCULAR - ADULT  Goal: Maintains optimal cardiac output and hemodynamic stability  Description: INTERVENTIONS:  - Monitor vital signs, rhythm, and trends  - Monitor for bleeding, hypotension and signs of decreased cardiac output  - Evaluate effectiveness of vasoactive medications to optimize hemodynamic stability  - Monitor arterial and/or venous puncture sites for bleeding and/or hematoma  - Assess quality of pulses, skin color and temperature  - Assess for signs of decreased coronary artery perfusion - ex.  Angina  - Evaluate fluid balance, assess for edema, trend weights  12/9/2022 0801 by Aurora Kapadia  Outcome: Progressing  12/9/2022 0505 by Aurora Kapadia  Outcome: Progressing  12/9/2022 0505 by Aurora Kapadia  Outcome: Progressing  Goal: Absence of cardiac arrhythmias or at baseline  Description: INTERVENTIONS:  - Continuous cardiac monitoring, monitor vital signs, obtain 12 lead EKG if indicated  - Evaluate effectiveness of antiarrhythmic and heart rate control medications as ordered  - Initiate emergency measures for life threatening arrhythmias  - Monitor electrolytes and administer replacement therapy as ordered  12/9/2022 0801 by Aurora Kapadia  Outcome: Progressing  12/9/2022 0505 by Aurora Kapadia  Outcome: Progressing  12/9/2022 0505 by Aurora Kapadia  Outcome: Progressing

## 2022-12-09 NOTE — PLAN OF CARE
Patient: Jocelyne Diehl    A/O x4, RA, 2 assist with walker, denies pain, call light in reach    Plan: stress test today    Problem: Patient Centered Care  Goal: Patient preferences are identified and integrated in the patient's plan of care  Description: Interventions:  - What would you like us to know as we care for you? Hx of anxiety and CABG  - Provide timely, complete, and accurate information to patient/family  - Incorporate patient and family knowledge, values, beliefs, and cultural backgrounds into the planning and delivery of care  - Encourage patient/family to participate in care and decision-making at the level they choose  - Honor patient and family perspectives and choices  12/9/2022 0505 by Leah Jacobson  Outcome: Progressing  12/9/2022 0505 by Leah Jacobson  Outcome: Progressing     Problem: Patient/Family Goals  Goal: Patient/Family Long Term Goal  Description: Patient's Long Term Goal: Go home    Interventions:  -   - See additional Care Plan goals for specific interventions  12/9/2022 0505 by Leah Jacobson  Outcome: Progressing  12/9/2022 0505 by Leah Jacobson  Outcome: Progressing  Goal: Patient/Family Short Term Goal  Description: Patient's Short Term Goal: ***    Interventions:   - ***  - See additional Care Plan goals for specific interventions  12/9/2022 0505 by Leah Jacobson  Outcome: Progressing  12/9/2022 0505 by Leah Jacobson  Outcome: Progressing     Problem: SAFETY ADULT - FALL  Goal: Free from fall injury  Description: INTERVENTIONS:  - Assess pt frequently for physical needs  - Identify cognitive and physical deficits and behaviors that affect risk of falls.   - [place] fall precautions as indicated by assessment.  - Educate pt/family on patient safety including physical limitations  - Instruct pt to call for assistance with activity based on assessment  - Modify environment to reduce risk of injury  - Provide assistive devices as appropriate  - Consider OT/PT consult to assist with strengthening/mobility  - Encourage toileting schedule  12/9/2022 0505 by Kayli Barakat  Outcome: Progressing  12/9/2022 0505 by Kayli Barakat  Outcome: Progressing     Problem: CARDIOVASCULAR - ADULT  Goal: Maintains optimal cardiac output and hemodynamic stability  Description: INTERVENTIONS:  - Monitor vital signs, rhythm, and trends  - Monitor for bleeding, hypotension and signs of decreased cardiac output  - Evaluate effectiveness of vasoactive medications to optimize hemodynamic stability  - Monitor arterial and/or venous puncture sites for bleeding and/or hematoma  - Assess quality of pulses, skin color and temperature  - Assess for signs of decreased coronary artery perfusion - ex.  Angina  - Evaluate fluid balance, assess for edema, trend weights  12/9/2022 0505 by Kayli Barakat  Outcome: Progressing  12/9/2022 0505 by Kayli Barakat  Outcome: Progressing  Goal: Absence of cardiac arrhythmias or at baseline  Description: INTERVENTIONS:  - Continuous cardiac monitoring, monitor vital signs, obtain 12 lead EKG if indicated  - Evaluate effectiveness of antiarrhythmic and heart rate control medications as ordered  - Initiate emergency measures for life threatening arrhythmias  - Monitor electrolytes and administer replacement therapy as ordered  12/9/2022 0505 by Kayli Barakat  Outcome: Progressing  12/9/2022 0505 by Kayli Barakat  Outcome: Progressing

## 2022-12-09 NOTE — PLAN OF CARE
Patient walked to the bathroom. Patient is alert and oriented times 3/4, room air, 1 assist with walker. Electrolytes replaced. Plan for [facility]. Problem: Patient Centered Care  Goal: Patient preferences are identified and integrated in the patient's plan of care  Description: Interventions:  - What would you like us to know as we care for you? Hx of anxiety and CABG  - Provide timely, complete, and accurate information to patient/family  - Incorporate patient and family knowledge, values, beliefs, and cultural backgrounds into the planning and delivery of care  - Encourage patient/family to participate in care and decision-making at the level they choose  - Honor patient and family perspectives and choices  Outcome: Progressing     Problem: Patient/Family Goals  Goal: Patient/Family Long Term Goal  Description: Patient's Long Term Goal: Go home    Interventions:  -Follow doctors orders  - Follow medical regimen  - See additional Care Plan goals for specific interventions  Outcome: Progressing  Goal: Patient/Family Short Term Goal  Description: Patient's Short Term Goal: To sit in the chair    Interventions:   - Work with PT/OT  -Exercises in bed  - See additional Care Plan goals for specific interventions  Outcome: Progressing     Problem: SAFETY ADULT - FALL  Goal: Free from fall injury  Description: INTERVENTIONS:  - Assess pt frequently for physical needs  - Identify cognitive and physical deficits and behaviors that affect risk of falls.   - [place] fall precautions as indicated by assessment.  - Educate pt/family on patient safety including physical limitations  - Instruct pt to call for assistance with activity based on assessment  - Modify environment to reduce risk of injury  - Provide assistive devices as appropriate  - Consider OT/PT consult to assist with strengthening/mobility  - Encourage toileting schedule  Outcome: Progressing     Problem: CARDIOVASCULAR - ADULT  Goal: Maintains optimal cardiac output and hemodynamic stability  Description: INTERVENTIONS:  - Monitor vital signs, rhythm, and trends  - Monitor for bleeding, hypotension and signs of decreased cardiac output  - Evaluate effectiveness of vasoactive medications to optimize hemodynamic stability  - Monitor arterial and/or venous puncture sites for bleeding and/or hematoma  - Assess quality of pulses, skin color and temperature  - Assess for signs of decreased coronary artery perfusion - ex.  Angina  - Evaluate fluid balance, assess for edema, trend weights  Outcome: Progressing  Goal: Absence of cardiac arrhythmias or at baseline  Description: INTERVENTIONS:  - Continuous cardiac monitoring, monitor vital signs, obtain 12 lead EKG if indicated  - Evaluate effectiveness of antiarrhythmic and heart rate control medications as ordered  - Initiate emergency measures for life threatening arrhythmias  - Monitor electrolytes and administer replacement therapy as ordered  Outcome: Progressing

## 2022-12-09 NOTE — OCCUPATIONAL THERAPY NOTE
OT order was received, chart review was completed, RN cleared pt to participate in OT session; however, pt declined. Pt just received lunch tray, stated that pt was \"starved and really wanted to eat\". This therapist will re-attempt as pt is able and schedule permits.       [employer], OTR/L  [address]

## 2022-12-10 VITALS
RESPIRATION RATE: 18 BRPM | WEIGHT: 134 LBS | DIASTOLIC BLOOD PRESSURE: 71 MMHG | SYSTOLIC BLOOD PRESSURE: 122 MMHG | OXYGEN SATURATION: 96 % | TEMPERATURE: 98 F | HEART RATE: 74 BPM | BODY MASS INDEX: 19.18 KG/M2 | HEIGHT: 70 IN

## 2022-12-10 LAB
POTASSIUM SERPL-SCNC: 3.9 MMOL/L (ref 3.5–5.1)
SARS-COV-2 RNA RESP QL NAA+PROBE: NOT DETECTED

## 2022-12-10 PROCEDURE — 99217 OBSERVATION CARE DISCHARGE: CPT | Performed by: HOSPITALIST

## 2022-12-10 RX ORDER — MELATONIN
1000 DAILY
Status: DISCONTINUED | OUTPATIENT
Start: 2022-12-10 | End: 2022-12-10

## 2022-12-10 RX ORDER — ASPIRIN 325 MG
325 TABLET ORAL DAILY
Refills: 0 | Status: SHIPPED | COMMUNITY
Start: 2022-12-11

## 2022-12-10 RX ORDER — MELATONIN
100 DAILY
Status: DISCONTINUED | OUTPATIENT
Start: 2022-12-10 | End: 2022-12-10

## 2022-12-10 RX ORDER — NITROGLYCERIN 0.4 MG/1
0.4 TABLET SUBLINGUAL EVERY 5 MIN PRN
Refills: 0 | Status: SHIPPED | COMMUNITY
Start: 2022-12-10

## 2022-12-10 RX ORDER — CHOLECALCIFEROL (VITAMIN D3) 25 MCG
1000 TABLET ORAL DAILY
Refills: 0 | Status: SHIPPED | COMMUNITY
Start: 2022-12-10

## 2022-12-10 RX ORDER — MELATONIN
100 DAILY
Refills: 0 | Status: SHIPPED | COMMUNITY
Start: 2022-12-10

## 2022-12-10 RX ORDER — UBIDECARENONE 75 MG
100 CAPSULE ORAL DAILY
Status: DISCONTINUED | OUTPATIENT
Start: 2022-12-10 | End: 2022-12-10

## 2022-12-10 NOTE — PLAN OF CARE
Patient alert and oriented x 4. Patient confused at times. Patient's vitals stable on room air. Patient denies pain. Patient cleared for discharge to [facility]. AVS discussed in detail. Patient had no further questions. Problem: Patient Centered Care  Goal: Patient preferences are identified and integrated in the patient's plan of care  Description: Interventions:  - What would you like us to know as we care for you? Hx of anxiety and CABG  - Provide timely, complete, and accurate information to patient/family  - Incorporate patient and family knowledge, values, beliefs, and cultural backgrounds into the planning and delivery of care  - Encourage patient/family to participate in care and decision-making at the level they choose  - Honor patient and family perspectives and choices  Outcome: Adequate for Discharge     Problem: Patient/Family Goals  Goal: Patient/Family Long Term Goal  Description: Patient's Long Term Goal: Go home    Interventions:  -Follow doctors orders  - Follow medical regimen  - See additional Care Plan goals for specific interventions  Outcome: Adequate for Discharge  Goal: Patient/Family Short Term Goal  Description: Patient's Short Term Goal: To sit in the chair    Interventions:   - Work with PT/OT  -Exercises in bed  - See additional Care Plan goals for specific interventions  Outcome: Adequate for Discharge     Problem: SAFETY ADULT - FALL  Goal: Free from fall injury  Description: INTERVENTIONS:  - Assess pt frequently for physical needs  - Identify cognitive and physical deficits and behaviors that affect risk of falls.   - [place] fall precautions as indicated by assessment.  - Educate pt/family on patient safety including physical limitations  - Instruct pt to call for assistance with activity based on assessment  - Modify environment to reduce risk of injury  - Provide assistive devices as appropriate  - Consider OT/PT consult to assist with strengthening/mobility  - Encourage toileting schedule  Outcome: Adequate for Discharge     Problem: CARDIOVASCULAR - ADULT  Goal: Maintains optimal cardiac output and hemodynamic stability  Description: INTERVENTIONS:  - Monitor vital signs, rhythm, and trends  - Monitor for bleeding, hypotension and signs of decreased cardiac output  - Evaluate effectiveness of vasoactive medications to optimize hemodynamic stability  - Monitor arterial and/or venous puncture sites for bleeding and/or hematoma  - Assess quality of pulses, skin color and temperature  - Assess for signs of decreased coronary artery perfusion - ex.  Angina  - Evaluate fluid balance, assess for edema, trend weights  Outcome: Adequate for Discharge  Goal: Absence of cardiac arrhythmias or at baseline  Description: INTERVENTIONS:  - Continuous cardiac monitoring, monitor vital signs, obtain 12 lead EKG if indicated  - Evaluate effectiveness of antiarrhythmic and heart rate control medications as ordered  - Initiate emergency measures for life threatening arrhythmias  - Monitor electrolytes and administer replacement therapy as ordered  Outcome: Adequate for Discharge     Problem: PAIN - ADULT  Goal: Verbalizes/displays adequate comfort level or patient's stated pain goal  Description: INTERVENTIONS:  - Encourage pt to monitor pain and request assistance  - Assess pain using appropriate pain scale  - Administer analgesics based on type and severity of pain and evaluate response  - Implement non-pharmacological measures as appropriate and evaluate response  - Consider cultural and social influences on pain and pain management  - Manage/alleviate anxiety  - Utilize distraction and/or relaxation techniques  - Monitor for opioid side effects  - Notify MD/LIP if interventions unsuccessful or patient reports new pain  - Anticipate increased pain with activity and pre-medicate as appropriate  Outcome: Adequate for Discharge

## 2022-12-10 NOTE — CM/SW NOTE
12/10/22 1335   Discharge disposition   Expected discharge disposition [address]   Discharge transportation [address] [facility]     Pt cleared for DC. SW confirmed w/ Ember Reveles from [address] [place] [address], they will be ready for pt at 1600 (pt request). They requested COVID test prior to DC - informed RN, ordered for today 12/10. CMS waiver NOT needed as pt was recently dc'd from [facility] 12/2. [address] [facility] arranged for 1600 pickup to [address] [place] [address]. PCS form completed.      RN to call report to [address] [place] [address] at [address] [place], [place] - [id number]   (12/10/22 only, [id number])

## 2022-12-10 NOTE — PLAN OF CARE
Patient alert and oriented, breathing comfortably on room air. Ambulating with one assist and a walker. Chronic finley maintained. No complaints of pain. Stress test results pending. Discharge to [facility] when cleared.     Problem: PAIN - ADULT  Goal: Verbalizes/displays adequate comfort level or patient's stated pain goal  Description: INTERVENTIONS:  - Encourage pt to monitor pain and request assistance  - Assess pain using appropriate pain scale  - Administer analgesics based on type and severity of pain and evaluate response  - Implement non-pharmacological measures as appropriate and evaluate response  - Consider cultural and social influences on pain and pain management  - Manage/alleviate anxiety  - Utilize distraction and/or relaxation techniques  - Monitor for opioid side effects  - Notify MD/LIP if interventions unsuccessful or patient reports new pain  - Anticipate increased pain with activity and pre-medicate as appropriate  Outcome: Progressing

## 2022-12-30 NOTE — TELEPHONE ENCOUNTER
[employer] message sent. .      From: Geoff Durbin  To: Josy Batres DO  Sent: 1/27/2020 11:24 AM CST  Subject: Non-Urgent Medical Question    I will not be able to schedule and appointment with Dr Aldo Pelletier until June.   I do not have any urgent needs, b Identified pt with two pt identifiers(name and ). Reviewed record in preparation for visit and have obtained necessary documentation. Karma Hassan presents today for   Chief Complaint   Patient presents with    Follow-up       Pt denied  DIZZINESS, SOB, CHEST PAIN/ PRESSURE, FATIGUE/WEAKNESS, HEADACHES, SWELLING. Karma Hassan preferred language for health care discussion is english/other. Personal Protective Equipment:   Personal Protective Equipment was used including: mask-surgical and hands-gloves. Patient was placed on no precaution(s). Patient was masked. Precautions:   Patient currently on None  Patient currently roomed with door closed. Is someone accompanying this pt? no    Is the patient using any DME equipment during [address]? no    Depression Screening:  3 most recent PHQ Screens 2022   Little interest or pleasure in doing things Not at all   Feeling down, depressed, irritable, or hopeless Not at all   Total Score PHQ 2 0       Learning Assessment:  Learning Assessment 2015   PRIMARY LEARNER Patient   HIGHEST LEVEL OF EDUCATION - PRIMARY LEARNER  4 YEARS OF COLLEGE   BARRIERS PRIMARY LEARNER NONE   CO-LEARNER CAREGIVER No   PRIMARY LANGUAGE ENGLISH   LEARNER PREFERENCE PRIMARY READING   ANSWERED BY self   RELATIONSHIP SELF       Abuse Screening:  Abuse Screening Questionnaire 2021   Do you ever feel afraid of your partner? N   Are you in a relationship with someone who physically or mentally threatens you? N   Is it safe for you to go home? Y       Fall Risk  Fall Risk Assessment, last 12 mths 2021   Able to walk? Yes   Fall in past 12 months? 0   Do you feel unsteady? 0   Are you worried about falling 0       Pt currently taking Anticoagulant therapy? no  Pt currently taking Antiplatelet therapy? yes    Coordination of Care:  1. Have you been to the ER, urgent care clinic since your last visit? Hospitalized since your last visit? no    2.  Have you seen or consulted any other health care providers outside of the [address] [place] [address] since your last visit? Include any pap smears or colon screening. [facility]      Please see Red banners under Allergies and Med Rec to remove outside inquires. All correct information has been verified with patient and added to chart.      Medication's patient's would liked removed has been marked not taking to be removed per Verbal order and read back per Darryl Soares MD

## 2022-12-31 NOTE — PLAN OF CARE
Problem: Restraint, Behavioral (Acute Care)  Goal: Absence of Harm or Injury  Intervention: Implement Least Restrictive Safety Strategies  Recent Flowsheet Documentation  Taken 12/31/2022 0415 by Jasmin Najera, RN  Medical Device Protection:   IV pole/bag removed from visual field   tubing secured  Less Restrictive Alternative:   1:1 observation maintained   bed alarm in use   medication offered   sensory stimulation limited   safety enhancements provided   surveillance provided  De-Escalation Techniques:   increased round frequency   medication administered   medication offered   reoriented   stimulation decreased   verbally redirected  Diversional Activities: (pt sleeping tv off at this time) other (see comments)  Taken 12/31/2022 0323 by Jasmin Najera, RN  Medical Device Protection:   IV pole/bag removed from visual field   tubing secured  Less Restrictive Alternative:   1:1 observation maintained   bed alarm in use   family presence promoted   medication offered   safety enhancements provided   surveillance provided   sensory stimulation limited   environment adjusted  De-Escalation Techniques:   increased round frequency   medication administered   medication offered   quiet time facilitated   reoriented   stimulation decreased   verbally redirected  Diversional Activities: television  Taken 12/31/2022 0236 by Jasmin Najera, RN  Medical Device Protection:   IV pole/bag removed from visual field   tubing secured  Less Restrictive Alternative:   1:1 observation maintained   bed alarm in use   family presence promoted   safety enhancements provided   surveillance provided  De-Escalation Techniques: (prn ativan given for agitation restlessness)   medication administered   increased round frequency   reoriented   stimulation decreased   verbally redirected  Diversional Activities: (pt agitated and restless -diversion not tolerated)   television   other (see comments)  Taken 12/31/2022 0200 by  Problem: Patient Centered Care  Goal: Patient preferences are identified and integrated in the patient's plan of care  Description  Interventions:  - What would you like us to know as we care for you?  I have been experiencing a weak stream of urine and \ Jasmin Najera RN  Medical Device Protection:   IV pole/bag removed from visual field   tubing secured  Less Restrictive Alternative:   1:1 observation maintained   bed alarm in use   emotional support provided   family presence promoted   medication offered   safety enhancements provided   sensory stimulation limited   surveillance provided  De-Escalation Techniques:   appropriate behavior reinforced   diversional activity encouraged   increased round frequency   quiet time facilitated   reoriented   stimulation decreased   verbally redirected  Diversional Activities: television  Taken 12/31/2022 0031 by Jasmin Najera RN  Medical Device Protection:   IV pole/bag removed from visual field   tubing secured  Less Restrictive Alternative: 1:1 observation maintained  De-Escalation Techniques:   appropriate behavior reinforced   diversional activity encouraged   family involvement requested   increased round frequency   medication administered   medication offered   reoriented   stimulation decreased   verbally redirected  Diversional Activities: television  Taken 12/31/2022 0001 by Jasmin Najera RN  Medical Device Protection:   IV pole/bag removed from visual field   tubing secured  Less Restrictive Alternative:   1:1 observation maintained   bed alarm in use   calming techniques promoted   emotional support provided   environment adjusted   family presence promoted   medication offered   positive reinforcement provided   security enhancements provided   sensory stimulation limited   surveillance provided  De-Escalation Techniques:   appropriate behavior reinforced   diversional activity encouraged   increased round frequency   medication administered   medication offered   reoriented   stimulation decreased   verbally redirected  Diversional Activities: television  Taken 12/31/2022 0000 by Jasmin Najera RN  Medical Device Protection:   IV pole/bag removed from visual field   tubing secured  Less  scan as needed  - Follow urinary retention protocol/standard of care  - Consider collaborating with pharmacy to review patient's medication profile  - Implement strategies to promote bladder emptying  Outcome: Progressing     Problem: METABOLIC/FLUID AND E Restrictive Alternative:   1:1 observation maintained   bed alarm in use   safety enhancements provided   surveillance provided  De-Escalation Techniques:   increased round frequency   quiet time facilitated   reoriented   stimulation decreased   verbally redirected  Diversional Activities: television  Taken 12/30/2022 2200 by Jasmin Najera RN  Medical Device Protection:   IV pole/bag removed from visual field   tubing secured  Less Restrictive Alternative:   1:1 observation maintained   bed alarm in use   family presence promoted   medication offered   environment adjusted   safety enhancements provided   sensory stimulation limited   surveillance provided  De-Escalation Techniques:   appropriate behavior reinforced   diversional activity encouraged   medication offered   increased round frequency   reoriented   verbally redirected   stimulation decreased  Diversional Activities: television  Taken 12/30/2022 2031 by Jasmin Najera RN  Medical Device Protection:   IV pole/bag removed from visual field   tubing secured  Less Restrictive Alternative:   1:1 observation maintained   bed alarm in use   family presence promoted   medication offered   positive reinforcement provided   safety enhancements provided   sensory stimulation limited   surveillance provided   calming techniques promoted   emotional support provided   environment adjusted  De-Escalation Techniques:   appropriate behavior reinforced   diversional activity encouraged   increased round frequency   medication administered   medication offered   reoriented   stimulation decreased   verbally redirected  Diversional Activities: television  Taken 12/30/2022 2030 by Jasmin Najera RN  Medical Device Protection:   IV pole/bag removed from visual field   tubing secured  Diversional Activities: (pt altered agitated diversion not possible) other (see comments)     Problem: Skin Injury Risk Increased  Goal: Skin Health and  will remain free from self-harm  Description  INTERVENTIONS:  - Apply the least restrictive restraint to prevent harm  - Notify patient and family of reasons restraints applied  - Assess for any contributing factors to confusion (electrolyte disturbances, Integrity  Intervention: Optimize Skin Protection  Recent Flowsheet Documentation  Taken 12/31/2022 0001 by Jasmin Najera RN  Pressure Reduction Techniques: (4pt restraints unable to place heel protectors pt weight shifts in bed and sitter at bedside to assist with weight shifting)   frequent weight shift encouraged   weight shift assistance provided   other (see comments)  Head of Bed (HOB) Positioning: HOB at 30 degrees  Pressure Reduction Devices:   positioning supports utilized   other (see comments)  Skin Protection:   adhesive use limited   tubing/devices free from skin contact  Taken 12/30/2022 2200 by Jasmin Najera RN  Head of Bed (HOB) Positioning: HOB at 20-30 degrees  Taken 12/30/2022 2030 by Jasmin Najera RN  Pressure Reduction Techniques:   frequent weight shift encouraged   weight shift assistance provided  Head of Bed (HOB) Positioning: HOB at 20-30 degrees  Pressure Reduction Devices: positioning supports utilized  Skin Protection:   adhesive use limited   incontinence pads utilized   pulse oximeter probe site changed   tubing/devices free from skin contact     Problem: Restraint, Nonbehavioral (Nonviolent)  Goal: Absence of Harm or Injury  Intervention: Protect Dignity, Rights, and Personal Wellbeing  Recent Flowsheet Documentation  Taken 12/31/2022 0001 by Jasmin Najera RN  Trust Relationship/Rapport: care explained     Problem: Restraint, Nonbehavioral (Nonviolent)  Goal: Absence of Harm or Injury  Intervention: Protect Skin and Joint Integrity  Recent Flowsheet Documentation  Taken 12/31/2022 0001 by Jasmin Najera RN  Body Position:   right   tilted   supine  Taken 12/30/2022 2200 by Jasmin Najera RN  Body Position:   left   tilted   weight shifting  Taken 12/30/2022 2030 by Jasmin Najera RN  Body Position: (pt able to wt shift in bed)   position changed independently   supine   weight shifting  Range of Motion: ROM (range of motion) performed   Goal  Outcome Evaluation:         Violent restraints discontinued at 0322 per Kallie ESCALANTE as discontinuation criteria met to d/c violent restraints and place in NON-VIOLENT soft wrist restraints. Pt safety and integrity maintained. Less restrictive alernatives were attempted but pt not tolerated and to prevent injury or harm to patient or staff restraints were continued until 12/31/22 @0322. Violent restraints discontinued and orders for Non-violent restraints initiated with continued sitter at bedside. Pt safety maintained. Will continue to monitor,.

## 2023-01-09 ENCOUNTER — TELEPHONE (OUTPATIENT)
Dept: SURGERY | Facility: CLINIC | Age: 78
End: 2023-01-09

## 2023-01-09 NOTE — TELEPHONE ENCOUNTER
Will route to clinical in basket as acute. Please advise.      Future Appointments   Date Time Provider Jenna Conway   1/19/2023 10:30 AM David John MD [facility]

## 2023-01-09 NOTE — TELEPHONE ENCOUNTER
Per wife pt is having discomfort with his catheter and feels it has been in way too long and asking if he can be seen sooner than his appointment scheduled 1/19.  Please advise

## 2023-01-10 NOTE — TELEPHONE ENCOUNTER
-LMTCB on VM with pt's wife greeting.  -I asked that she contact us ([phone]) for pt update as pt last OV 1/ 12/ 22 with RAVEN; pt was performing CIC. -Outcome pending.

## 2023-01-10 NOTE — TELEPHONE ENCOUNTER
-S/w wife Aurther Skill; pt identity verified with name & .  -Pt had OV with [facility] 2022; was performing CIC. -Upon investigation, she reports pt fell in 10/2022 causing compression fx (#7). -She \"thinks\" finley was last changed 10/18/22; currently pt c/o penile pain.  -Pt is in [address] @ [place]/ [phone]. I s/w [place] (sp?) who will check pt records for last finley change, and call us back. If pt does require finley change, to be completed by Home Health RN. -I called wife back & made her aware of the above information. She was told when pt [facility], [facility] would be assigned to him. -Encounter complete.

## 2023-01-19 ENCOUNTER — OFFICE VISIT (OUTPATIENT)
Dept: SURGERY | Facility: CLINIC | Age: 78
End: 2023-01-19

## 2023-01-19 ENCOUNTER — TELEPHONE (OUTPATIENT)
Dept: SURGERY | Facility: CLINIC | Age: 78
End: 2023-01-19

## 2023-01-19 VITALS — SYSTOLIC BLOOD PRESSURE: 167 MMHG | HEART RATE: 71 BPM | RESPIRATION RATE: 20 BRPM | DIASTOLIC BLOOD PRESSURE: 89 MMHG

## 2023-01-19 DIAGNOSIS — N31.2 ATONIC URINARY BLADDER: ICD-10-CM

## 2023-01-19 DIAGNOSIS — Z78.9 SELF-CATHETERIZES URINARY BLADDER: ICD-10-CM

## 2023-01-19 DIAGNOSIS — N36.5 URETHRAL FALSE PASSAGE: ICD-10-CM

## 2023-01-19 DIAGNOSIS — R33.9 URINARY RETENTION: Primary | ICD-10-CM

## 2023-01-19 PROCEDURE — 99213 OFFICE O/P EST LOW 20 MIN: CPT | Performed by: UROLOGY

## 2023-01-19 PROCEDURE — 52000 CYSTOURETHROSCOPY: CPT | Performed by: UROLOGY

## 2023-01-19 RX ORDER — CIPROFLOXACIN 500 MG/1
500 TABLET, FILM COATED ORAL ONCE
Status: DISCONTINUED | OUTPATIENT
Start: 2023-01-19 | End: 2023-01-19

## 2023-01-19 RX ORDER — SULFAMETHOXAZOLE AND TRIMETHOPRIM 800; 160 MG/1; MG/1
1 TABLET ORAL 2 TIMES DAILY
Qty: 14 TABLET | Refills: 0 | Status: SHIPPED | OUTPATIENT
Start: 2023-01-19 | End: 2023-01-19

## 2023-01-19 RX ORDER — PHENAZOPYRIDINE HYDROCHLORIDE 100 MG/1
100 TABLET, FILM COATED ORAL 3 TIMES DAILY PRN
Qty: 9 TABLET | Refills: 0 | Status: SHIPPED | OUTPATIENT
Start: 2023-01-19

## 2023-01-19 RX ORDER — SULFAMETHOXAZOLE AND TRIMETHOPRIM 800; 160 MG/1; MG/1
1 TABLET ORAL 2 TIMES DAILY
Qty: 14 TABLET | Refills: 0 | Status: SHIPPED | OUTPATIENT
Start: 2023-01-19 | End: 2023-01-26

## 2023-01-19 RX ORDER — SULFAMETHOXAZOLE AND TRIMETHOPRIM 800; 160 MG/1; MG/1
1 TABLET ORAL ONCE
Status: COMPLETED | OUTPATIENT
Start: 2023-01-19 | End: 2023-01-19

## 2023-01-19 RX ORDER — PHENAZOPYRIDINE HYDROCHLORIDE 100 MG/1
100 TABLET, FILM COATED ORAL 3 TIMES DAILY PRN
Qty: 9 TABLET | Refills: 0 | Status: SHIPPED | OUTPATIENT
Start: 2023-01-19 | End: 2023-01-19

## 2023-01-19 RX ADMIN — SULFAMETHOXAZOLE AND TRIMETHOPRIM 1 TABLET: 800; 160 TABLET ORAL at 13:24:00

## 2023-01-19 NOTE — PROGRESS NOTES
I was asked by ROSEMARY ERICA [facility] to teach pt CIC using a 16 FR coude tip cath 4 times daily indefinitely, for the [address] of his urinary retention. I gathered my supplies and went into the room and I introduced my self and verified his name and  and I explained the instructions ZH gave. Pt agreed to proceed. I first in detail explained what is involved in CIC and stressed the importance of performing this as close to sterile technique as possible. Pt performed CIC in the past but has had a finley cath in place for a long term. I asked pt to perform self cath so that I can determine if he is performing it properly. I showed him all of the cath in the sample bag from [employer] and told him that we will make sure that they have the proper script to send more supplies later. Pt could not remember which cath he used in the past but decided to try one with the small blue advancer. He was unable to manage it because the advancer was just sliding and not gripping the cath well enough. We then decided to try a red rubber using a package of lube. Pt needed many verbal cues about his technique and I had to stop him several times to correct him. Pt finally went to place the cath in his urinary meatus and he was only able to get about 3 inches in and he stated that he could not advance further. I tried to advance it myself and it felt as though I was hitting a wall. I told pt that I would need to inform [facility] as he may have a stricture there that we are encountering. I left the room to s/w RAVEN and I informed him of the issue and he gave verbal order to try a small er 14 FR coude tip cath instead. I gathered the supplies and went back to the room and I asked the pt to try the 14 and he encountered the same issue as well as myself when I tried to advance the cath.  I went to inform [facility] and he went into the room and decided to do an emergency cysto and discovered that pt had a false urethral passage and he had to place a guidewire and was able to get an 18 FR [place] tip cath placed. We attached a leg bag and secured the cath in a STAT lock at the Lt thigh and we gave pt 1 tab of Bactrim DS after we chacked that he had no allergies to it or any other ABX. Pt was also schd for a return visit with the N/V for voiding trial again and CIC teaching and also placed on ABX and also schd for a cysto.

## 2023-01-19 NOTE — TELEPHONE ENCOUNTER
I called pt ex wife, venessa and LM that RN visit for 1/26/23 has to be moved to 1/30/23 per Dr. Grover Cedillo because he wants the finley to stay in for 10 days. When pt ex  wife calls back please confirm.

## 2023-01-30 ENCOUNTER — PROCEDURE (OUTPATIENT)
Dept: SURGERY | Facility: CLINIC | Age: 78
End: 2023-01-30

## 2023-01-30 VITALS — SYSTOLIC BLOOD PRESSURE: 105 MMHG | HEART RATE: 80 BPM | DIASTOLIC BLOOD PRESSURE: 63 MMHG

## 2023-01-30 DIAGNOSIS — Z78.9 SELF-CATHETERIZES URINARY BLADDER: ICD-10-CM

## 2023-01-30 DIAGNOSIS — N36.5 URETHRAL FALSE PASSAGE: Primary | ICD-10-CM

## 2023-01-30 DIAGNOSIS — N31.2 ATONIC URINARY BLADDER: ICD-10-CM

## 2023-01-30 DIAGNOSIS — R33.9 URINARY RETENTION: ICD-10-CM

## 2023-01-30 DIAGNOSIS — T83.9XXA DIFFICULT FOLEY CATHETER PLACEMENT (HCC): ICD-10-CM

## 2023-01-30 NOTE — PROGRESS NOTES
-Pt presents with wife per w/c for finley removal/ review CIC teaching; identity verified with name & .  -Pt is extremely [place]; had 2 HA in the past & has lost both sets. -Before removing finley, I reviewed coude tip design for catheter options.  -Assisted onto exam table X2; draped for privacy to remove pants/ underwear; aspirate 9ml balloon prime & slowly remove discard catheter/ stat-lock/ tubing & bag.  -Pt reports back pain & unable to stand for duration of CIC teaching; he sat in w/c.    -He washed hands; opened lubricant onto blue pad; held catheter & lubricated tip; held penis with left hand then with right hand guided catheter thru urethra; able to insert catheter approx. 4\" when pt felt pain & unable to advance; this RN attempts unsuccessful also. -[address] notified of above situation. Necessary for physician to perform Cysto utilizing guidewire to reinsert 16Fr coude finley.  -10ml balloon prime; finley attached to tubing; secured to stat-lock applied to right upper inner thigh;velcro straps to afix legbag to right calf. -Assist X2 to redress & transfer to w/c.  -Per [facility], no ABX ordered; Cysto scheduled 2/15/23 with 9am arrival.  -Left with wife to return to [address].

## 2023-02-15 ENCOUNTER — PROCEDURE (OUTPATIENT)
Dept: SURGERY | Facility: CLINIC | Age: 78
End: 2023-02-15

## 2023-02-15 VITALS — DIASTOLIC BLOOD PRESSURE: 64 MMHG | HEART RATE: 72 BPM | SYSTOLIC BLOOD PRESSURE: 104 MMHG

## 2023-02-15 DIAGNOSIS — N36.5 URETHRAL FALSE PASSAGE: Primary | ICD-10-CM

## 2023-02-15 PROCEDURE — 52000 CYSTOURETHROSCOPY: CPT | Performed by: UROLOGY

## 2023-02-15 PROCEDURE — 99213 OFFICE O/P EST LOW 20 MIN: CPT | Performed by: UROLOGY

## 2023-02-15 RX ORDER — CIPROFLOXACIN 500 MG/1
500 TABLET, FILM COATED ORAL ONCE
Status: COMPLETED | OUTPATIENT
Start: 2023-02-15 | End: 2023-02-15

## 2023-02-15 RX ADMIN — CIPROFLOXACIN 500 MG: 500 TABLET, FILM COATED ORAL at 10:00:00

## 2023-02-15 NOTE — TELEPHONE ENCOUNTER
The primary encounter diagnosis was Recurrent major depressive disorder, in full remission (CMS/[facility]). Diagnoses of Attention deficit hyperactivity disorder (ADHD), predominantly inattentive type and Alcohol abuse, in remission were also pertinent to this visit.    Current symptoms: Feels Adderall XR 25mg is now working well for him. He feels free of depression. 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  Understands I will be cutting hours and wants to see doctor within this clinic.   Mental status: Patient is well oriented. There is no evidence of psychotic thinking.  Patient does not appear to be a danger to self or others at this time.  No extra pyramidal signs seen.  Side effects: None  Labs: None  Assessment: Responding to current medication   Plan: Continue current medication.  Will contact clinic to arrange for transfer to new doctor within the clinic.      This visit is being performed virtually via Video visit using [employer].  Clinical Location: Home  Patient's location Home and is physically present in  [facility] at the time of this visit.    Twenty five minutes were spent on this encounter   See TE from 7/17

## 2023-02-16 ENCOUNTER — TELEPHONE (OUTPATIENT)
Dept: SURGERY | Facility: CLINIC | Age: 78
End: 2023-02-16

## 2023-03-02 ENCOUNTER — TELEPHONE (OUTPATIENT)
Dept: SURGERY | Facility: CLINIC | Age: 78
End: 2023-03-02

## 2023-03-02 ENCOUNTER — PROCEDURE (OUTPATIENT)
Dept: SURGERY | Facility: CLINIC | Age: 78
End: 2023-03-02

## 2023-03-02 DIAGNOSIS — R33.9 URINARY RETENTION: ICD-10-CM

## 2023-03-02 DIAGNOSIS — N40.1 BENIGN PROSTATIC HYPERPLASIA WITH WEAK URINARY STREAM: ICD-10-CM

## 2023-03-02 DIAGNOSIS — Z51.81 MONITORING FOR ANTICOAGULANT USE: ICD-10-CM

## 2023-03-02 DIAGNOSIS — Z01.818 PREOP EXAMINATION: ICD-10-CM

## 2023-03-02 DIAGNOSIS — R39.12 BENIGN PROSTATIC HYPERPLASIA WITH WEAK URINARY STREAM: ICD-10-CM

## 2023-03-02 DIAGNOSIS — N36.5 URETHRAL FALSE PASSAGE: Primary | ICD-10-CM

## 2023-03-02 DIAGNOSIS — R39.89 OTHER SYMPTOMS AND SIGNS INVOLVING THE GENITOURINARY SYSTEM: ICD-10-CM

## 2023-03-02 DIAGNOSIS — N31.2 ATONIC URINARY BLADDER: ICD-10-CM

## 2023-03-02 DIAGNOSIS — Z79.01 MONITORING FOR ANTICOAGULANT USE: ICD-10-CM

## 2023-03-02 PROCEDURE — 52000 CYSTOURETHROSCOPY: CPT | Performed by: UROLOGY

## 2023-03-02 PROCEDURE — 99214 OFFICE O/P EST MOD 30 MIN: CPT | Performed by: UROLOGY

## 2023-03-02 RX ORDER — CIPROFLOXACIN 500 MG/1
500 TABLET, FILM COATED ORAL ONCE
Status: COMPLETED | OUTPATIENT
Start: 2023-03-02 | End: 2023-03-02

## 2023-03-02 RX ADMIN — CIPROFLOXACIN 500 MG: 500 TABLET, FILM COATED ORAL at 14:34:00

## 2023-03-02 NOTE — PROGRESS NOTES
Patient seen in office, scheduled Cystoscopy, Possible Suprapubic Catheter Insertion, Friday 03/24/2023, went over pre-op/lab instructions, all sent to patient' my chart, informed to please have labs done 7-10 days prior to scheduled surgery

## 2023-03-02 NOTE — TELEPHONE ENCOUNTER
Dear Viry Sanchez, this is to inform, patient is scheduled for Cystoscopy, Possible Suprapubic Catheter Insertion, Friday 03/24/2023, Isa Sterling' requesting pre-op medical clearance prior to scheduled surgery.   Thanks

## 2023-03-03 NOTE — TELEPHONE ENCOUNTER
He needs to get in for a preop physical with either myself or Nicole Aguirre in the near future as he has a urology procedure.

## 2023-03-08 ENCOUNTER — TELEPHONE (OUTPATIENT)
Dept: FAMILY MEDICINE CLINIC | Facility: CLINIC | Age: 78
End: 2023-03-08

## 2023-03-08 NOTE — TELEPHONE ENCOUNTER
[facility] SU[facility]ANNA Landin called to let Dr Burke Wright know she needs to recert patient for nursing home health as he is having a suprapubic catheter insertion done on 03/24/23

## 2023-03-14 ENCOUNTER — LAB REQUISITION (OUTPATIENT)
Dept: LAB | Facility: HOSPITAL | Age: 78
End: 2023-03-14
Payer: MEDICARE

## 2023-03-14 DIAGNOSIS — Z46.6 ENCOUNTER FOR FITTING AND ADJUSTMENT OF URINARY DEVICE: ICD-10-CM

## 2023-03-14 LAB
ANION GAP SERPL CALC-SCNC: 6 MMOL/L (ref 0–18)
APTT PPP: 29.6 SECONDS (ref 23.3–35.6)
BASOPHILS # BLD AUTO: 0.07 X10(3) UL (ref 0–0.2)
BASOPHILS NFR BLD AUTO: 0.9 %
BUN BLD-MCNC: 17 MG/DL (ref 7–18)
CALCIUM BLD-MCNC: 9.2 MG/DL (ref 8.5–10.1)
CHLORIDE SERPL-SCNC: 103 MMOL/L (ref 98–112)
CO2 SERPL-SCNC: 26 MMOL/L (ref 21–32)
CREAT BLD-MCNC: 1.25 MG/DL
EOSINOPHIL # BLD AUTO: 0.22 X10(3) UL (ref 0–0.7)
EOSINOPHIL NFR BLD AUTO: 2.8 %
ERYTHROCYTE [DISTWIDTH] IN BLOOD BY AUTOMATED COUNT: 12.1 %
GFR SERPLBLD BASED ON 1.73 SQ M-ARVRAT: 60 ML/MIN/1.73M2 (ref 60–?)
GLUCOSE BLD-MCNC: 168 MG/DL (ref 70–99)
HCT VFR BLD AUTO: 35.7 %
HGB BLD-MCNC: 12.2 G/DL
IMM GRANULOCYTES # BLD AUTO: 0.03 X10(3) UL (ref 0–1)
IMM GRANULOCYTES NFR BLD: 0.4 %
INR BLD: 0.98 (ref 0.85–1.16)
LYMPHOCYTES # BLD AUTO: 1.75 X10(3) UL (ref 1–4)
LYMPHOCYTES NFR BLD AUTO: 21.9 %
MCH RBC QN AUTO: 30 PG (ref 26–34)
MCHC RBC AUTO-ENTMCNC: 34.2 G/DL (ref 31–37)
MCV RBC AUTO: 87.9 FL
MONOCYTES # BLD AUTO: 0.5 X10(3) UL (ref 0.1–1)
MONOCYTES NFR BLD AUTO: 6.3 %
NEUTROPHILS # BLD AUTO: 5.43 X10 (3) UL (ref 1.5–7.7)
NEUTROPHILS # BLD AUTO: 5.43 X10(3) UL (ref 1.5–7.7)
NEUTROPHILS NFR BLD AUTO: 67.7 %
OSMOLALITY SERPL CALC.SUM OF ELEC: 285 MOSM/KG (ref 275–295)
PLATELET # BLD AUTO: 233 10(3)UL (ref 150–450)
POTASSIUM SERPL-SCNC: 3.7 MMOL/L (ref 3.5–5.1)
PROTHROMBIN TIME: 13 SECONDS (ref 11.6–14.8)
RBC # BLD AUTO: 4.06 X10(6)UL
SODIUM SERPL-SCNC: 135 MMOL/L (ref 136–145)
WBC # BLD AUTO: 8 X10(3) UL (ref 4–11)

## 2023-03-14 PROCEDURE — 85025 COMPLETE CBC W/AUTO DIFF WBC: CPT | Performed by: UROLOGY

## 2023-03-14 PROCEDURE — 80048 BASIC METABOLIC PNL TOTAL CA: CPT | Performed by: UROLOGY

## 2023-03-14 PROCEDURE — 85730 THROMBOPLASTIN TIME PARTIAL: CPT | Performed by: UROLOGY

## 2023-03-14 PROCEDURE — 85610 PROTHROMBIN TIME: CPT | Performed by: UROLOGY

## 2023-03-15 ENCOUNTER — EKG ENCOUNTER (OUTPATIENT)
Dept: LAB | Age: 78
End: 2023-03-15
Attending: NURSE PRACTITIONER
Payer: MEDICARE

## 2023-03-15 ENCOUNTER — TELEPHONE (OUTPATIENT)
Dept: FAMILY MEDICINE CLINIC | Facility: CLINIC | Age: 78
End: 2023-03-15

## 2023-03-15 ENCOUNTER — HOSPITAL ENCOUNTER (OUTPATIENT)
Dept: GENERAL RADIOLOGY | Age: 78
Discharge: HOME | End: 2023-03-15
Attending: NURSE PRACTITIONER
Payer: MEDICARE

## 2023-03-15 ENCOUNTER — OFFICE VISIT (OUTPATIENT)
Dept: FAMILY MEDICINE CLINIC | Facility: CLINIC | Age: 78
End: 2023-03-15

## 2023-03-15 VITALS
BODY MASS INDEX: 21.19 KG/M2 | HEIGHT: 70 IN | HEART RATE: 69 BPM | SYSTOLIC BLOOD PRESSURE: 134 MMHG | WEIGHT: 148 LBS | DIASTOLIC BLOOD PRESSURE: 78 MMHG

## 2023-03-15 DIAGNOSIS — R41.3 MEMORY LOSS: ICD-10-CM

## 2023-03-15 DIAGNOSIS — H61.23 BILATERAL IMPACTED CERUMEN: ICD-10-CM

## 2023-03-15 DIAGNOSIS — H90.3 SENSORINEURAL HEARING LOSS, BILATERAL: ICD-10-CM

## 2023-03-15 DIAGNOSIS — N40.1 BENIGN PROSTATIC HYPERPLASIA WITH WEAK URINARY STREAM: ICD-10-CM

## 2023-03-15 DIAGNOSIS — R73.9 HYPERGLYCEMIA: ICD-10-CM

## 2023-03-15 DIAGNOSIS — S22.000A COMPRESSION FRACTURE OF BODY OF THORACIC VERTEBRA (HCC): ICD-10-CM

## 2023-03-15 DIAGNOSIS — Z01.818 PRE-OPERATIVE GENERAL PHYSICAL EXAMINATION: ICD-10-CM

## 2023-03-15 DIAGNOSIS — Z00.00 WELL ADULT EXAM: Primary | ICD-10-CM

## 2023-03-15 DIAGNOSIS — H11.32 SUBCONJUNCTIVAL HEMORRHAGE OF LEFT EYE: ICD-10-CM

## 2023-03-15 DIAGNOSIS — N31.2 ATONIC URINARY BLADDER: ICD-10-CM

## 2023-03-15 DIAGNOSIS — R33.9 URINARY RETENTION: ICD-10-CM

## 2023-03-15 DIAGNOSIS — E55.9 VITAMIN D DEFICIENCY: ICD-10-CM

## 2023-03-15 DIAGNOSIS — E46 PROTEIN-CALORIE MALNUTRITION, UNSPECIFIED SEVERITY (HCC): ICD-10-CM

## 2023-03-15 DIAGNOSIS — Z11.1 SCREENING-PULMONARY TB: ICD-10-CM

## 2023-03-15 DIAGNOSIS — F10.20 ALCOHOL USE DISORDER, MODERATE, DEPENDENCE (HCC): ICD-10-CM

## 2023-03-15 DIAGNOSIS — M54.50 LUMBAR PAIN: ICD-10-CM

## 2023-03-15 DIAGNOSIS — F39 EPISODIC MOOD DISORDER (HCC): ICD-10-CM

## 2023-03-15 DIAGNOSIS — I10 ESSENTIAL HYPERTENSION: ICD-10-CM

## 2023-03-15 DIAGNOSIS — R26.9 GAIT DISTURBANCE: ICD-10-CM

## 2023-03-15 DIAGNOSIS — E11.9 TYPE 2 DIABETES MELLITUS WITHOUT COMPLICATION, WITHOUT LONG-TERM CURRENT USE OF INSULIN (HCC): ICD-10-CM

## 2023-03-15 DIAGNOSIS — R39.12 BENIGN PROSTATIC HYPERPLASIA WITH WEAK URINARY STREAM: ICD-10-CM

## 2023-03-15 DIAGNOSIS — Z95.1 HISTORY OF CORONARY ARTERY BYPASS GRAFT X 2: ICD-10-CM

## 2023-03-15 DIAGNOSIS — I25.10 ATHEROSCLEROSIS OF NATIVE CORONARY ARTERY OF NATIVE HEART WITHOUT ANGINA PECTORIS: ICD-10-CM

## 2023-03-15 DIAGNOSIS — E78.00 HYPERCHOLESTEREMIA: ICD-10-CM

## 2023-03-15 PROBLEM — N30.00 ACUTE CYSTITIS WITHOUT HEMATURIA: Status: RESOLVED | Noted: 2022-10-18 | Resolved: 2023-03-15

## 2023-03-15 PROBLEM — R07.9 CHEST PAIN: Status: RESOLVED | Noted: 2022-12-08 | Resolved: 2023-03-15

## 2023-03-15 PROBLEM — N28.9 ACUTE RENAL INSUFFICIENCY: Status: RESOLVED | Noted: 2019-05-06 | Resolved: 2023-03-15

## 2023-03-15 PROBLEM — Z78.9 INTERMITTENT SELF-CATHETERIZATION OF BLADDER: Status: RESOLVED | Noted: 2020-08-06 | Resolved: 2023-03-15

## 2023-03-15 PROBLEM — R14.0 ABDOMINAL DISTENSION: Status: RESOLVED | Noted: 2019-05-06 | Resolved: 2023-03-15

## 2023-03-15 PROBLEM — K40.90 RIGHT INGUINAL HERNIA: Status: RESOLVED | Noted: 2021-07-30 | Resolved: 2023-03-15

## 2023-03-15 PROCEDURE — 93010 ELECTROCARDIOGRAM REPORT: CPT | Performed by: STUDENT IN AN ORGANIZED HEALTH CARE EDUCATION/TRAINING PROGRAM

## 2023-03-15 PROCEDURE — 72110 X-RAY EXAM L-2 SPINE 4/>VWS: CPT | Performed by: NURSE PRACTITIONER

## 2023-03-15 PROCEDURE — 93005 ELECTROCARDIOGRAM TRACING: CPT

## 2023-03-15 NOTE — TELEPHONE ENCOUNTER
Spoke to Ynes Morrison RN from [place] [address] (name and  of patient verified). She is calling to request lab orders faxed to [phone]. Per chart review, patient had pre-op appointment today with Velia Centeno no new lab orders noted. Postponing to recheck for lab orders tomorrow. Triage RN, please check for lab orders and fax to number above. Thank you.

## 2023-03-16 PROBLEM — F10.939 ALCOHOL WITHDRAWAL SYNDROME WITH COMPLICATION (HCC): Status: RESOLVED | Noted: 2022-04-04 | Resolved: 2023-03-16

## 2023-03-16 PROBLEM — E11.9 TYPE 2 DIABETES MELLITUS WITHOUT COMPLICATION, WITHOUT LONG-TERM CURRENT USE OF INSULIN (HCC): Status: ACTIVE | Noted: 2023-03-16

## 2023-03-16 PROBLEM — Z11.1 SCREENING-PULMONARY TB: Status: ACTIVE | Noted: 2023-03-16

## 2023-03-16 PROBLEM — H11.32 SUBCONJUNCTIVAL HEMORRHAGE OF LEFT EYE: Status: ACTIVE | Noted: 2023-03-16

## 2023-03-16 LAB
ATRIAL RATE: 70 BPM
P-R INTERVAL: 110 MS
Q-T INTERVAL: 380 MS
QRS DURATION: 72 MS
QTC CALCULATION (BEZET): 410 MS
R AXIS: 33 DEGREES
T AXIS: -4 DEGREES
VENTRICULAR RATE: 70 BPM

## 2023-03-17 ENCOUNTER — TELEPHONE (OUTPATIENT)
Dept: FAMILY MEDICINE CLINIC | Facility: CLINIC | Age: 78
End: 2023-03-17

## 2023-03-17 ENCOUNTER — TELEPHONE (OUTPATIENT)
Dept: SURGERY | Facility: CLINIC | Age: 78
End: 2023-03-17

## 2023-03-17 PROBLEM — R26.9 GAIT DISTURBANCE: Status: ACTIVE | Noted: 2023-03-17

## 2023-03-17 PROBLEM — S22.000A COMPRESSION FRACTURE OF BODY OF THORACIC VERTEBRA (HCC): Status: ACTIVE | Noted: 2023-03-17

## 2023-03-17 NOTE — TELEPHONE ENCOUNTER
I s/w nurse Jerre Simmonds calling from [facility] to inform that she is being asked to collect a urine sample from pt's cath for his pre-op testing and was just speaking to LILIA DE DIOS the surgery schdlr. Who told her to take it from the catheter. She states that she usually changes out the finley cath when a urine sample is needed for a test. I asked why when all she has to do is clamp the finley cath for 15-20 min to allow urine to collect in the bladder and then clean the end of the finley cath with a couple alcohol swabs and then release the clamp and allow the retained urine to drain into a sterile urine cup making sure to not touch the cath on the inside of the cup. I explained that she could use a rubber band if she has no clamp and she could fold the cath at the bifurcation and apply the rubber band tightly. There was a long silence and she then stated fine and hung up.

## 2023-03-17 NOTE — ASSESSMENT & PLAN NOTE
Had kyphoplasty done  Lumbar spine xray completed  Encourage use of walker when ambulating to reduce risk of falls and other fractures

## 2023-03-17 NOTE — TELEPHONE ENCOUNTER
Please call pt and see if labs were drawn by home health nurse-we can't clear for sx until we get results.

## 2023-03-17 NOTE — ASSESSMENT & PLAN NOTE
Screening labs ordered  Discussed diet high in protein, nutrient dense foods  Tb testing  Pt has flu shot this year  Is due for shingrix vaccine-needs to get at pharmacy  Use walker when ambulating

## 2023-03-17 NOTE — ASSESSMENT & PLAN NOTE
Continue present management atorvastatin 80 mg nightly  Please aim to eat a diet high in fresh fruits and vegetables, lean protein sources, complex carbohydrates and limited processed and fast foods.

## 2023-03-17 NOTE — ASSESSMENT & PLAN NOTE
Is currently not on any medications  Actively craving alcohol    Would benefit from behavioral health intervention and possible naloxone-pt not willing to discuss today

## 2023-03-17 NOTE — ASSESSMENT & PLAN NOTE
Currently has bilat cerumen impaction  Will need to see if hearing improves after pt returns for ear irrigation

## 2023-03-17 NOTE — ASSESSMENT & PLAN NOTE
Pt is still very triggered by just talking about alcohol and was very angry when told that he should not be drinking at all  Does not see his drinking as a problem  Has been sober since Oct 6

## 2023-03-18 ENCOUNTER — LAB REQUISITION (OUTPATIENT)
Dept: LAB | Facility: HOSPITAL | Age: 78
End: 2023-03-18
Payer: MEDICARE

## 2023-03-18 DIAGNOSIS — Z46.6 ENCOUNTER FOR FITTING AND ADJUSTMENT OF URINARY DEVICE: ICD-10-CM

## 2023-03-18 LAB
BILIRUB UR QL STRIP.AUTO: NEGATIVE
COLOR UR AUTO: YELLOW
GLUCOSE UR STRIP.AUTO-MCNC: NEGATIVE MG/DL
KETONES UR STRIP.AUTO-MCNC: NEGATIVE MG/DL
NITRITE UR QL STRIP.AUTO: POSITIVE
PH UR STRIP.AUTO: 7 [PH] (ref 5–8)
PROT UR STRIP.AUTO-MCNC: 100 MG/DL
RBC #/AREA URNS AUTO: >10 /HPF
SP GR UR STRIP.AUTO: 1.01 (ref 1–1.03)
UROBILINOGEN UR STRIP.AUTO-MCNC: <2 MG/DL
WBC #/AREA URNS AUTO: >50 /HPF
WBC CLUMPS UR QL AUTO: PRESENT /HPF

## 2023-03-18 PROCEDURE — 87088 URINE BACTERIA CULTURE: CPT | Performed by: UROLOGY

## 2023-03-18 PROCEDURE — 87077 CULTURE AEROBIC IDENTIFY: CPT | Performed by: UROLOGY

## 2023-03-18 PROCEDURE — 87186 SC STD MICRODIL/AGAR DIL: CPT | Performed by: UROLOGY

## 2023-03-18 PROCEDURE — 81001 URINALYSIS AUTO W/SCOPE: CPT | Performed by: UROLOGY

## 2023-03-18 PROCEDURE — 87086 URINE CULTURE/COLONY COUNT: CPT | Performed by: UROLOGY

## 2023-03-20 ENCOUNTER — TELEPHONE (OUTPATIENT)
Dept: SURGERY | Facility: CLINIC | Age: 78
End: 2023-03-20

## 2023-03-20 ENCOUNTER — LAB REQUISITION (OUTPATIENT)
Dept: LAB | Facility: HOSPITAL | Age: 78
End: 2023-03-20
Payer: MEDICARE

## 2023-03-20 DIAGNOSIS — N39.0 RECURRENT UTI: Primary | ICD-10-CM

## 2023-03-20 DIAGNOSIS — E11.42 TYPE 2 DIABETES MELLITUS WITH DIABETIC POLYNEUROPATHY (HCC): ICD-10-CM

## 2023-03-20 DIAGNOSIS — I10 ESSENTIAL (PRIMARY) HYPERTENSION: ICD-10-CM

## 2023-03-20 LAB
CHOLEST SERPL-MCNC: 105 MG/DL (ref ?–200)
EST. AVERAGE GLUCOSE BLD GHB EST-MCNC: 146 MG/DL (ref 68–126)
HBA1C MFR BLD: 6.7 % (ref ?–5.7)
HDLC SERPL-MCNC: 50 MG/DL (ref 40–59)
LDLC SERPL CALC-MCNC: 35 MG/DL (ref ?–100)
NONHDLC SERPL-MCNC: 55 MG/DL (ref ?–130)
TRIGL SERPL-MCNC: 106 MG/DL (ref 30–149)
TSI SER-ACNC: 1.08 MIU/ML (ref 0.36–3.74)
VIT B12 SERPL-MCNC: 952 PG/ML (ref 193–986)
VIT D+METAB SERPL-MCNC: 27 NG/ML (ref 30–100)
VLDLC SERPL CALC-MCNC: 14 MG/DL (ref 0–30)

## 2023-03-20 PROCEDURE — 83036 HEMOGLOBIN GLYCOSYLATED A1C: CPT | Performed by: FAMILY MEDICINE

## 2023-03-20 PROCEDURE — 82607 VITAMIN B-12: CPT | Performed by: FAMILY MEDICINE

## 2023-03-20 PROCEDURE — 86480 TB TEST CELL IMMUN MEASURE: CPT | Performed by: FAMILY MEDICINE

## 2023-03-20 PROCEDURE — 82306 VITAMIN D 25 HYDROXY: CPT | Performed by: FAMILY MEDICINE

## 2023-03-20 PROCEDURE — 84443 ASSAY THYROID STIM HORMONE: CPT | Performed by: FAMILY MEDICINE

## 2023-03-20 PROCEDURE — 80061 LIPID PANEL: CPT | Performed by: FAMILY MEDICINE

## 2023-03-20 RX ORDER — SERTRALINE HYDROCHLORIDE 25 MG/1
25 TABLET, FILM COATED ORAL DAILY
COMMUNITY

## 2023-03-20 RX ORDER — SULFAMETHOXAZOLE AND TRIMETHOPRIM 800; 160 MG/1; MG/1
1 TABLET ORAL 2 TIMES DAILY
Qty: 14 TABLET | Refills: 0 | Status: SHIPPED
Start: 2023-03-20 | End: 2023-03-27

## 2023-03-20 RX ORDER — MELATONIN
3 NIGHTLY
COMMUNITY

## 2023-03-20 RX ORDER — NITROFURANTOIN 25; 75 MG/1; MG/1
100 CAPSULE ORAL 2 TIMES DAILY
Qty: 14 CAPSULE | Refills: 0 | Status: SHIPPED | OUTPATIENT
Start: 2023-03-20 | End: 2023-03-20

## 2023-03-20 RX ORDER — IBUPROFEN 200 MG
400 TABLET ORAL EVERY 6 HOURS PRN
COMMUNITY

## 2023-03-20 NOTE — TELEPHONE ENCOUNTER
- Per ZH, he spoke with pt, and pt agreed to still have the surgery on Friday as scheduled. -  requests that this RN contact pt RN Carmen Whitley) at pt's [facility] hospital to ensure that they will coordinate pt transport to and from the facility.   Charly Mart RN ensured this RN that she will confirm that pt ex-wife Ezra Begum) will be transporting pt to and from, or if not, she will arrange transport for the pt. -  informed that transport will be arranged/confirmed by Mauricio Erickson RN at [facility] hospital.  Nicole Anthony (surgical scheduling) informed that pt agreed to have his surgery on Friday.    - Encounter complete

## 2023-03-20 NOTE — TELEPHONE ENCOUNTER
- [facility] to [phone] on      ----- Message from Anil Marcus MD sent at 3/19/2023  4:19 PM CDT -----  Has upcoming surgery this Friday. Please have patient start Bactrim DS PO BID x 7 days to be started ASAP.

## 2023-03-20 NOTE — TELEPHONE ENCOUNTER
- S/w pt; identity verified with name and   - informed pt of urine culture showing that he has a UTI, and Dr. Zoya Rivero would like him to start Bactrim (per his result note below) today. And that I had sent the order for the antibiotic to his preferred pharmacy. - pt states that he is unable to p/u abx, as he is currently in the \"hospital\", and his wife is in [place] right now, and he doesn't know when she will be back. I clarified that pt is at [facility] in [address] states that he is being treated for \"heart\" pain, and that they are keeping him there indefinitely. I clarified that pt had chest pain, and with his past hx they kept him there for observation. Asked pt is he would be able to have someone get the abx for him to treat the infection prior to his surgery on Friday. Pt states that he will not be able to get the surgery on Friday. I asked him why he won't be able to have it, and he states because he doesn't have anyone to bring him, and he refuses to use the ambulance transfer as it is too painful, and \"too much work\". - Asked pt who his PCP is, and he stated it was Dr. Manpreet Patel. - Pt asked that I speak to his nurse if I wanted any further information re: what meds he was taking, as he was unsure. I promised pt that I would contact his caregiver and discuss next steps. - Also informed him that Dr. Zoya Rivero would probably like to speak to him sometime today, and asked that he keep his phone nearby and answer it if he called. Pt agreed, and promised to answer his call if he did try to contact him. - I called [facility] at [phone], and spoke with pt's nurse, Margarita Wadsworth. She informed me that she was unaware of any antibiotic that pt was taking for a UTI, but that she was who had drawn up the sample and sent it to the lab.   She asked if there is any result, and I informed her that Dr. Zoya Rivero had ordered pt to start on Bactrim -160, to be taken 2 x/day, for 7 days. And Deidra Richard RN agreed to enter the order and start pt on it today. - I also asked Deidra Richard if she had had any conversations with Mervin Shultzovidio re: surgery on Friday, as he was insisting that he would have to cancel. Deidra Richard RN states that she is unaware that he wanted it canceled, and that she would have a conversation with him today, but that he is his own POA, so if he said he wanted to cancel it, that was his choice. - I informed RAVEN of all that I had learned, and that the RN at the facility would start the pt on Bactrim today. I asked ZH to have a conversation re: the surgery with pt, and to instruct me/surgical scheduling if his surgery is to be canceled. RAVEN acknowledged and agreed to plan, stating that he would call pt later today.   - Encounter complete

## 2023-03-21 ENCOUNTER — TELEPHONE (OUTPATIENT)
Dept: FAMILY MEDICINE CLINIC | Facility: CLINIC | Age: 78
End: 2023-03-21

## 2023-03-21 NOTE — TELEPHONE ENCOUNTER
Medical Clearance was faxed over to Dr. Landry Garner at [phone]. Fax confirmation was received.

## 2023-03-21 NOTE — TELEPHONE ENCOUNTER
Call from [place] (ex wife, on TIMBO) verified patient's name/. Asking about test results showing UTI (E coli and MRSA). She was aware that  [facility] ordered nitrofurantoin but Dr Claudette Clines ordered Bactrim for infection. Not sure if patient started Bactrim. Has surgery scheduled for 3/24/2023. She states that Dr Claudette Clines talked with patient yesterday and the plan was to go forward. This RN called Ora Mayes [phone], asked to speak to patient's nurse, was trasnfered and spoke with a male staff member who would not identify himself by name. Asked me for our number and he would have their nurse call us back. He stated he knew patient got 1 dose of macrobid yesterday and this morning. Did not know about Bactrim. [facility] RN, please determine if patient ever started Bactrim. Will need to update PCP and Urology.

## 2023-03-21 NOTE — TELEPHONE ENCOUNTER
RN - Please follow up tomorrow morning. Need to clarify if patient actually stopped Macrobid and started Bactrim - or if he is taking both? See previous RN note below. Call Attempt:   RN called Lee Fine [phone] [facility] desk transferred RN to nurse , but it bounced to DON voicemail   Left voicemail to call our office.

## 2023-03-22 ENCOUNTER — TELEPHONE (OUTPATIENT)
Dept: SURGERY | Facility: CLINIC | Age: 78
End: 2023-03-22

## 2023-03-22 ENCOUNTER — TELEPHONE (OUTPATIENT)
Dept: FAMILY MEDICINE CLINIC | Facility: CLINIC | Age: 78
End: 2023-03-22

## 2023-03-22 LAB
M TB IFN-G CD4+ T-CELLS BLD-ACNC: 0.05 IU/ML
M TB TUBERC IFN-G BLD QL: NEGATIVE
M TB TUBERC IGNF/MITOGEN IGNF CONTROL: >10 IU/ML
QFT TB1 AG MINUS NIL: 0 IU/ML
QFT TB2 AG MINUS NIL: 0 IU/ML

## 2023-03-22 NOTE — TELEPHONE ENCOUNTER
LVM, for pt to call back. [facility] from is [facility] for . Form will be placed up front. From was faxed over to [facility] at [phone].

## 2023-03-22 NOTE — TELEPHONE ENCOUNTER
Caryn Xie contacted again. Unable to speak with staff, left message to call back to clarify. Will retry.

## 2023-03-22 NOTE — TELEPHONE ENCOUNTER
Called Brittny Roland with [address] who states the facility phones are not working properly today , they have maintenance looking into the issue and [address]  suggested to call back later

## 2023-03-22 NOTE — TELEPHONE ENCOUNTER
[address] ,spoke with Milton Loo, call transferred to Axel Mayo RN  states the patient had an order for Bactrim  DS that was discontinued on 3/20, patient never took any Bactrim  Patient has been taking Macrobid 100mg po BID , began  taking this medication on 3/21        Please advise and thank you.

## 2023-03-23 NOTE — TELEPHONE ENCOUNTER
-s/w Jeanette BRISENO at [facility], as AMINAH Vaca was not working at this time. Jeanette BRISENO stated that she was one of pt's RN's taking care of him. - I informed Jeanette BRISENO that per Dr. Jenae Lawrence orders, pt to be started immediately on Bactrim -160mg, BID, for 7 days. - Jeanette BRISENO read order back to me and acknowledged that she would enter order and start pt on it asap.     - Encounter complete

## 2023-03-23 NOTE — PAT NURSING NOTE
S/w Gabrielle nurse from [facility]  re: to fax Covid test results to PAT. Per nurse it was done on 3/21. She will f/u.

## 2023-03-23 NOTE — PAT NURSING NOTE
LM to the  to the  nurse  At [facility] to return PAT a call and f/u Covid test results to fax to PAT.

## 2023-03-24 NOTE — PAT NURSING NOTE
S/W Gordon Carreon, nurse of pt  from [facility] with nurse line  re: to Covid test needs to be done 3 days before surgery on 3/28/23. Nurse aware and will do Covid test tomorrow, 3/25 and to fax to PAT when results are in. Also verified with nurse if antibiotic Bactrim was started and she said not started yet but she will start the Bactrim today.

## 2023-03-24 NOTE — TELEPHONE ENCOUNTER
S/w Devi BRISENO at [facility] who confimed that pt had begun taking the Bactrim DS this am, and is due for his 2nd dose at 5pm today. Their pharmacy was out of Bactrim yesterday, so was unable to start him yesterday as I had requested.

## 2023-03-24 NOTE — TELEPHONE ENCOUNTER
- attempted to s/w RN Jeanette or Rachel Route this morning x 2 (9:15am and 10:30am) in order to confirm that pt had begun the Bactrim yesterday as ordered, but RN's were unavailable, and had been asked to call me back, but still awaiting call. Will try again in afternoon.

## 2023-03-28 ENCOUNTER — ANESTHESIA (OUTPATIENT)
Dept: SURGERY | Facility: HOSPITAL | Age: 78
End: 2023-03-28
Payer: MEDICARE

## 2023-03-28 ENCOUNTER — HOSPITAL ENCOUNTER (OUTPATIENT)
Facility: HOSPITAL | Age: 78
Setting detail: HOSPITAL OUTPATIENT SURGERY
Discharge: HOME | End: 2023-03-28
Attending: UROLOGY | Admitting: UROLOGY
Payer: MEDICARE

## 2023-03-28 ENCOUNTER — TELEPHONE (OUTPATIENT)
Dept: SURGERY | Facility: CLINIC | Age: 78
End: 2023-03-28

## 2023-03-28 ENCOUNTER — ANESTHESIA EVENT (OUTPATIENT)
Dept: SURGERY | Facility: HOSPITAL | Age: 78
End: 2023-03-28
Payer: MEDICARE

## 2023-03-28 VITALS
TEMPERATURE: 98 F | HEART RATE: 81 BPM | WEIGHT: 145 LBS | RESPIRATION RATE: 16 BRPM | HEIGHT: 70 IN | BODY MASS INDEX: 20.76 KG/M2 | SYSTOLIC BLOOD PRESSURE: 129 MMHG | DIASTOLIC BLOOD PRESSURE: 63 MMHG | OXYGEN SATURATION: 95 %

## 2023-03-28 DIAGNOSIS — Z01.818 PREOP TESTING: Primary | ICD-10-CM

## 2023-03-28 PROBLEM — R33.9 CHRONIC RETENTION OF URINE: Status: ACTIVE | Noted: 2019-05-06

## 2023-03-28 PROCEDURE — 0T7D8DZ DILATION OF URETHRA WITH INTRALUMINAL DEVICE, VIA NATURAL OR ARTIFICIAL OPENING ENDOSCOPIC: ICD-10-PCS | Performed by: UROLOGY

## 2023-03-28 PROCEDURE — 51102 DRAIN BL W/CATH INSERTION: CPT | Performed by: UROLOGY

## 2023-03-28 RX ORDER — BUPIVACAINE HYDROCHLORIDE 5 MG/ML
INJECTION, SOLUTION EPIDURAL; INTRACAUDAL AS NEEDED
Status: DISCONTINUED | OUTPATIENT
Start: 2023-03-28 | End: 2023-03-28 | Stop reason: HOSPADM

## 2023-03-28 RX ORDER — DEXTROSE MONOHYDRATE 25 G/50ML
50 INJECTION, SOLUTION INTRAVENOUS
Status: DISCONTINUED | OUTPATIENT
Start: 2023-03-28 | End: 2023-03-28 | Stop reason: HOSPADM

## 2023-03-28 RX ORDER — DEXTROSE MONOHYDRATE 25 G/50ML
50 INJECTION, SOLUTION INTRAVENOUS
Status: DISCONTINUED | OUTPATIENT
Start: 2023-03-28 | End: 2023-03-28

## 2023-03-28 RX ORDER — MORPHINE SULFATE 4 MG/ML
4 INJECTION, SOLUTION INTRAMUSCULAR; INTRAVENOUS EVERY 10 MIN PRN
Status: DISCONTINUED | OUTPATIENT
Start: 2023-03-28 | End: 2023-03-28

## 2023-03-28 RX ORDER — METOCLOPRAMIDE HYDROCHLORIDE 5 MG/ML
10 INJECTION INTRAMUSCULAR; INTRAVENOUS EVERY 8 HOURS PRN
Status: DISCONTINUED | OUTPATIENT
Start: 2023-03-28 | End: 2023-03-28

## 2023-03-28 RX ORDER — HYDROMORPHONE HYDROCHLORIDE 1 MG/ML
0.2 INJECTION, SOLUTION INTRAMUSCULAR; INTRAVENOUS; SUBCUTANEOUS EVERY 5 MIN PRN
Status: DISCONTINUED | OUTPATIENT
Start: 2023-03-28 | End: 2023-03-28

## 2023-03-28 RX ORDER — NICOTINE POLACRILEX 4 MG
30 LOZENGE BUCCAL
Status: DISCONTINUED | OUTPATIENT
Start: 2023-03-28 | End: 2023-03-28

## 2023-03-28 RX ORDER — ACETAMINOPHEN 500 MG
1000 TABLET ORAL ONCE
Status: COMPLETED | OUTPATIENT
Start: 2023-03-28 | End: 2023-03-28

## 2023-03-28 RX ORDER — CEFTRIAXONE 1 G/1
INJECTION, POWDER, FOR SOLUTION INTRAMUSCULAR; INTRAVENOUS AS NEEDED
Status: DISCONTINUED | OUTPATIENT
Start: 2023-03-28 | End: 2023-03-28 | Stop reason: SURG

## 2023-03-28 RX ORDER — MORPHINE SULFATE 4 MG/ML
2 INJECTION, SOLUTION INTRAMUSCULAR; INTRAVENOUS EVERY 10 MIN PRN
Status: DISCONTINUED | OUTPATIENT
Start: 2023-03-28 | End: 2023-03-28

## 2023-03-28 RX ORDER — TRAMADOL HYDROCHLORIDE 50 MG/1
50 TABLET ORAL EVERY 6 HOURS PRN
Qty: 15 TABLET | Refills: 0 | Status: SHIPPED | OUTPATIENT
Start: 2023-03-28

## 2023-03-28 RX ORDER — NICOTINE POLACRILEX 4 MG
15 LOZENGE BUCCAL
Status: DISCONTINUED | OUTPATIENT
Start: 2023-03-28 | End: 2023-03-28 | Stop reason: HOSPADM

## 2023-03-28 RX ORDER — NALOXONE HYDROCHLORIDE 0.4 MG/ML
80 INJECTION, SOLUTION INTRAMUSCULAR; INTRAVENOUS; SUBCUTANEOUS AS NEEDED
Status: DISCONTINUED | OUTPATIENT
Start: 2023-03-28 | End: 2023-03-28

## 2023-03-28 RX ORDER — NICOTINE POLACRILEX 4 MG
30 LOZENGE BUCCAL
Status: DISCONTINUED | OUTPATIENT
Start: 2023-03-28 | End: 2023-03-28 | Stop reason: HOSPADM

## 2023-03-28 RX ORDER — ONDANSETRON 2 MG/ML
4 INJECTION INTRAMUSCULAR; INTRAVENOUS EVERY 6 HOURS PRN
Status: DISCONTINUED | OUTPATIENT
Start: 2023-03-28 | End: 2023-03-28

## 2023-03-28 RX ORDER — SODIUM CHLORIDE, SODIUM LACTATE, POTASSIUM CHLORIDE, CALCIUM CHLORIDE 600; 310; 30; 20 MG/100ML; MG/100ML; MG/100ML; MG/100ML
INJECTION, SOLUTION INTRAVENOUS CONTINUOUS
Status: DISCONTINUED | OUTPATIENT
Start: 2023-03-28 | End: 2023-03-28

## 2023-03-28 RX ORDER — HYDROMORPHONE HYDROCHLORIDE 1 MG/ML
0.4 INJECTION, SOLUTION INTRAMUSCULAR; INTRAVENOUS; SUBCUTANEOUS EVERY 5 MIN PRN
Status: DISCONTINUED | OUTPATIENT
Start: 2023-03-28 | End: 2023-03-28

## 2023-03-28 RX ORDER — HYDROMORPHONE HYDROCHLORIDE 1 MG/ML
0.6 INJECTION, SOLUTION INTRAMUSCULAR; INTRAVENOUS; SUBCUTANEOUS EVERY 5 MIN PRN
Status: DISCONTINUED | OUTPATIENT
Start: 2023-03-28 | End: 2023-03-28

## 2023-03-28 RX ORDER — LIDOCAINE HYDROCHLORIDE 10 MG/ML
INJECTION, SOLUTION EPIDURAL; INFILTRATION; INTRACAUDAL; PERINEURAL AS NEEDED
Status: DISCONTINUED | OUTPATIENT
Start: 2023-03-28 | End: 2023-03-28 | Stop reason: SURG

## 2023-03-28 RX ORDER — ONDANSETRON 2 MG/ML
INJECTION INTRAMUSCULAR; INTRAVENOUS AS NEEDED
Status: DISCONTINUED | OUTPATIENT
Start: 2023-03-28 | End: 2023-03-28 | Stop reason: SURG

## 2023-03-28 RX ORDER — NICOTINE POLACRILEX 4 MG
15 LOZENGE BUCCAL
Status: DISCONTINUED | OUTPATIENT
Start: 2023-03-28 | End: 2023-03-28

## 2023-03-28 RX ORDER — SENNA AND DOCUSATE SODIUM 50; 8.6 MG/1; MG/1
2 TABLET, FILM COATED ORAL NIGHTLY PRN
Qty: 14 TABLET | Refills: 0 | Status: SHIPPED | OUTPATIENT
Start: 2023-03-28

## 2023-03-28 RX ORDER — SODIUM CHLORIDE 9 MG/ML
INJECTION, SOLUTION INTRAVENOUS CONTINUOUS
Status: DISCONTINUED | OUTPATIENT
Start: 2023-03-28 | End: 2023-03-28

## 2023-03-28 RX ORDER — MORPHINE SULFATE 10 MG/ML
6 INJECTION, SOLUTION INTRAMUSCULAR; INTRAVENOUS EVERY 10 MIN PRN
Status: DISCONTINUED | OUTPATIENT
Start: 2023-03-28 | End: 2023-03-28

## 2023-03-28 RX ORDER — DEXAMETHASONE SODIUM PHOSPHATE 4 MG/ML
VIAL (ML) INJECTION AS NEEDED
Status: DISCONTINUED | OUTPATIENT
Start: 2023-03-28 | End: 2023-03-28 | Stop reason: SURG

## 2023-03-28 RX ORDER — EPHEDRINE SULFATE 50 MG/ML
INJECTION INTRAVENOUS AS NEEDED
Status: DISCONTINUED | OUTPATIENT
Start: 2023-03-28 | End: 2023-03-28 | Stop reason: SURG

## 2023-03-28 RX ADMIN — LIDOCAINE HYDROCHLORIDE 25 MG: 10 INJECTION, SOLUTION EPIDURAL; INFILTRATION; INTRACAUDAL; PERINEURAL at 12:43:00

## 2023-03-28 RX ADMIN — EPHEDRINE SULFATE 5 MG: 50 INJECTION INTRAVENOUS at 13:02:00

## 2023-03-28 RX ADMIN — SODIUM CHLORIDE: 9 INJECTION, SOLUTION INTRAVENOUS at 12:35:00

## 2023-03-28 RX ADMIN — DEXAMETHASONE SODIUM PHOSPHATE 4 MG: 4 MG/ML VIAL (ML) INJECTION at 12:49:00

## 2023-03-28 RX ADMIN — SODIUM CHLORIDE: 9 INJECTION, SOLUTION INTRAVENOUS at 13:18:00

## 2023-03-28 RX ADMIN — EPHEDRINE SULFATE 5 MG: 50 INJECTION INTRAVENOUS at 12:53:00

## 2023-03-28 RX ADMIN — ONDANSETRON 4 MG: 2 INJECTION INTRAMUSCULAR; INTRAVENOUS at 12:49:00

## 2023-03-28 RX ADMIN — CEFTRIAXONE 1 G: 1 INJECTION, POWDER, FOR SOLUTION INTRAMUSCULAR; INTRAVENOUS at 12:38:00

## 2023-03-28 NOTE — INTERVAL H&P NOTE
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 His urine culture was positive given indwelling Chery catheter. He has been on sensitive oral antibiotics for the past 5 days. I discussed with the patient and/or legal representative the potential benefits, risks and side effects of this procedure; the likelihood of the patient achieving goals; and potential problems that might occur during recuperation. I discussed reasonable alternatives to the procedure, including risks, benefits and side effects related to the alternatives and risks related to not receiving this procedure. We will proceed with procedure as planned.

## 2023-03-28 NOTE — ANESTHESIA POSTPROCEDURE EVALUATION
Patient: Raysa Jones    Procedure Summary     Date: 03/28/23 Room / Location: [address] [place] [address] MAIN OR 14 / [address] [place] [address] MAIN OR    Anesthesia Start: 2958 Anesthesia Stop:     Procedures:       Cystoscopy, suprapubic catheter insertion      Catheter insertion supra pubic Diagnosis:       Urethral false passage      Atonic urinary bladder      (Urethral false passage [O59. 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     [address] [place] [address] AN Post Evaluation:   Patient Evaluated in PACU  Patient Participation: complete - patient participated  Level of Consciousness: awake  Pain Score: 7  Pain Management: adequate  Airway Patency:patent  Dental exam unchanged from preop  Yes    Cardiovascular Status: acceptable  Respiratory Status: acceptable  Postoperative Hydration acceptable  Comments: Pain at baseline      EMMETT YING MD  3/28/2023 1:30 PM

## 2023-03-28 NOTE — TELEPHONE ENCOUNTER
I sent [facility] a secure chat but it was on do not disturb so I also sent him a webex and did not get a response. I decided to call pt to see how much pain he was having and I s/w his EX who is on the TIMBO and she informed that she took him home from [facility] at 4:45 pm and she stated he had no pain at that time. She gave me pt's cell # to call him at [phone] as he is in assisted Living at [facility], in [place]. I called pt and determined that he is feeling just fine and states it;s the best he has felt in weeks. I suggested he try 2 Tylenol if he does have some pain and when I hear back from [facility] I'll see if he wants to prescribe an alternative.

## 2023-03-28 NOTE — ANESTHESIA PROCEDURE NOTES
Airway  Date/Time: 3/28/2023 12:45 PM  Urgency: Elective      General Information and Staff    Patient location during procedure: OR  Anesthesiologist: Nkechi Moncada MD  Performed: anesthesiologist   Performed by: Nkechi Moncada MD  Authorized by: Nkechi Moncada MD      Indications and Patient Condition  Indications for airway management: anesthesia  Sedation level: deep  Preoxygenated: yes  Patient position: sniffing  Mask difficulty assessment: 2 - vent by mask + OA or adjuvant +/- NMBA    Final Airway Details  Final airway type: supraglottic airway      Successful airway: classic  Size 4       Number of attempts at approach: 1  Number of other approaches attempted: 0

## 2023-03-28 NOTE — TELEPHONE ENCOUNTER
Per Sue tramadol is on back order for [place] and asking if it will be sent to a different pharmacy or is there a substitute he wants.  Please advise

## 2023-04-03 ENCOUNTER — TELEPHONE (OUTPATIENT)
Dept: SURGERY | Facility: CLINIC | Age: 78
End: 2023-04-03

## 2023-04-03 ENCOUNTER — LAB REQUISITION (OUTPATIENT)
Dept: LAB | Age: 78
End: 2023-04-03
Payer: MEDICARE

## 2023-04-03 DIAGNOSIS — Z87.440 PERSONAL HISTORY OF URINARY (TRACT) INFECTIONS: ICD-10-CM

## 2023-04-03 LAB
BILIRUB UR QL STRIP.AUTO: NEGATIVE
COLOR UR AUTO: YELLOW
GLUCOSE UR STRIP.AUTO-MCNC: NEGATIVE MG/DL
HYALINE CASTS #/AREA URNS AUTO: PRESENT /LPF
KETONES UR STRIP.AUTO-MCNC: NEGATIVE MG/DL
NITRITE UR QL STRIP.AUTO: NEGATIVE
PH UR STRIP.AUTO: 5 [PH] (ref 5–8)
PROT UR STRIP.AUTO-MCNC: 100 MG/DL
RBC #/AREA URNS AUTO: >10 /HPF
SP GR UR STRIP.AUTO: 1.01 (ref 1–1.03)
UROBILINOGEN UR STRIP.AUTO-MCNC: <2 MG/DL

## 2023-04-03 PROCEDURE — 87086 URINE CULTURE/COLONY COUNT: CPT | Performed by: UROLOGY

## 2023-04-03 PROCEDURE — 81001 URINALYSIS AUTO W/SCOPE: CPT | Performed by: UROLOGY

## 2023-04-03 NOTE — TELEPHONE ENCOUNTER
Philipp Peña from Nursing facility calling to get medication refilled and sent to pharmacy below.  Please advise     traMADol 50 MG Oral Tab      [facility] [place] - [address] - [address] [phone], [phone]

## 2023-04-04 ENCOUNTER — TELEPHONE (OUTPATIENT)
Dept: SURGERY | Facility: CLINIC | Age: 78
End: 2023-04-04

## 2023-04-04 NOTE — TELEPHONE ENCOUNTER
Patients ex-wife calling to find out if urine results and MERSA results are available, and she also wants to make sure that the the results sent to [facility].

## 2023-04-05 ENCOUNTER — TELEPHONE (OUTPATIENT)
Dept: SURGERY | Facility: CLINIC | Age: 78
End: 2023-04-05

## 2023-04-05 NOTE — TELEPHONE ENCOUNTER
Received fax from [facility]. For Order [id number]  Order Description: Received order directly from Hayden Pompa, on call for . RN to obtain urine sample for analysis and culture reflex week of 4/3/23 in order to clear a MRSA infection. Will place order on MD's desk.

## 2023-04-05 NOTE — TELEPHONE ENCOUNTER
Order signed by Maribel Celis have fax form to number provided [phone] and received confirmation.

## 2023-04-07 NOTE — TELEPHONE ENCOUNTER
- s/w ex-wife venessa; pt identity verified with name and   - Lizy Finn states that she no longer needs the results, as she had gotten them and shared them with Carmine Acevedo  - Jacy chapin

## 2023-04-19 ENCOUNTER — TELEPHONE (OUTPATIENT)
Dept: FAMILY MEDICINE CLINIC | Facility: CLINIC | Age: 78
End: 2023-04-19

## 2023-04-19 NOTE — TELEPHONE ENCOUNTER
Nurse Dileep Rush call to make Dr aware, Pt scheduled nurse visit was canceled per pt will resume next week   FYI

## 2023-05-04 NOTE — TELEPHONE ENCOUNTER
I called the [place] [facility] and asked to s/w the nurse Christophe Begun there and she was gone for the day and I was put into the DON's VM, Pastor Brock and I LM for her that we do not send meds out of state pharmacies. Also [facility] only prescribed 15 tabs for pt after a procedure pt had in March and if pt is having chronic pain this should be addressed by the staff physician at the home.

## 2023-05-08 ENCOUNTER — TELEPHONE (OUTPATIENT)
Dept: FAMILY MEDICINE CLINIC | Facility: CLINIC | Age: 78
End: 2023-05-08

## 2023-05-08 DIAGNOSIS — E78.2 MIXED HYPERLIPIDEMIA: ICD-10-CM

## 2023-05-08 NOTE — TELEPHONE ENCOUNTER
Lakisha calling on behalf of patient to state a list of medication needs to be sent to [facility], as patient is now living at [facility] and they will be providing medications. Double Island Pedicle Flap Text: The defect edges were debeveled with a #15 scalpel blade.  Given the location of the defect, shape of the defect and the proximity to free margins a double island pedicle advancement flap was deemed most appropriate.  Using a sterile surgical marker, an appropriate advancement flap was drawn incorporating the defect, outlining the appropriate donor tissue and placing the expected incisions within the relaxed skin tension lines where possible.    The area thus outlined was incised deep to adipose tissue with a #15 scalpel blade.  The skin margins were undermined to an appropriate distance in all directions around the primary defect and laterally outward around the island pedicle utilizing iris scissors.  There was minimal undermining beneath the pedicle flap.

## 2023-05-08 NOTE — TELEPHONE ENCOUNTER
I called Linda Love [facility] Verbal Release verified), she wanted to make us aware that patient is now residing at [facility]. She was made aware patient has multiple providers/specialists that also prescribe for patient. She agreed to update preferred pharmacy as just [facility], since that is where [facility] obtains their medications. She will call [facility] to have them transfer Rx's. She also scheduled medication follow-up visit with Dr. Haydee Stark for patient to review medications and assess which medications Dr. Haydee Stark will prescribe accordingly. She will arrange transport and make sure patient has an updated medication list. Patient verbalized understanding. No further questions or concerns at this time.     Future Appointments   Date Time Provider Jenna Conway   5/11/2023  4:30 PM [facility]   7/26/2023 10:45 AM Erick Oliveros MD [facility]

## 2023-05-10 ENCOUNTER — TELEPHONE (OUTPATIENT)
Dept: SURGERY | Facility: CLINIC | Age: 78
End: 2023-05-10

## 2023-05-10 NOTE — TELEPHONE ENCOUNTER
Per wife pt is due for a catheter changed the end of this week and is requesting an appointment.  Please advise

## 2023-05-10 NOTE — TELEPHONE ENCOUNTER
Prasanna Benitez from [facility] calling to fl/u on message below. Prasanna Benitez is also willing to do the catheter change but waiting to speak to a nurse to decide. . Please advise

## 2023-05-11 ENCOUNTER — OFFICE VISIT (OUTPATIENT)
Dept: FAMILY MEDICINE CLINIC | Facility: CLINIC | Age: 78
End: 2023-05-11

## 2023-05-11 VITALS
DIASTOLIC BLOOD PRESSURE: 73 MMHG | SYSTOLIC BLOOD PRESSURE: 115 MMHG | HEART RATE: 83 BPM | HEIGHT: 70 IN | TEMPERATURE: 98 F | BODY MASS INDEX: 20.7 KG/M2 | WEIGHT: 144.63 LBS

## 2023-05-11 DIAGNOSIS — S32.010A COMPRESSION FRACTURE OF L1 VERTEBRA, INITIAL ENCOUNTER (HCC): ICD-10-CM

## 2023-05-11 DIAGNOSIS — R73.9 HYPERGLYCEMIA: ICD-10-CM

## 2023-05-11 DIAGNOSIS — N31.2 ATONIC URINARY BLADDER: Primary | ICD-10-CM

## 2023-05-11 DIAGNOSIS — M54.50 LUMBAR PAIN: ICD-10-CM

## 2023-05-11 DIAGNOSIS — Z93.59 SUPRAPUBIC CATHETER (HCC): ICD-10-CM

## 2023-05-11 DIAGNOSIS — F10.11 HISTORY OF ALCOHOL ABUSE: ICD-10-CM

## 2023-05-11 PROCEDURE — 99214 OFFICE O/P EST MOD 30 MIN: CPT | Performed by: FAMILY MEDICINE

## 2023-05-11 PROCEDURE — 1126F AMNT PAIN NOTED NONE PRSNT: CPT | Performed by: FAMILY MEDICINE

## 2023-05-11 RX ORDER — NITROFURANTOIN 25; 75 MG/1; MG/1
CAPSULE ORAL
COMMUNITY
Start: 2023-03-20 | End: 2023-05-11 | Stop reason: ALTCHOICE

## 2023-05-12 NOTE — TELEPHONE ENCOUNTER
MD Ailyn Palm  Urology Clinical Staff  Caller: Unspecified (2 days ago, 12:36 PM)  First suprapubic catheter change should take place with us in the office. Theres a stitch that needs to be cut. Can see me or Shayna Sarah. I called pt ex wife, Flavia(TIM[employer]) LM that the 1st suprapubic cath change needs to be done here in the office with Anh MEZA or Dr. Hallie Benton. Dr. ROSEMARY MALDONADO [facility] has a 15 minute exam spot on 5/17/23 at 2:00pm so I did place pt on the schedule when pt calls back please confirm if this works for pt.

## 2023-05-12 NOTE — TELEPHONE ENCOUNTER
Dr. Ashley Patino,   3/28/23 Procedure(s): Cystoscopy, suprapubic catheter insertion. Catheter has NOT been changed since procedure. Carley Walsh, from Home health asking for orders if they can start changing finley? Please advise.

## 2023-05-18 NOTE — TELEPHONE ENCOUNTER
Noted.     Future Appointments   Date Time Provider Jenna Conway   5/24/2023  2:45 PM [facility]   7/26/2023 10:45 AM [facility]

## 2023-05-24 ENCOUNTER — OFFICE VISIT (OUTPATIENT)
Dept: SURGERY | Facility: CLINIC | Age: 78
End: 2023-05-24

## 2023-05-24 DIAGNOSIS — R33.9 URINARY RETENTION: ICD-10-CM

## 2023-05-24 DIAGNOSIS — N36.5 URETHRAL FALSE PASSAGE: ICD-10-CM

## 2023-05-24 DIAGNOSIS — N31.2 ATONIC BLADDER: Primary | ICD-10-CM

## 2023-05-24 PROCEDURE — 51705 CHANGE OF BLADDER TUBE: CPT | Performed by: NURSE PRACTITIONER

## 2023-05-25 ENCOUNTER — TELEPHONE (OUTPATIENT)
Dept: SURGERY | Facility: CLINIC | Age: 78
End: 2023-05-25

## 2023-05-25 NOTE — TELEPHONE ENCOUNTER
Elijah Cain from [facility] states patient is still having pain from catheter inserted yesterday. States she will be seeing patient again next week to follow up with him. Just an FYI.

## 2023-05-31 ENCOUNTER — TELEPHONE (OUTPATIENT)
Dept: SURGERY | Facility: CLINIC | Age: 78
End: 2023-05-31

## 2023-05-31 NOTE — TELEPHONE ENCOUNTER
-Call-Center transferred pt' ex-wife Ezra Begum) to Urology; pt identity verified.   -She reports pt having pain @ s/p catheter insertion sight.  -Upon investigation, she acknowledges pt removed stat-lock which accounts for catheter tugging.  -The pt has an extra stat-lock & she will call his [employer] for placement.  -Pt has an apartment @ [employer].  -Encounter complete.

## 2023-05-31 NOTE — TELEPHONE ENCOUNTER
Pt calling states he is just returning text from [facility] states reporting pain level states is the same can function not getting any better after SX  please advise

## 2023-06-05 ENCOUNTER — LAB REQUISITION (OUTPATIENT)
Dept: LAB | Facility: HOSPITAL | Age: 78
End: 2023-06-05
Payer: MEDICARE

## 2023-06-05 DIAGNOSIS — N39.0 URINARY TRACT INFECTION, SITE NOT SPECIFIED: ICD-10-CM

## 2023-06-05 LAB
BILIRUB UR QL: NEGATIVE
GLUCOSE UR-MCNC: NORMAL MG/DL
KETONES UR-MCNC: NEGATIVE MG/DL
LEUKOCYTE ESTERASE UR QL STRIP.AUTO: 500
PH UR: 5.5 [PH] (ref 5–8)
PROT UR-MCNC: NEGATIVE MG/DL
SP GR UR STRIP: 1.01 (ref 1–1.03)
UROBILINOGEN UR STRIP-ACNC: NORMAL
WBC CLUMPS UR QL AUTO: PRESENT /HPF

## 2023-06-05 PROCEDURE — 81001 URINALYSIS AUTO W/SCOPE: CPT | Performed by: FAMILY MEDICINE

## 2023-06-05 PROCEDURE — 87086 URINE CULTURE/COLONY COUNT: CPT | Performed by: FAMILY MEDICINE

## 2023-06-05 PROCEDURE — 87186 SC STD MICRODIL/AGAR DIL: CPT | Performed by: FAMILY MEDICINE

## 2023-06-05 PROCEDURE — 87077 CULTURE AEROBIC IDENTIFY: CPT | Performed by: FAMILY MEDICINE

## 2023-06-06 ENCOUNTER — TELEPHONE (OUTPATIENT)
Dept: SURGERY | Facility: CLINIC | Age: 78
End: 2023-06-06

## 2023-06-06 NOTE — TELEPHONE ENCOUNTER
Received orders from [facility] for order number [id number] faxed and received fax confirmation sent to scanning

## 2023-06-07 RX ORDER — SULFAMETHOXAZOLE AND TRIMETHOPRIM 800; 160 MG/1; MG/1
1 TABLET ORAL 2 TIMES DAILY
Qty: 14 TABLET | Refills: 0 | Status: SHIPPED | OUTPATIENT
Start: 2023-06-07 | End: 2023-06-14

## 2023-06-08 ENCOUNTER — TELEPHONE (OUTPATIENT)
Dept: SURGERY | Facility: CLINIC | Age: 78
End: 2023-06-08

## 2023-06-08 NOTE — TELEPHONE ENCOUNTER
Called the patient's phone number and left a message to call back. Then called the emergency contact, Natty Rodriguez, and relayed Marion's message to her. Natty Rodriguez stated that she would make sure that Sammy Prabhjot will take the antibiotics. Encounter closed.

## 2023-06-08 NOTE — TELEPHONE ENCOUNTER
----- Message from ANGELI Renee sent at 6/7/2023  5:23 PM CDT -----  Please let patient know his urine culture is positive. I am going to send Bactrim DS 1 tab PO BID x 7 days to his pharmacy. I hope he is feeling better soon.

## 2023-06-08 NOTE — TELEPHONE ENCOUNTER
Received a phone call from [facility] that they had not received the prescription. I gave a verbal order per protocol to fill the prescription. Epic issues yesterday which may have caused the delay. Encounter closed.

## 2023-06-17 NOTE — TELEPHONE ENCOUNTER
See below, as well as \"my chart messages. \" I phoned [employer] in attempt to fined out why pt has not received his supplies. (see nurse Sanjuana's chris 7/03/19).  She faxed the order to [facility] [employer] for cic supplies and received confirmation of transmission th No

## 2023-06-19 ENCOUNTER — TELEPHONE (OUTPATIENT)
Dept: SURGERY | Facility: CLINIC | Age: 78
End: 2023-06-19

## 2023-06-19 NOTE — TELEPHONE ENCOUNTER
Received fax from [facility] for Dr. Caryn Sosa and date for skilled nursing and suprapubic care. [facility] signed it and dated it and I faxed it back to [facility] and received a fax confirmation. Sent the pages to scanning.

## 2023-07-14 ENCOUNTER — TELEPHONE (OUTPATIENT)
Dept: SURGERY | Facility: CLINIC | Age: 78
End: 2023-07-14

## 2023-07-14 NOTE — TELEPHONE ENCOUNTER
Henrry Ivey calling from [facility] stating cath was changed 5 days sooner pt catch on clothing and tugged on it states looks nasty states she just wanted to give a heads up please advise

## 2023-07-14 NOTE — TELEPHONE ENCOUNTER
I s/w Keya Stain the pt's [facility] nurse and determined that she happened to run into pt in the hallway at the facility he lives when she was seeing another pt. Pt told her that something was wrong with his SP cath and she made a visit and examined his SP site and she stated that it was crusty with blood and sere-sanguinous drainage. She stated that it looked like he may have accidentally pulled on it and caused some trauma but that after she cleaned it up it looked fine and the urine color was normal and there was no c/o pain or burning, or fever chills from the pt. I thanked her for the info.

## 2023-07-18 ENCOUNTER — TELEPHONE (OUTPATIENT)
Dept: SURGERY | Facility: CLINIC | Age: 78
End: 2023-07-18

## 2023-07-18 NOTE — TELEPHONE ENCOUNTER
Received order form from [address] [place] [address] requesting 's signature for suprapubic catheter change. I will place form on 's desk to review and sign.

## 2023-07-26 ENCOUNTER — PATIENT MESSAGE (OUTPATIENT)
Dept: ADMINISTRATIVE | Age: 78
End: 2023-07-26

## 2023-07-31 ENCOUNTER — TELEPHONE (OUTPATIENT)
Dept: SURGERY | Facility: CLINIC | Age: 78
End: 2023-07-31

## 2023-07-31 NOTE — TELEPHONE ENCOUNTER
Action Requested: Summary for Provider     []  Critical Lab, Recommendations Needed  [] Need Additional Advice  []   FYI    []   Need Orders  [] Need Medications Sent to Pharmacy  []  Other     SUMMARY: Pt going to UC for swollen lips/tongue.     Per spouse From: Kade Martinez  To: Prosper Hailey Sample  Sent: 7/31/2023 7:59 AM CDT  Subject: medication    my duloxetine got mailed to my home. not sure y? ?? im in [facility] right now. My sister in law is very sick so my wife are helping her. Could you please call me in some to the [facility] [address] in [facility] [phone]. please call me ifyou have any questions [phone].  thank you

## 2023-07-31 NOTE — TELEPHONE ENCOUNTER
Received  [facility] health order # [id number] placed on Dr Mattie clay for review

## 2023-08-11 NOTE — TELEPHONE ENCOUNTER
Ivonne from [facility] calling to follow up on orders below. States they have been awaiting for doctor to sign and fax back. Please advise     FAX: [phone].  Please advise

## 2023-08-15 NOTE — TELEPHONE ENCOUNTER
I called Bhavna Swain again and told her that I found no order with this # in the fax bin or in media. She stated that she faxed it to us again and I told her that I will personally ask [facility] to sign it and will fax it back to her.

## 2023-08-15 NOTE — TELEPHONE ENCOUNTER
Tried to reach [address] the [facility] nurse at [address]. Do not find any new HH orders that taj to be signed. There is an old msg that some were received on 7/18 and we faxed them back on 7/19 and received a confirmation.

## 2023-08-22 NOTE — TELEPHONE ENCOUNTER
Please review. Protocol failed / Has no protocol.      Requested Prescriptions   Pending Prescriptions Disp Refills    B-1 100 MG Oral Tab [Pharmacy Med Name: thiamine HCl (vitamin B1) 100 mg tablet] 90 tablet 0     Sig: TAKE 1 TABLET BY MOUTH DAILY       There is no refill protocol information for this order       VITAMIN D HIGH POTENCY 25 MCG (1000 UT) Oral Cap [Pharmacy Med Name: Vitamin D3 25 mcg (1,000 unit) capsule] 90 capsule 0     Sig: TAKE 1 CAPSULE BY MOUTH DAILY       There is no refill protocol information for this order       B-12 100 MCG Oral Tab [Pharmacy Med Name: WM B-12 100MCG TABS] 90 tablet 0     Sig: TAKE 1 TABLET BY MOUTH DAILY       There is no refill protocol information for this order       MELATONIN 3 MG Oral Tab [Pharmacy Med Name: Melatonin    Tab 3mg] 90 tablet 0     Sig: TAKE 1 TABLET BY MOUTH DAILY AT BEDTIME       There is no refill protocol information for this order           Recent Outpatient Visits              3 months ago Atonic bladder    [address] [facility], [place], [address], APRN    Office Visit    3 months ago Atonic urinary bladder    [address] [address], [place],     Office Visit    5 months ago Well adult exam    [facility], [address], Gabriele Hoffman, APRN    Office Visit    7 months ago Urinary retention    Akhil Sanabria MD    Office Visit    1 year ago [address]     Telemedicine

## 2023-08-23 RX ORDER — MELATONIN
3 NIGHTLY
Qty: 90 TABLET | Refills: 0 | Status: SHIPPED | OUTPATIENT
Start: 2023-08-23 | End: 2023-08-25

## 2023-08-23 RX ORDER — PYRIDOXINE HCL (VITAMIN B6) 50 MG
1 TABLET ORAL DAILY
Qty: 90 TABLET | Refills: 0 | Status: SHIPPED | OUTPATIENT
Start: 2023-08-23 | End: 2023-08-25

## 2023-08-23 RX ORDER — METHION/INOS/CHOL BT/B COM/LIV 110MG-86MG
1 CAPSULE ORAL DAILY
Qty: 90 TABLET | Refills: 0 | Status: SHIPPED | OUTPATIENT
Start: 2023-08-23 | End: 2023-08-25

## 2023-08-24 ENCOUNTER — TELEPHONE (OUTPATIENT)
Dept: FAMILY MEDICINE CLINIC | Facility: CLINIC | Age: 78
End: 2023-08-24

## 2023-08-24 NOTE — TELEPHONE ENCOUNTER
Patient outreach to schedule a Medicare annual well visit with Dr. Rohit Jordan. Last px not on file. No answer, LVM to call back.

## 2023-08-25 RX ORDER — METHION/INOS/CHOL BT/B COM/LIV 110MG-86MG
1 CAPSULE ORAL DAILY
Qty: 90 TABLET | Refills: 0 | Status: SHIPPED | OUTPATIENT
Start: 2023-08-25

## 2023-08-25 RX ORDER — MELATONIN
3 NIGHTLY
Qty: 90 TABLET | Refills: 0 | Status: SHIPPED | OUTPATIENT
Start: 2023-08-25 | End: 2023-08-25

## 2023-08-25 RX ORDER — METHION/INOS/CHOL BT/B COM/LIV 110MG-86MG
1 CAPSULE ORAL DAILY
Qty: 90 TABLET | Refills: 0 | Status: SHIPPED | OUTPATIENT
Start: 2023-08-25 | End: 2023-08-25

## 2023-08-25 RX ORDER — CHOLECALCIFEROL (VITAMIN D3) 25 MCG
1000 TABLET ORAL DAILY
Qty: 60 TABLET | Refills: 0 | Status: SHIPPED | OUTPATIENT
Start: 2023-08-25

## 2023-08-25 RX ORDER — MELATONIN
3 NIGHTLY
Qty: 90 TABLET | Refills: 0 | Status: SHIPPED | OUTPATIENT
Start: 2023-08-25

## 2023-08-25 RX ORDER — PYRIDOXINE HCL (VITAMIN B6) 50 MG
1 TABLET ORAL DAILY
Qty: 90 TABLET | Refills: 0 | Status: SHIPPED | OUTPATIENT
Start: 2023-08-25 | End: 2023-08-25

## 2023-08-25 RX ORDER — PYRIDOXINE HCL (VITAMIN B6) 50 MG
1 TABLET ORAL DAILY
Qty: 90 TABLET | Refills: 0 | Status: SHIPPED | OUTPATIENT
Start: 2023-08-25

## 2023-09-27 ENCOUNTER — TELEPHONE (OUTPATIENT)
Dept: FAMILY MEDICINE CLINIC | Facility: CLINIC | Age: 78
End: 2023-09-27

## 2023-09-27 NOTE — TELEPHONE ENCOUNTER
Patient outreach to schedule a Medicare annual well visit with Dr. Adrián Rodriguez. Last px not on file. Pt states he will schedule appt on his own time.

## 2023-10-25 DIAGNOSIS — E78.2 MIXED HYPERLIPIDEMIA: ICD-10-CM

## 2023-10-26 RX ORDER — SERTRALINE HYDROCHLORIDE 25 MG/1
TABLET, FILM COATED ORAL
Qty: 90 TABLET | Refills: 0 | OUTPATIENT
Start: 2023-10-26

## 2023-10-26 NOTE — TELEPHONE ENCOUNTER
Please review. Protocol failed / Has no protocol. Please review pended refill request as unable to refill due to high/very high drug interaction warning copied here:    High  Drug-Drug: traMADol and sertralineSerotonergic effects may be additive when tramadol and selective serotonin reuptake inhibitors (SSRIs) are coadministered. The risk of serotonin syndrome/toxicity may be increased. High  Drug-Drug: ibuprofen and sertralineToxic effects may be increased with concurrent administration of ibuprofen and Selective Serotonin Reuptake Inhibitors. The risk of upper gastrointestinal bleeding may be increased. Patients taking both drugs concurrently should be educated about the signs and symptoms of GI bleeding. Details     Requested Prescriptions   Pending Prescriptions Disp Refills    amLODIPine 5 MG Oral Tab [Pharmacy Med Name: amlodipine 5 mg tablet] 90 tablet 0     Sig: Take 1 tablet (5 mg total) by mouth daily. Hypertensive Medications Protocol Failed - 10/26/2023  4:30 PM        Failed - CMP or BMP in past 6 months     No results found for this or any previous visit (from the past 4392 hour(s)).             Passed - In person appointment in the past 12 or next 3 months     Recent Outpatient Visits              5 months ago Atonic bladder    [address] [place] [address], [employer], [address], APRN    Office Visit    5 months ago Atonic urinary bladder    [address] [facility], [address], [place],     Office Visit    7 months ago Well adult exam    [address] [place] [address] [place] ANGELI Saenz    Office Visit    9 months ago Urinary retention    Levy Louis MD    Office Visit    1 year ago [address], [address] [facility], [place], DO    Telemedicine                      Passed - Last BP reading less than 140/90     BP Readings from Last 1 Encounters:  05/11/23 : 115/73              Passed - In person appointment or virtual visit in the past 6 months     Recent Outpatient Visits              5 months ago Atonic bladder    [address] [facility], [employer], [address], APRN    Office Visit    5 months ago Atonic urinary bladder    Amari Garza [address], [address], [place] Evy    Office Visit    7 months ago Well adult exam    [address] [facility], Gabriele Mcallister, APRN    Office Visit    9 months ago Urinary retention    Veronica Mccloud MD    Office Visit    1 year ago [address], [place] Mayo DO    Telemedicine                      Passed - EGFRCR or GFRNAA > 50     GFR Evaluation  EGFRCR: 60 , resulted on 3/14/2023            atorvastatin 80 MG Oral Tab [Pharmacy Med Name: atorvastatin 80 mg tablet] 90 tablet 0     Sig: Take 1 tablet (80 mg total) by mouth nightly.        Cholesterol Medication Protocol Failed - 10/26/2023  4:30 PM        Failed - ALT in past 12 months        Failed - Last ALT < 80     Lab Results   Component Value Date    ALT 37 10/19/2022             Passed - LDL in past 12 months        Passed - Last LDL < 130     Lab Results   Component Value Date    LDL 35 03/20/2023             Passed - In person appointment or virtual visit in the past 12 mos or appointment in next 3 mos     Recent Outpatient Visits              5 months ago Atonic bladder    [address] [facility], [employer], [address], APRN    Office Visit    5 months ago Atonic urinary bladder    [address], [address], [place],     Office Visit    7 months ago Well adult exam    [address] [facility], Gabriele Mcallister, APRN    Office Visit    9 months ago Urinary retention Saige Ross MD    Office Visit    1 year ago Cystitis    [address] [facility], [facility]    Telemedicine                        folic acid 1 MG Oral Tab [Pharmacy Med Name: folic acid 1 mg tablet] 90 tablet 0     Sig: Take 1 tablet (1 mg total) by mouth daily. There is no refill protocol information for this order       sertraline 25 MG Oral Tab 90 tablet 3     Sig: Take 3 tablets (75 mg total) by mouth daily.        Psychiatric Non-Scheduled (Anti-Anxiety) Passed - 10/26/2023  4:30 PM        Passed - In person appointment or virtual visit in the past 6 mos or appointment in next 3 mos     Recent Outpatient Visits              5 months ago Atonic bladder    [address] [facility], [employer], [address], APRN    Office Visit    5 months ago [facility] urinary bladder    [address], [facility], [address], [facility]    Office Visit    7 months ago Well adult exam    [address] [facility], [place] Deon Enriquez, APRN    Office Visit    9 months ago Urinary retention    Saige Ross MD    Office Visit    [facility], [facility], [facility],     Telemedicine                       Refused Prescriptions Disp Refills    SERTRALINE 50 MG Oral Tab [Pharmacy Med Name: sertraline 50 mg tablet] 90 tablet 0     Sig: TAKE 1 TABLET BY MOUTH DAILY w/ 25mg=75mg total dose       Psychiatric Non-Scheduled (Anti-Anxiety) Passed - 10/26/2023  4:30 PM        Passed - In person appointment or virtual visit in the past 6 mos or appointment in next 3 mos     Recent Outpatient Visits              5 months ago Atonic bladder    [address] [facility], [employer], [address], APRN    Office Visit    5 months ago [facility] urinary bladder    [facility], [address], Gabriele Schultz, DO    Office Visit    7 months ago Well adult exam    Kinga Randolph, [place] Bl[place], APRN    Office Visit    9 months ago Urinary retention    Mauricio Neumann MD    Office Visit    1 year ago [address], [place]rie[place]umen, [place], DO    Telemedicine                        SERTRALINE 25 MG Oral Tab [Pharmacy Med Name: sertraline 25 mg tablet] 90 tablet 0     Sig: TAKE 1 TABLET BY MOUTH DAILY w/ 50mg=75mg total dose       Psychiatric Non-Scheduled (Anti-Anxiety) Passed - 10/26/2023  4:30 PM        Passed - In person appointment or virtual visit in the past 6 mos or appointment in next 3 mos     Recent Outpatient Visits              5 months ago Atonic bladder    [address], [address] [facility] [address], [employer], [address], APRN    Office Visit    5 months ago Atonic urinary bladder    [address], [address], [address], [place], DO    Office Visit    7 months ago Well adult exam    Kinga Randolph, [place] Blanton, APRN    Office Visit    9 months ago Urinary retention    [address], [place] [address], Wood Fry MD    Office Visit    1 year ago [address], [address], [place], DO    Telemedicine                            Recent Outpatient Visits              5 months ago Atonic bladder    [address], [address], [employer], [address], APRN    Office Visit    5 months ago Atonic urinary bladder    [address], [address], [address], [place], DO    Office Visit    7 months ago Well adult exam    Kinga Randolph, [place] Blanton, APRN    Office Visit    9 months ago Urinary retention    Yelena Byrd MD    Office Visit    1 year ago [address], [place], [place]    Telemedicine

## 2023-10-26 NOTE — TELEPHONE ENCOUNTER
Refill passed per [employer], [facility] protocol. Requested Prescriptions   Pending Prescriptions Disp Refills    AMLODIPINE 5 MG Oral Tab [Pharmacy Med Name: amlodipine 5 mg tablet] 90 tablet 0     Sig: TAKE 1 TABLET BY MOUTH DAILY       Hypertensive Medications Protocol Failed - 10/26/2023  4:30 PM        Failed - CMP or BMP in past 6 months     No results found for this or any previous visit (from the past 4392 hour(s)).             Passed - In person appointment in the past 12 or next 3 months     Recent Outpatient Visits              5 months ago Atonic bladder    [address] [place] [employer], [employer], [address], APRN    Office Visit    5 months ago Atonic urinary bladder    [address] [facility], [address], [place], DO    Office Visit    7 months ago Well adult exam    [address] [facility], [place] Corinne Luna, APRN    Office Visit    9 months ago Urinary retention    [address], [address], Mauricio Zhou MD    Office Visit    1 year ago [address] , [place] Mayo,     Telemedicine                      Passed - Last BP reading less than 140/90     BP Readings from Last 1 Encounters:  05/11/23 : 115/73              Passed - In person appointment or virtual visit in the past 6 months     Recent Outpatient Visits              5 months ago [facility]ton[facility] bladder    [facility], [employer], [address], APRN    Office Visit    5 months ago Atonic urinary bladder    [address], [address], [address], [place], DO    Office Visit    7 months ago Well adult exam    [address] [facility], [place] Corinne Luna, APRN    Office Visit    9 months ago Urinary retention    [address] [facility], Nevaeh Romo MD    Office Visit    1 year ago Cystitis [address] [place] [address], [place] Berumen, [place] Evy    Telemedicine                      Passed - [place] or GFRNAA > 50     GFR Evaluation  EGFRCR: 60 , resulted on 3/14/2023            ATORVASTATIN 80 MG Oral Tab [Pharmacy Med Name: atorvastatin 80 mg tablet] 90 tablet 0     Sig: TAKE 1 TABLET BY MOUTH DAILY AT BEDTIME       Cholesterol Medication Protocol Failed - 10/26/2023  4:30 PM        Failed - ALT in past 12 months        Failed - Last ALT < 80     Lab Results   Component Value Date    ALT 37 10/19/2022             Passed - LDL in past 12 months        Passed - Last LDL < 130     Lab Results   Component Value Date    LDL 35 03/20/2023             Passed - In person appointment or virtual visit in the past 12 mos or appointment in next 3 mos     Recent Outpatient Visits              5 months ago Atonic bladder    [address] [place] [address], [employer], [address], APRN    Office Visit    5 months ago [facility]    [address], [address], [address], [place] Evy    Office Visit    7 months ago Well adult exam    [address] [place], [place] [address]Gab[place]le, APRN    Office Visit    9 months ago Urinary retention    [address] [place] [address], Benito Dawson MD    Office Visit    1 year ago [address], [place] Xiong DO    Telemedicine                        FOLIC ACID 1 MG Oral Tab [Pharmacy Med Name: folic acid 1 mg tablet] 90 tablet 0     Sig: TAKE 1 TABLET BY MOUTH DAILY       There is no refill protocol information for this order       sertraline 25 MG Oral Tab 90 tablet 0     Sig: Take 3 tablets (75 mg total) by mouth daily.        Psychiatric Non-Scheduled (Anti-Anxiety) Passed - 10/26/2023  4:30 PM        Passed - In person appointment or virtual visit in the past 6 mos or appointment in next 3 mos     Recent Outpatient Visits              5 months ago Atonic bladder    [address] [facility], [employer], [address], APRN    Office Visit    5 months ago Atonic urinary bladder    [address] [facility], [address], [place] Angel Roxanne,     Office Visit    7 months ago Well adult exam    [address] [facility], [place]rie[place] Sandhu Noman, APRN    Office Visit    9 months ago Urinary retention    [address] [facility], Mauricio Maloney MD    Office Visit    1 year ago [address], [place] Miguel Angelgloria Oliveira,     Telemedicine                       Refused Prescriptions Disp Refills    SERTRALINE 50 MG Oral Tab [Pharmacy Med Name: sertraline 50 mg tablet] 90 tablet 0     Sig: TAKE 1 TABLET BY MOUTH DAILY w/ 25mg=75mg total dose       Psychiatric Non-Scheduled (Anti-Anxiety) Passed - 10/26/2023  4:30 PM        Passed - In person appointment or virtual visit in the past 6 mos or appointment in next 3 mos     Recent Outpatient Visits              5 months ago Atonic bladder    [address], [address] [facility] [address], [employer], [address], APRN    Office Visit    5 months ago Atonic urinary bladder    [address], [address], [address], [place] Angel Oliveira,     Office Visit    7 months ago Well adult exam    [address] [facility], [place] Phoeberuchi Tineo, APRN    Office Visit    9 months ago Urinary retention    [address] [facility], Loni Crowley MD    Office Visit    1 year ago Jenna Esparza, [address], [place] Angeljacqueline Oliveira,     Telemedicine                        SERTRALINE 25 MG Oral Tab [Pharmacy Med Name: sertraline 25 mg tablet] 90 tablet 0     Sig: TAKE 1 TABLET BY MOUTH DAILY w/ 50mg=75mg total dose       Psychiatric Non-Scheduled (Anti-Anxiety) Passed - 10/26/2023  4:30 PM Passed - In person appointment or virtual visit in the past 6 mos or appointment in next 3 mos     Recent Outpatient Visits              5 months ago Atonic bladder    [address] [facility], [employer], [address], APRN    Office Visit    5 months ago Atonic urinary bladder    [address], [address], [address], [place], DO    Office Visit    7 months ago Well adult exam    [address] [facility], Gabriele Hoffman, APRN    Office Visit    9 months ago Urinary retention    [address], [address], Amelia Ruggiero MD    Office Visit    1 year ago Jenna Esparza, [address], [address], [place], DO    Telemedicine                           Recent Outpatient Visits              5 months ago Atonic bladder    [address], [address], [employer], [address], APRN    Office Visit    5 months ago Atonic urinary bladder    [address], [address], [address], [place] Evy    Office Visit    7 months ago Well adult exam    [address] [facility], Gabriele Hoffman, APRN    Office Visit    9 months ago Urinary retention    Akhil Sanabria MD    Office Visit    1 year ago [address], [place] Madrigal DO    Telemedicine

## 2023-10-27 RX ORDER — ATORVASTATIN CALCIUM 80 MG/1
80 TABLET, FILM COATED ORAL NIGHTLY
Qty: 90 TABLET | Refills: 0 | Status: SHIPPED | OUTPATIENT
Start: 2023-10-27

## 2023-10-27 RX ORDER — AMLODIPINE BESYLATE 5 MG/1
5 TABLET ORAL DAILY
Qty: 90 TABLET | Refills: 0 | Status: SHIPPED | OUTPATIENT
Start: 2023-10-27

## 2023-10-27 RX ORDER — FOLIC ACID 1 MG/1
1 TABLET ORAL DAILY
Qty: 90 TABLET | Refills: 0 | Status: SHIPPED | OUTPATIENT
Start: 2023-10-27

## 2023-10-27 RX ORDER — SERTRALINE HYDROCHLORIDE 25 MG/1
75 TABLET, FILM COATED ORAL DAILY
Qty: 90 TABLET | Refills: 0 | Status: SHIPPED | OUTPATIENT
Start: 2023-10-27

## 2023-10-27 NOTE — LETTER
5/2/2022              [address] [facility] [address]         To whom it may concern,    Olga Chand is currently a patient under my medical care. Ilda Estrada, his current wife revokes her status as a power of  for [facility] for his medical needs. We will no longer consider her to be the power of  for Mr. Oly Diaz. If you require additional information please do not hesitate to contact my office.         Sincerely,    Andriy Ying DO  [facility], [address], [address] [place] [address]  [phone]        Document electronically generated by:  Andriy Ying DO [employer] Biopsy: dermis

## 2023-11-30 RX ORDER — SERTRALINE HYDROCHLORIDE 25 MG/1
25 TABLET, FILM COATED ORAL DAILY
Qty: 90 TABLET | Refills: 0 | Status: SHIPPED | OUTPATIENT
Start: 2023-11-30

## 2023-12-27 ENCOUNTER — TELEPHONE (OUTPATIENT)
Dept: FAMILY MEDICINE CLINIC | Facility: CLINIC | Age: 78
End: 2023-12-27

## 2023-12-27 NOTE — TELEPHONE ENCOUNTER
Calderon from [facility] is calling and states that they would like to re-certify the patient for his catheter changes.

## 2024-01-08 ENCOUNTER — MED REC SCAN ONLY (OUTPATIENT)
Dept: FAMILY MEDICINE CLINIC | Facility: CLINIC | Age: 79
End: 2024-01-08

## 2024-01-24 DIAGNOSIS — E78.2 MIXED HYPERLIPIDEMIA: ICD-10-CM

## 2024-01-25 RX ORDER — AMLODIPINE BESYLATE 5 MG/1
5 TABLET ORAL DAILY
Qty: 90 TABLET | Refills: 0 | Status: SHIPPED | OUTPATIENT
Start: 2024-01-25

## 2024-01-25 RX ORDER — ATORVASTATIN CALCIUM 80 MG/1
80 TABLET, FILM COATED ORAL NIGHTLY
Qty: 90 TABLET | Refills: 0 | Status: SHIPPED | OUTPATIENT
Start: 2024-01-25

## 2024-01-25 RX ORDER — FOLIC ACID 1 MG/1
1 TABLET ORAL DAILY
Qty: 90 TABLET | Refills: 0 | Status: SHIPPED | OUTPATIENT
Start: 2024-01-25

## 2024-01-25 NOTE — TELEPHONE ENCOUNTER
Please review.  Protocol failed / Has no protocol.    [employer] message sent to patient to schedule an office visit with PCP.   Will also make a phone attempt.     Requested Prescriptions   Pending Prescriptions Disp Refills    AMLODIPINE 5 MG Oral Tab [Pharmacy Med Name: amlodipine 5 mg tablet] 90 tablet 0     Sig: TAKE 1 TABLET BY MOUTH DAILY       Hypertensive Medications Protocol Failed - 1/24/2024  1:17 PM        Failed - CMP or BMP in past 6 months     No results found for this or any previous visit (from the past 4392 hour(s)).            Failed - In person appointment or virtual visit in the past 6 months     Recent Outpatient Visits              8 months ago [facility] bladder    [facility] Oleary APRN    Office Visit    8 months ago [facility] urinary bladder    [facility] Arturo Xiong DO    Office Visit    10 months ago [facility] adult [facility]    [facility]    Office Visit    1 year ago [facility]    Office Visit    1 year ago Cystitis    [facility] Arturo Xiong DO    Telemedicine                      Passed - In person appointment in the past 12 or next 3 months     Recent Outpatient Visits              8 months ago [facility] bladder    [facility]    Office Visit    8 months ago [facility] urinary bladder    [facility] Arturo Xiong DO    Office Visit    10 months ago Well adult exam    [facility] Krissy Sevilla APRN    Office Visit    1 year ago [facility]inary retention    [facility]    Office Visit    1 year ago Cystitis     [facility], GabrieleArturo Noland DO    Telemedicine                      Passed - Last BP reading less than 140/90     BP Readings from Last 1 Encounters:   05/11/23 115/73               Passed - EGFRCR or GFRNAA > 50     GFR Evaluation  EGFRCR: 60 , resulted on 3/14/2023            FOLIC ACID 1 MG Oral Tab [Pharmacy Med Name: folic acid 1 mg tablet] 90 tablet 0     Sig: TAKE 1 TABLET BY MOUTH DAILY       There is no refill protocol information for this order       ATORVASTATIN 80 MG Oral Tab [Pharmacy Med Name: atorvastatin 80 mg tablet] 90 tablet 0     Sig: TAKE 1 TABLET BY MOUTH DAILY AT BEDTIME       Cholesterol Medication Protocol Failed - 1/24/2024  1:17 PM        Failed - ALT in past 12 months        Failed - Last ALT < 80     Lab Results   Component Value Date    ALT 37 10/19/2022             Passed - LDL in past 12 months        Passed - Last LDL < 130     Lab Results   Component Value Date    LDL 35 03/20/2023             Passed - In person appointment or virtual visit in the past 12 mos or appointment in next 3 mos     Recent Outpatient Visits              [facility]    [facility], Marion Oleary APRN    Office Visit    8 months ago Atonic urinary bladder    [facility], GabrieleArturo Noland DO    Office Visit    10 months ago Well adult exam    [facility], [place]    Office Visit    1 year ago Urinary retention    [facility], Jerry Thompson MD    Office Visit    1 year ago Cystitis    [facility], Arturo Xiong DO    Telemedicine                            Recent Outpatient Visits              8 months ago [facility] bladder    [facility], Marion Oleary APRN    Office Visit    8 months ago  Atonic urinary bladder    [facility], [address], Arturo Xiong,     Office Visit    10 months ago Well adult exam    [facility], [facility], Krissy Sevilla APRN    Office Visit    1 year ago Urinary retention    [facility], [place] Jerry Oliveros MD    Office Visit    1 year ago Cystitis    [facility], [address], Arturo Xiong,     Telemedicine

## 2024-01-31 RX ORDER — SERTRALINE HYDROCHLORIDE 25 MG/1
75 TABLET, FILM COATED ORAL DAILY
Qty: 90 TABLET | Refills: 0 | OUTPATIENT
Start: 2024-01-31

## 2024-02-21 ENCOUNTER — TELEPHONE (OUTPATIENT)
Dept: FAMILY MEDICINE CLINIC | Facility: CLINIC | Age: 79
End: 2024-02-21

## 2024-02-21 NOTE — TELEPHONE ENCOUNTER
Calderon with [facility] will like to advise Dr. Mariscal to re-certify patient to continue nursing to change catheter every month

## 2024-04-25 ENCOUNTER — TELEPHONE (OUTPATIENT)
Dept: FAMILY MEDICINE CLINIC | Facility: CLINIC | Age: 79
End: 2024-04-25

## 2024-04-25 DIAGNOSIS — N40.1 BENIGN PROSTATIC HYPERPLASIA WITH WEAK URINARY STREAM: Primary | ICD-10-CM

## 2024-04-25 DIAGNOSIS — R39.12 BENIGN PROSTATIC HYPERPLASIA WITH WEAK URINARY STREAM: Primary | ICD-10-CM

## 2024-04-25 NOTE — TELEPHONE ENCOUNTER
Calderon from [facility] called stating that she will be re-certifying the patient for home health services to change his catheter.

## 2024-05-06 ENCOUNTER — TELEPHONE (OUTPATIENT)
Dept: FAMILY MEDICINE CLINIC | Facility: CLINIC | Age: 79
End: 2024-05-06

## 2024-05-06 DIAGNOSIS — N40.0 BENIGN PROSTATIC HYPERPLASIA WITHOUT LOWER URINARY TRACT SYMPTOMS: Primary | ICD-10-CM

## 2024-05-06 NOTE — TELEPHONE ENCOUNTER
Home health orders for catheter placement signed by Dr. Mariscal.     Faxed back and sent to scanning.

## 2024-05-07 ENCOUNTER — TELEPHONE (OUTPATIENT)
Dept: FAMILY MEDICINE CLINIC | Facility: CLINIC | Age: 79
End: 2024-05-07

## 2024-05-07 DIAGNOSIS — E78.2 MIXED HYPERLIPIDEMIA: ICD-10-CM

## 2024-05-09 RX ORDER — FOLIC ACID 1 MG/1
1 TABLET ORAL DAILY
Qty: 90 TABLET | Refills: 0 | Status: SHIPPED | OUTPATIENT
Start: 2024-05-09

## 2024-05-09 RX ORDER — AMLODIPINE BESYLATE 5 MG/1
5 TABLET ORAL DAILY
Qty: 90 TABLET | Refills: 0 | Status: SHIPPED | OUTPATIENT
Start: 2024-05-09

## 2024-05-09 RX ORDER — SERTRALINE HYDROCHLORIDE 25 MG/1
75 TABLET, FILM COATED ORAL DAILY
Qty: 270 TABLET | Refills: 0 | Status: SHIPPED | OUTPATIENT
Start: 2024-05-09

## 2024-05-09 RX ORDER — ATORVASTATIN CALCIUM 80 MG/1
80 TABLET, FILM COATED ORAL NIGHTLY
Qty: 90 TABLET | Refills: 0 | Status: SHIPPED | OUTPATIENT
Start: 2024-05-09

## 2024-05-09 NOTE — TELEPHONE ENCOUNTER
Patient called back.  He is currently residing is assisted living [facility] and does not drive.  Advised patient needs an appointment and labs.  He will need to make transportation arrangements.  [facility]  1 refill pended for signature.  Please advise.

## 2024-05-09 NOTE — TELEPHONE ENCOUNTER
All Assisted Living facilities usually have a physician that comes and patients are able to see them.  He needs to ask one of the staff members at the assisted living facility who this physician is so that they can start seeing him and take care of him.

## 2024-05-09 NOTE — TELEPHONE ENCOUNTER
Patient contacted and made aware of Dr. Mariscal's recommendations and medications sent today as requested. He will ask the staff and he thinks it is Dr. Oliveros - but he will confirm and establish care with the provider where he resides for medication management and care. Patient verbalized understanding. No further questions or concerns at this time.    FYI Dr. Mariscal

## 2024-05-09 NOTE — TELEPHONE ENCOUNTER
Please review. Rx failed/no protocol.    Last set of labs were 03/20/2023.    No future appointments.  Routed to [facility] to call patient and make an appointment.    Requested Prescriptions   Pending Prescriptions Disp Refills    AMLODIPINE 5 MG Oral Tab [Pharmacy Med Name: amlodipine 5 mg tablet] 90 tablet 0     Sig: TAKE 1 TABLET BY MOUTH DAILY       Hypertension Medications Protocol Failed - 5/7/2024  1:13 PM        Failed - CMP or BMP in past 12 months        Failed - EGFRCR or GFRNAA > 50     GFR Evaluation            Passed - Last BP reading less than 140/90     BP Readings from Last 1 Encounters:   05/11/23 115/73               Passed - In person appointment or virtual visit in the past 12 mos or appointment in next 3 mos     Recent Outpatient Visits              11 months ago [facility] bladder    [facility], Marion Oleary APRN    Office Visit    12 months ago [facility] urinary bladder    [facility], Arturo Xiong DO    Office Visit    1 year ago Well adult exam    [facility], Krissy Sevilla APRN    Office Visit    1 year ago Urinary retention    [facility], Jerry Thompson MD    Office Visit    1 year ago Cystitis    [facility], Arturo Xiong DO    Telemedicine                        ATORVASTATIN 80 MG Oral Tab [Pharmacy Med Name: atorvastatin 80 mg tablet] 90 tablet 0     Sig: TAKE 1 TABLET BY MOUTH DAILY AT BEDTIME       Cholesterol Medication Protocol Failed - 5/7/2024  1:13 PM        Failed - ALT < 80     Lab Results   Component Value Date    ALT 37 10/19/2022             Failed - ALT resulted within past year        Failed - Lipid panel within past 12 months     Lab Results   Component Value Date    CHOLEST 105 03/20/2023    TRIG 106 03/20/2023    HDL 50 03/20/2023    LDL 35 03/20/2023     VLDL 14 03/20/2023    NONHDLC 55 03/20/2023             Passed - In person appointment or virtual visit in the past 12 mos or appointment in next 3 mos     Recent Outpatient Visits              11 months ago [facility] bladder    [facility], Marion Oleary APRN    Office Visit    12 months ago [facility] urinary bladder    [facility], Arturo Xiong DO    Office Visit    1 year ago Well adult exam    [facility], Krissy Sevilla APRN    Office Visit    1 year ago Urinary retention    [facility], Jerry Thompson MD    Office Visit    1 year ago Cystitis    [facility], Arturo Xiong DO    Telemedicine                        FOLIC ACID 1 MG Oral Tab [Pharmacy Med Name: folic acid 1 mg tablet] 90 tablet 0     Sig: TAKE 1 TABLET BY MOUTH DAILY       There is no refill protocol information for this order       SERTRALINE 25 MG Oral Tab [Pharmacy Med Name: sertraline 25 mg tablet] 90 tablet 0     Sig: TAKE 3 TABLETS BY MOUTH DAILY       Psychiatric Non-Scheduled (Anti-Anxiety) Failed - 5/7/2024  1:13 PM        Failed - In person appointment or virtual visit in the past 6 mos or appointment in next 3 mos     Recent Outpatient Visits              11 months ago [facility] bladder    [facility], Marion Oleary APRN    Office Visit    12 months ago [facility] urinary bladder    [facility], Arturo Xiong DO    Office Visit    1 year ago Well adult exam    [facility], Krissy Sevilla APRN    Office Visit    1 year ago Urinary retention    [facility], Jerry Thompson MD    Office Visit    1 year ago Cystitis    [facility],  Arturo Xiong DO    Telemedicine                      Failed - Depression Screening completed within the past 12 months               Recent Outpatient Visits              11 months ago [facility] bladder    [facility], [place]    Office Visit    12 months ago [facility] urinary bladder    [facility], Arturo Xiong DO    Office Visit    1 year ago Well adult exam    [facility], Krissy Sevilla APRN    Office Visit    1 year ago Urinary retention    [facility], Jerry Thompson MD    Office Visit    1 year ago Cystitis    [facility], Arturo Xiong DO    Telemedicine

## 2024-06-07 NOTE — TELEPHONE ENCOUNTER
Please review. Protocol Failed; No Protocol    Requested Prescriptions   Pending Prescriptions Disp Refills    VITAMIN D HIGH POTENCY 25 MCG (1000 UT) Oral Cap [Pharmacy Med Name: Vitamin D3 25 mcg (1,000 unit) capsule] 90 capsule 3     Sig: TAKE 1 CAPSULE BY MOUTH DAILY       There is no refill protocol information for this order       B-12 100 MCG Oral Tab [Pharmacy Med Name: WM B-12 100MCG TABS] 90 tablet 3     Sig: Take 1 tablet by mouth daily.       There is no refill protocol information for this order       B-1 100 MG Oral Tab [Pharmacy Med Name: thiamine HCl (vitamin B1) 100 mg tablet] 90 tablet 3     Sig: Take 1 tablet by mouth daily.       There is no refill protocol information for this order       MELATONIN 3 MG Oral Tab [Pharmacy Med Name: melatonin 3 mg tablet] 90 tablet 3     Sig: TAKE 1 TABLET BY MOUTH DAILY AT BEDTIME       There is no refill protocol information for this order              Recent Outpatient Visits              1 year ago Atonic bladder    [facility], [place] Jam[place] APRN    Office Visit    1 year ago Atonic urinary bladder    [facility], Arturo Xiong DO    Office Visit    1 year ago Well adult exam    [facility], Krissy Sevilla APRN    Office Visit    1 year ago Urinary retention    [facility], Jerry Thompson MD    Office Visit    1 year ago Cystitis    [facility], Arturo Xiong DO    Telemedicine

## 2024-06-12 ENCOUNTER — TELEPHONE (OUTPATIENT)
Dept: FAMILY MEDICINE CLINIC | Facility: CLINIC | Age: 79
End: 2024-06-12

## 2024-06-13 RX ORDER — MELATONIN
3 NIGHTLY
Qty: 90 TABLET | Refills: 3 | Status: SHIPPED | OUTPATIENT
Start: 2024-06-13

## 2024-06-13 RX ORDER — METHION/INOS/CHOL BT/B COM/LIV 110MG-86MG
1 CAPSULE ORAL DAILY
Qty: 90 TABLET | Refills: 3 | Status: SHIPPED | OUTPATIENT
Start: 2024-06-13

## 2024-06-13 RX ORDER — PYRIDOXINE HCL (VITAMIN B6) 50 MG
1 TABLET ORAL DAILY
Qty: 90 TABLET | Refills: 3 | Status: SHIPPED | OUTPATIENT
Start: 2024-06-13

## 2024-06-21 ENCOUNTER — TELEPHONE (OUTPATIENT)
Dept: FAMILY MEDICINE CLINIC | Facility: CLINIC | Age: 79
End: 2024-06-21

## 2024-06-21 NOTE — TELEPHONE ENCOUNTER
Calderon from [facility] called requesting to re-certify patient for sercives to continue catheter change services.

## 2024-06-22 NOTE — TELEPHONE ENCOUNTER
I have not seen him in over a year now and I understand he is in a residential home.  Do they not have a physician that sees the residents on a periodic basis that can take over his care?

## 2024-06-24 NOTE — TELEPHONE ENCOUNTER
Called Calderon at [facility] to give doctor's message.  Left message on secured mailbox with below information and to call back    I have not seen him in over a year now and I understand he is in a residential home.  Do they not have a physician that sees the residents on a periodic basis that can take over his care?

## 2024-06-25 ENCOUNTER — TELEPHONE (OUTPATIENT)
Dept: FAMILY MEDICINE CLINIC | Facility: CLINIC | Age: 79
End: 2024-06-25

## 2024-06-25 NOTE — TELEPHONE ENCOUNTER
Calderon from [facility] is calling to advise PCP the patient has independent living at [place] now called [facility] and does not have services of physician at site.    Patient's family advised to schedule an appointment with the provider.      Please see closed telephone encounter 6/21/24.    Will provider re-certify patient for the continued catheter change.    Please call back, okay to leave a message if unable to answer - secure line.

## 2024-06-26 NOTE — TELEPHONE ENCOUNTER
Can we see if Dr. Zelaya or Dr. Olmedo see patients at [facility] periodically as he is not able to make it to the office.  He needs medication refills and periodic evaluation.

## 2024-06-27 NOTE — TELEPHONE ENCOUNTER
Call from Calderon, asking if Dr Mariscal will provide recert orders to continue home health, due for change of suprapubic catheter 7/02.  Advised he needs appointment.  Scheduled:  Future Appointments   Date Time Provider Department Center   6/28/2024 11:00 AM Arturo Mariscal DO Count includes the [facility] STAN Mancera is with him now and confirmed appointment.

## 2024-06-28 ENCOUNTER — TELEPHONE (OUTPATIENT)
Dept: FAMILY MEDICINE CLINIC | Facility: CLINIC | Age: 79
End: 2024-06-28

## 2024-06-28 ENCOUNTER — OFFICE VISIT (OUTPATIENT)
Dept: FAMILY MEDICINE CLINIC | Facility: CLINIC | Age: 79
End: 2024-06-28

## 2024-06-28 VITALS
BODY MASS INDEX: 21.76 KG/M2 | TEMPERATURE: 97 F | WEIGHT: 152 LBS | HEART RATE: 69 BPM | HEIGHT: 70 IN | SYSTOLIC BLOOD PRESSURE: 128 MMHG | DIASTOLIC BLOOD PRESSURE: 82 MMHG

## 2024-06-28 DIAGNOSIS — R39.12 BENIGN PROSTATIC HYPERPLASIA WITH WEAK URINARY STREAM: ICD-10-CM

## 2024-06-28 DIAGNOSIS — R33.9 CHRONIC RETENTION OF URINE: Primary | Chronic | ICD-10-CM

## 2024-06-28 DIAGNOSIS — N31.2 ATONIC URINARY BLADDER: ICD-10-CM

## 2024-06-28 DIAGNOSIS — R39.12 BENIGN PROSTATIC HYPERPLASIA WITH WEAK URINARY STREAM: Primary | ICD-10-CM

## 2024-06-28 DIAGNOSIS — E11.9 TYPE 2 DIABETES MELLITUS WITHOUT COMPLICATION, WITHOUT LONG-TERM CURRENT USE OF INSULIN (HCC): ICD-10-CM

## 2024-06-28 DIAGNOSIS — Z74.2 NEED FOR HOME HEALTH CARE: ICD-10-CM

## 2024-06-28 DIAGNOSIS — N40.1 BENIGN PROSTATIC HYPERPLASIA WITH WEAK URINARY STREAM: Primary | ICD-10-CM

## 2024-06-28 DIAGNOSIS — N40.1 BENIGN PROSTATIC HYPERPLASIA WITH WEAK URINARY STREAM: ICD-10-CM

## 2024-06-28 DIAGNOSIS — I10 ESSENTIAL HYPERTENSION: ICD-10-CM

## 2024-06-28 DIAGNOSIS — Z93.59 SUPRAPUBIC CATHETER (HCC): ICD-10-CM

## 2024-06-28 PROCEDURE — 99214 OFFICE O/P EST MOD 30 MIN: CPT | Performed by: FAMILY MEDICINE

## 2024-06-28 NOTE — TELEPHONE ENCOUNTER
Called Jose for [facility] verified patient name and date of birth.  Reviewed Dr Mariscal's recommendations:  Arturo Mariscal,  Physician Signed 12:10 PM     Copy     Taredwina from [facility] called requesting to re-certify patient for sercives to continue catheter change services.      I saw patient in the office today so please let them know that I will give approval and will recertify services to continue the catheter change for patient.     Dr. Loida Garcia has no further questions.

## 2024-06-28 NOTE — PROGRESS NOTES
Patient ID: Sumeet Keith is a 78 year old male.    HPI  Chief Complaint   Patient presents with    Follow - Up     He lives at [facility] and has his own apartment there.  He states for the most part he is doing quite well.  He does have a suprapubic catheter and this is changed periodically by the visiting nurse.  We have not seen him for some time so he was brought in by the car service.  He states for the most part he feels well.  No chest pain, trouble breathing.  He states he walks around the facility and tries to stay active.  He does not feel particularly depressed or anxious.  He states he is taken himself off most of the medications and actually feels quite good without them.    Wt Readings from Last 6 Encounters:   06/28/24 152 lb (68.9 kg)   05/11/23 144 lb 9.6 oz (65.6 kg)   03/28/23 145 lb (65.8 kg)   03/15/23 148 lb (67.1 kg)   12/10/22 134 lb (60.8 kg)   10/21/22 145 lb 11.2 oz (66.1 kg)       BMI Readings from Last 6 Encounters:   06/28/24 21.81 kg/m²   05/11/23 20.75 kg/m²   03/28/23 20.81 kg/m²   03/15/23 21.24 kg/m²   12/10/22 19.23 kg/m²   10/21/22 20.91 kg/m²       BP Readings from Last 6 Encounters:   06/28/24 128/82   05/11/23 115/73   03/28/23 129/63   03/15/23 134/78   02/15/23 104/64   01/30/23 105/63         Review of Systems  See HPI.  No chest pain, trouble breathing, palpitations.      Medical History:      Past Medical History:    Alcohol dependence (HCC)    Anxiety    Back problem    BPH (benign prostatic hyperplasia)    Carotid artery disease (HCC)    Cataract    Compression fracture of L1 lumbar vertebra (HCC)    Coronary artery disease    Depression    Essential hypertension    Hearing impairment    Not using the hearing aids    High blood pressure    High cholesterol    History of chickenpox    History of measles    Hyperlipidemia    Mood disorder (HCC)    Osteoarthritis    Urinary retention    pt reports daily straight cath    Visual impairment    Reading  glasses       Past Surgical History:   Procedure Laterality Date    Cabg  2007    Cabg      14 yrs ago    Cataract Bilateral 2014    Colonoscopy      Cystourethroscopy  08/07/2019    Kyphoplasty, lumbar, 1 level  10/19/2022    Tonsillectomy  1950          Current Outpatient Medications   Medication Sig Dispense Refill    ibuprofen 200 MG Oral Tab Take 2 tablets (400 mg total) by mouth every 6 (six) hours as needed for Pain.       Allergies:  Allergies   Allergen Reactions    Metoprolol OTHER (SEE COMMENTS)     As stated in Med list in [facility]    Ramipril ANGIOEDEMA     Swelling to upper lip.    Milk-Related Compounds DIARRHEA        Physical Exam:       Physical Exam  Blood pressure 128/82, pulse 69, temperature 97 °F (36.1 °C), height 5' 10\" (1.778 m), weight 152 lb (68.9 kg).  Physical Exam   Constitutional: Patient is oriented to person, place, and time. Patient appears well-developed and well-nourished. No distress.   HENT:   Head: Normocephalic and atraumatic.   Neck: Normal range of motion. Neck supple. No thyromegaly present.   Lymphadenopathy:     Has  no cervical adenopathy.   Cardiovascular: Normal rate, regular rhythm and no murmur heard.  Pulmonary/Chest: Effort normal and breath sounds normal. No respiratory distress.   Neurological: Patient is alert and oriented to person, place, and time.  Able to name the year and the president.  Skin: Skin is warm and dry.   Psychiatric: Patient has a normal mood and affect.  Very pleasant to talk to.  Lower legs: No edema of the legs bilaterally.  Abdominal: Normal appearance and bowel sounds are normal. There is    no tenderness.  There is no rigidity, no rebound, no guarding.    Nursing note and vitals reviewed.           Assessment/Plan:      Diagnoses and all orders for this visit:    Chronic retention of urine  Has an indwelling suprapubic catheter.  I will go ahead and give the approval for the home health nurse to continue to change this.  Need for  home health care  As above  Benign prostatic hyperplasia with weak urinary stream  Has already seen urology  Atonic urinary bladder    Suprapubic catheter (HCC)  Doing well and has no concerns about this.      Referrals (if applicable)  No orders of the defined types were placed in this encounter.        Follow up if symptoms persist.  Take medicine (if given) as prescribed.  Approach to treatment discussed and patient/family member understands and agrees to plan.     No follow-ups on file.        Arturo Mariscal DO  6/28/2024

## 2024-06-28 NOTE — TELEPHONE ENCOUNTER
Calderon from [facility] called requesting to re-certify patient for sercives to continue catheter change services.     I saw patient in the office today so please let them know that I will give approval and will recertify services to continue the catheter change for patient.    Dr. Mariscal

## 2024-07-12 ENCOUNTER — TELEPHONE (OUTPATIENT)
Dept: FAMILY MEDICINE CLINIC | Facility: CLINIC | Age: 79
End: 2024-07-12

## 2024-07-12 DIAGNOSIS — Z43.5 ENCOUNTER FOR ATTENTION TO CYSTOSTOMY (HCC): Primary | ICD-10-CM

## 2024-07-16 NOTE — TELEPHONE ENCOUNTER
Please review.  Protocol failed/No protocol      Requested Prescriptions   Pending Prescriptions Disp Refills    SERTRALINE 25 MG Oral Tab [Pharmacy Med Name: sertraline 25 mg tablet] 90 tablet 0     Sig: TAKE 3 TABLETS (75MG) BY MOUTH DAILY       Psychiatric Non-Scheduled (Anti-Anxiety) Failed - 11/28/2023  3:03 PM        Failed - In person appointment or virtual visit in the past 6 mos or appointment in next 3 mos     Recent Outpatient Visits              6 months ago Atonic bladder    [address], [address], [place], [address], APRN    Office Visit    6 months ago Atonic urinary bladder    [address], [address], [address], [place], DO    Office Visit    8 months ago Well adult exam    [facility], [place] Artis, APRN    Office Visit    10 months ago Urinary retention    [address], [address], Lazaro Galeano MD    Office Visit    1 year ago [facility]arza, [address], [address], [place], DO    Telemedicine                           Recent Outpatient Visits              6 months ago Atonic bladder    [address], [address], [place], [address], APRN    Office Visit    6 months ago Atonic urinary bladder    [address], [address], [address], [place], DO    Office Visit    8 months ago Well adult exam    [facility], [place] Artis, APRN    Office Visit    10 months ago Urinary retention    Warren Chaves MD    Office Visit    1 year ago [address] [id number], [place] Roberts DO    Telemedicine done

## 2024-08-15 ENCOUNTER — TELEPHONE (OUTPATIENT)
Dept: FAMILY MEDICINE CLINIC | Facility: CLINIC | Age: 79
End: 2024-08-15

## 2024-08-15 NOTE — TELEPHONE ENCOUNTER
Brooklyn from [facility] called asking to re certify the patient to continue nursing services

## 2024-08-28 NOTE — TELEPHONE ENCOUNTER
Please call patient to discuss medication based on office visit note below. Not sure which medications he has taken himself off.     Recent office visit on 06/28/2024:  \"He states he is taken himself off most of the medications and actually feels quite good without them.\"

## 2024-08-30 RX ORDER — FOLIC ACID 1 MG/1
1 TABLET ORAL DAILY
Qty: 90 TABLET | Refills: 0 | Status: SHIPPED | OUTPATIENT
Start: 2024-08-30

## 2024-08-30 RX ORDER — AMLODIPINE BESYLATE 5 MG/1
5 TABLET ORAL DAILY
Qty: 90 TABLET | Refills: 0 | Status: SHIPPED | OUTPATIENT
Start: 2024-08-30

## 2024-08-30 RX ORDER — SERTRALINE HYDROCHLORIDE 25 MG/1
75 TABLET, FILM COATED ORAL DAILY
Qty: 270 TABLET | Refills: 0 | Status: SHIPPED | OUTPATIENT
Start: 2024-08-30

## 2024-08-30 RX ORDER — ATORVASTATIN CALCIUM 80 MG/1
80 TABLET, FILM COATED ORAL NIGHTLY
Qty: 90 TABLET | Refills: 0 | Status: SHIPPED | OUTPATIENT
Start: 2024-08-30

## 2024-09-03 RX ORDER — SERTRALINE HYDROCHLORIDE 25 MG/1
75 TABLET, FILM COATED ORAL DAILY
Qty: 270 TABLET | Refills: 0 | OUTPATIENT
Start: 2024-09-03

## 2024-09-06 RX ORDER — SERTRALINE HYDROCHLORIDE 25 MG/1
75 TABLET, FILM COATED ORAL DAILY
Qty: 270 TABLET | Refills: 0 | OUTPATIENT
Start: 2024-09-06

## 2024-09-06 NOTE — TELEPHONE ENCOUNTER
Disp Refills Start End    SERTRALINE 25 MG Oral Tab 270 tablet 0 8/30/2024 --    Sig - Route: TAKE 3 TABLETS BY MOUTH DAILY - Oral    Sent to pharmacy as: Sertraline HCl 25 MG Oral Tablet (Zoloft)    Notes to Pharmacy: NA    E-Prescribing Status: Receipt confirmed by pharmacy (8/30/2024 12:36 PM CDT)    Renewals    Renewal provider: Arturo Mariscal DO         Pharmacy    [facility] - [place], [place] - [address] [phone], [phone]   90 day supply

## 2024-09-10 RX ORDER — SERTRALINE HYDROCHLORIDE 25 MG/1
75 TABLET, FILM COATED ORAL DAILY
Qty: 270 TABLET | Refills: 0 | OUTPATIENT
Start: 2024-09-10

## 2024-09-10 NOTE — TELEPHONE ENCOUNTER
Refill passed per [facility] protocol.  Requested Prescriptions   Pending Prescriptions Disp Refills    SERTRALINE 25 MG Oral Tab [Pharmacy Med Name: SERTRALINE HCL 25 MG TABLET] 270 tablet 0     Sig: TAKE 3 TABLETS BY MOUTH DAILY       Psychiatric Non-Scheduled (Anti-Anxiety) Passed - 9/7/2024  7:21 AM        Passed - In person appointment or virtual visit in the past 6 mos or appointment in next 3 mos     Recent Outpatient Visits              2 months ago Chronic retention of urine    [facility], Arturo Xiong,     Office Visit    1 year ago [facility] bladder    [facility]    Office Visit    1 year ago [facility] urinary bladder    [facility] Arturo Xiong,     Office Visit    1 year ago Well adult exam    [facility], Krissy Sevilla APRN    Office Visit    1 year ago [facility]    [facility]                      Passed - Depression Screening completed within the past 12 months             Recent Outpatient Visits              2 months ago Chronic retention of urine    [facility], Arturo Xiong,     Office Visit    1 year ago [facility] bladder    [facility] [place] [facility]    Office Visit    1 year ago [facility] urinary bladder    [facility], Arturo Xiong,     Office Visit    1 year ago Well adult exam    [facility]    Office Visit    1 year ago Urinary retention    [facility] [place]    Office Visit

## 2024-09-16 NOTE — TELEPHONE ENCOUNTER
Please review. Protocol Failed; No Protocol    Requested Prescriptions   Pending Prescriptions Disp Refills    B-12 100 MCG Oral Tab [Pharmacy Med Name:  B-12 100MCG TABS] 90 tablet 0     Sig: TAKE 1 TABLET BY MOUTH DAILY       There is no refill protocol information for this order              Recent Outpatient Visits              2 months ago Chronic retention of urine    [facility], Arturo Xiong,     Office Visit    1 year ago [facility] bladder    [facility] Marion Oleary APRN    Office Visit    1 year ago [facility] urinary bladder    [facility], Arturo Xiong DO    Office Visit    1 year ago Well adult exam    [facility], Krissy Sevilla APRN    Office Visit    1 year ago [facility] retention    [facility]    [facility]

## 2024-09-17 RX ORDER — PYRIDOXINE HCL (VITAMIN B6) 50 MG
1 TABLET ORAL DAILY
Qty: 90 TABLET | Refills: 0 | Status: SHIPPED | OUTPATIENT
Start: 2024-09-17

## 2024-09-18 ENCOUNTER — TELEPHONE (OUTPATIENT)
Dept: FAMILY MEDICINE CLINIC | Facility: CLINIC | Age: 79
End: 2024-09-18

## 2024-09-18 DIAGNOSIS — Z43.5 ATTENTION TO CYSTOSTOMY (HCC): Primary | ICD-10-CM

## 2024-09-23 RX ORDER — PYRIDOXINE HCL (VITAMIN B6) 50 MG
1 TABLET ORAL DAILY
Qty: 90 TABLET | Refills: 0 | OUTPATIENT
Start: 2024-09-23

## 2024-09-30 RX ORDER — PYRIDOXINE HCL (VITAMIN B6) 50 MG
1 TABLET ORAL DAILY
Qty: 90 TABLET | Refills: 0 | OUTPATIENT
Start: 2024-09-30

## 2024-10-02 RX ORDER — PYRIDOXINE HCL (VITAMIN B6) 50 MG
1 TABLET ORAL DAILY
Qty: 90 TABLET | Refills: 0 | OUTPATIENT
Start: 2024-10-02

## 2024-10-04 RX ORDER — PYRIDOXINE HCL (VITAMIN B6) 50 MG
1 TABLET ORAL DAILY
Qty: 90 TABLET | Refills: 0 | OUTPATIENT
Start: 2024-10-04

## 2024-10-15 NOTE — PLAN OF CARE
24      Mary Grace Ch   1950      FYI to PCP-Patient has not responded to reminders to schedule colonoscopy        [facility] [address]  [address]  [facility] [address]  Phone: [phone]                 Problem: Patient Centered Care  Goal: Patient preferences are identified and integrated in the patient's plan of care  Description  Interventions:  - What would you like us to know as we care for you?  I have been experiencing a weak stream of urine and \ vasoactive medications to optimize hemodynamic stability  - Monitor arterial and/or venous puncture sites for bleeding and/or hematoma  - Assess quality of pulses, skin color and temperature  - Assess for signs of decreased coronary artery perfusion - ex. Administer supportive blood products/factors as ordered and appropriate  Outcome: Progressing  Goal: Free from bleeding injury  Description  (Example usage: patient with low platelets)  INTERVENTIONS:  - Avoid intramuscular injections, enemas and rectal me

## 2024-10-22 ENCOUNTER — TELEPHONE (OUTPATIENT)
Dept: FAMILY MEDICINE CLINIC | Facility: CLINIC | Age: 79
End: 2024-10-22

## 2024-10-22 NOTE — TELEPHONE ENCOUNTER
Calderon wanted to ask doctor if it is ok to re certify patient next week to continue nursing services so they can change patient catheter

## 2024-11-04 ENCOUNTER — TELEPHONE (OUTPATIENT)
Dept: FAMILY MEDICINE CLINIC | Facility: CLINIC | Age: 79
End: 2024-11-04

## 2024-11-04 DIAGNOSIS — Z43.5 ATTENTION TO CYSTOSTOMY (HCC): Primary | ICD-10-CM

## 2024-11-20 NOTE — TELEPHONE ENCOUNTER
Please review; protocol failed/No Protocol    No Active/Future labs pended     Future Appointments   Date Time Provider Department Center   11/21/2024  1:30 PM Arturo Mariscal DO [facility]       Requested Prescriptions   Pending Prescriptions Disp Refills    AMLODIPINE 5 MG Oral Tab [Pharmacy Med Name: AMLODIPINE BESYLATE 5 MG TAB] 90 tablet 0     Sig: take 1 tablet BY MOUTH EVERY DAY       Hypertension Medications Protocol Failed - 11/20/2024  4:18 PM        Failed - CMP or BMP in past 12 months        Failed - EGFRCR or GFRNAA > 50     GFR Evaluation            Passed - Last BP reading less than 140/90     BP Readings from Last 1 Encounters:   06/28/24 128/82               Passed - In person appointment or virtual visit in the past 12 mos or appointment in next 3 mos     Recent Outpatient Visits              4 months ago Chronic retention of urine    [facility], Arturo Xiong DO    Office Visit    1 year ago Atonic bladder    [facility], Marion Oleary APRN    Office Visit    1 year ago Atonic urinary bladder    [facility], Arturo Xiong DO    Office Visit    1 year ago Well adult exam    [facility] [place]    Office Visit    1 year ago Urinary retention    [facility], Jerry Thompson MD    Office Visit          Future Appointments         Provider Department Appt Notes    Tomorrow Arturo Mariscal DO [facility], [place] rash                      ATORVASTATIN 80 MG Oral Tab [Pharmacy Med Name: ATORVASTATIN 80 MG TABLET] 90 tablet 0     Sig: TAKE 1 TABLET BY MOUTH every night at bedtime       Cholesterol Medication Protocol Failed - 11/20/2024  4:18 PM        Failed - ALT < 80     Lab Results   Component Value Date    ALT 37 10/19/2022              Failed - ALT resulted within past year        Failed - Lipid panel within past 12 months     Lab Results   Component Value Date    CHOLEST 105 03/20/2023    TRIG 106 03/20/2023    HDL 50 03/20/2023    LDL 35 03/20/2023    VLDL 14 03/20/2023    NONHDLC 55 03/20/2023             Passed - In person appointment or virtual visit in the past 12 mos or appointment in next 3 mos     Recent Outpatient Visits              4 months ago [facility]    [facility]    1 year ago [facility] bladder    [facility]    Office Visit    1 year ago [facility] urinary bladder    [facility] Arturo Xiong DO    Office Visit    1 year ago Well adult exam    [facility]    Office Visit    1 year ago [facility]inary retention    [facility]    Office Visit          Future Appointments         Provider Department Appt Notes    Tomorrow Arturo Mariscal DO [facility], [place] rash                      FOLIC ACID 1 MG Oral Tab [Pharmacy Med Name: FOLIC ACID 1 MG TABLET] 90 tablet 0     Sig: take 1 tablet BY MOUTH EVERY DAY       There is no refill protocol information for this order        Future Appointments         Provider Department Appt Notes    Tomorrow Arturo Mariscal DO [facility], [place] rash          Recent Outpatient Visits              4 months ago Chronic retention of urine    [facility] Arturo Xiong DO    Office Visit    [facility]    Office Visit    1 year ago [facility]  [place], Arturo Xiong DO    Office Visit    1 year ago Well adult exam    [facility], Krissy Sevilla APRN    Office Visit    1 year ago Urinary retention    [facility]    Office Visit

## 2024-11-23 RX ORDER — AMLODIPINE BESYLATE 5 MG/1
5 TABLET ORAL DAILY
Qty: 90 TABLET | Refills: 0 | Status: SHIPPED | OUTPATIENT
Start: 2024-11-23

## 2024-11-23 RX ORDER — ATORVASTATIN CALCIUM 80 MG/1
80 TABLET, FILM COATED ORAL NIGHTLY
Qty: 90 TABLET | Refills: 0 | Status: SHIPPED | OUTPATIENT
Start: 2024-11-23

## 2024-11-23 RX ORDER — FOLIC ACID 1 MG/1
1 TABLET ORAL DAILY
Qty: 90 TABLET | Refills: 0 | Status: SHIPPED | OUTPATIENT
Start: 2024-11-23

## 2024-11-30 RX ORDER — SERTRALINE HYDROCHLORIDE 25 MG/1
75 TABLET, FILM COATED ORAL DAILY
Qty: 270 TABLET | Refills: 0 | Status: SHIPPED | OUTPATIENT
Start: 2024-11-30

## 2024-11-30 NOTE — TELEPHONE ENCOUNTER
Refill passed per [facility] protocol.     Requested Prescriptions   Pending Prescriptions Disp Refills    SERTRALINE 25 MG Oral Tab [Pharmacy Med Name: SERTRALINE HCL 25 MG TABLET] 270 tablet 0     Sig: TAKE 3 TABLETS BY MOUTH DAILY       Psychiatric Non-Scheduled (Anti-Anxiety) Passed - 11/30/2024  8:23 AM        Passed - In person appointment or virtual visit in the past 6 mos or appointment in next 3 mos     Recent Outpatient Visits              5 months ago Chronic retention of urine    [facility], Arturo Xiong DO    Office Visit    1 year ago [facility]    [facility]    1 year ago [facility] urinary bladder    [facility], Arturo Xiong DO    Office Visit    1 year ago Well adult exam    [facility], [place]rissy Dennison APRN    Office Visit    1 year ago [facility]    [facility] Visit                      Passed - Depression Screening completed within the past 12 months

## 2024-12-05 NOTE — TELEPHONE ENCOUNTER
Please review. Protocol Failed; No Protocol    Requested Prescriptions   Pending Prescriptions Disp Refills    B-12 100 MCG Oral Tab [Pharmacy Med Name:  B-12 100MCG TABS] 90 tablet 0     Sig: Take 1 tablet by mouth daily.       There is no refill protocol information for this order              Recent Outpatient Visits              5 months ago Chronic retention of urine    [facility], Arturo Xiong,     Office Visit    1 year ago [facility] bladder    [facility]    Office Visit    1 year ago [facility] urinary bladder    [facility], Arturo Xiong DO    Office Visit    1 year ago Well adult exam    [facility], Krissy Sevilla APRN    Office Visit    1 year ago [facility]inary retention    [facility]    Office Visit

## 2024-12-09 RX ORDER — PYRIDOXINE HCL (VITAMIN B6) 50 MG
1 TABLET ORAL DAILY
Qty: 90 TABLET | Refills: 0 | Status: SHIPPED | OUTPATIENT
Start: 2024-12-09

## 2024-12-11 NOTE — TELEPHONE ENCOUNTER
Calderon from home health care would like to know if doctor will recertify patient for home care services.

## 2024-12-12 NOTE — TELEPHONE ENCOUNTER
PLEASE ARRIVE AT 1:30PM ON 3/26/2021      Take the following medications the morning of surgery:  GABAPENTIN AND PERCOCET    If you are on prescription narcotic pain medication to control your pain you may also take that medication the morning of surgery.    General Instructions:  • Do not eat solid food after midnight the night before surgery.  • You may drink clear liquids day of surgery but must stop at least one hour before your hospital arrival time.  • It is beneficial for you to have a clear drink that contains carbohydrates the day of surgery.  We suggest a 12 to 20 ounce bottle of Gatorade or Powerade for non-diabetic patients or a 12 to 20 ounce bottle of G2 or Powerade Zero for diabetic patients. (Pediatric patients, are not advised to drink a 12 to 20 ounce carbohydrate drink)    Clear liquids are liquids you can see through.  Nothing red in color.     Plain water                               Sports drinks  Sodas                                   Gelatin (Jell-O)  Fruit juices without pulp such as white grape juice and apple juice  Popsicles that contain no fruit or yogurt  Tea or coffee (no cream or milk added)  Gatorade / Powerade  G2 / Powerade Zero    • Infants may have breast milk up to four hours before surgery.  • Infants drinking formula may drink formula up to six hours before surgery.   • Patients who avoid smoking, chewing tobacco and alcohol for 4 weeks prior to surgery have a reduced risk of post-operative complications.  Quit smoking as many days before surgery as you can.  • Do not smoke, use chewing tobacco or drink alcohol the day of surgery.   • If applicable bring your C-PAP/ BI-PAP machine.  • Bring any papers given to you in the doctor’s office.  • Wear clean comfortable clothes.  • Do not wear contact lenses, false eyelashes or make-up.  Bring a case for your glasses.   • Bring crutches or walker if applicable.  • Remove all piercings.  Leave jewelry and any other valuables at  Yes please give verbal approval of recertification for home health.   home.  • Hair extensions with metal clips must be removed prior to surgery.  • The Pre-Admission Testing nurse will instruct you to bring medications if unable to obtain an accurate list in Pre-Admission Testing.            Preventing a Surgical Site Infection:  • For 2 to 3 days before surgery, avoid shaving with a razor because the razor can irritate skin and make it easier to develop an infection.    • Any areas of open skin can increase the risk of a post-operative wound infection by allowing bacteria to enter and travel throughout the body.  Notify your surgeon if you have any skin wounds / rashes even if it is not near the expected surgical site.  The area will need assessed to determine if surgery should be delayed until it is healed.  • The night prior to surgery shower using a fresh bar of anti-bacterial soap (such as Dial) and clean washcloth.  Sleep in a clean bed with clean clothing.  Do not allow pets to sleep with you.  • Shower on the morning of surgery using a fresh bar of anti-bacterial soap (such as Dial) and clean washcloth.  Dry with a clean towel and dress in clean clothing.  • Ask your surgeon if you will be receiving antibiotics prior to surgery.  • Make sure you, your family, and all healthcare providers clean their hands with soap and water or an alcohol based hand  before caring for you or your wound.    Day of surgery:  Your arrival time is approximately two hours before your scheduled surgery time.  Upon arrival, a Pre-op nurse and Anesthesiologist will review your health history, obtain vital signs, and answer questions you may have.  The only belongings needed at this time will be a list of your home medications and if applicable your C-PAP/BI-PAP machine.  A Pre-op nurse will start an IV and you may receive medication in preparation for surgery, including something to help you relax.     Please be aware that surgery does come with discomfort.  We want to make every effort to  control your discomfort so please discuss any uncontrolled symptoms with your nurse.   Your doctor will most likely have prescribed pain medications.      If you are going home after surgery you will receive individualized written care instructions before being discharged.  A responsible adult must drive you to and from the hospital on the day of your surgery and stay with you for 24 hours.  Discharge prescriptions can be filled by the hospital pharmacy during regular pharmacy hours.  If you are having surgery late in the day/evening your prescription may be e-prescribed to your pharmacy.  Please verify your pharmacy hours or chose a 24 hour pharmacy to avoid not having access to your prescription because your pharmacy has closed for the day.    If you are staying overnight following surgery, you will be transported to your hospital room following the recovery period.  [facility] has all private rooms.    If you have any questions please call Pre-Admission Testing at [phone].  Deductibles and co-payments are collected on the day of service. Please be prepared to pay the required co-pay, deductible or deposit on the day of service as defined by your plan.    Patient Education for Self-Quarantine Process    Following your COVID testing, we strongly recommend that you do not leave your home after you have been tested for COVID except to get medical care. This includes not going to work, school or to public areas.  If this is not possible for you to do please limit your activities to only required outings.  Be sure to wear a mask when you are with other people, practice social distancing and wash your hands frequently.      The following items provide additional details to keep you safe.  • Wash your hands with soap and water frequently for at least 20 seconds.   • Avoid touching your eyes, nose and mouth with unwashed hands.  • Do not share anything - utensils, towels, food from the same bowl.    • Have your own utensils, drinking glass, dishes, towels and bedding.   • Do not have visitors.   • Do use FaceTime to stay in touch with family and friends.  • You should stay in a specific room away from others if possible.   • Stay at least 6 feet away from others in the home if you cannot have a dedicated room to yourself.   • Do not snuggle with your pet. While the CDC says there is no evidence that pets can spread COVID-19 or be infected from humans, it is probably best to avoid “petting, snuggling, being kissed or licked and sharing food (during self-quarantine)”, according to the CDC.   • Sanitize household surfaces daily. Include all high touch areas (door handles, light switches, phones, countertops, etc.)  • Do not share a bathroom with others, if possible.   • Wear a mask around others in your home if you are unable to stay in a separate room or 6 feet apart. If  you are unable to wear a mask, have your family member wear a mask if they must be within 6 feet of you.   Call your surgeon immediately if you experience any of the following symptoms:  • Sore Throat  • Shortness of Breath or difficulty breathing  • Cough  • Chills  • Body soreness or muscle pain  • Headache  • Fever  • New loss of taste or smell  • Do not arrive for your surgery ill.  Your procedure will need to be rescheduled to another time.  You will need to call your physician before the day of surgery to avoid any unnecessary exposure to hospital staff as well as other patients.

## 2024-12-12 NOTE — TELEPHONE ENCOUNTER
Home health was called and given verbal for orders. Taresa was left message to call office back if any further questions.

## 2024-12-17 ENCOUNTER — MED REC SCAN ONLY (OUTPATIENT)
Dept: FAMILY MEDICINE CLINIC | Facility: CLINIC | Age: 79
End: 2024-12-17

## 2024-12-17 ENCOUNTER — TELEPHONE (OUTPATIENT)
Dept: FAMILY MEDICINE CLINIC | Facility: CLINIC | Age: 79
End: 2024-12-17

## 2024-12-17 DIAGNOSIS — I25.10 ATHEROSCLEROSIS OF NATIVE CORONARY ARTERY OF NATIVE HEART WITHOUT ANGINA PECTORIS: Primary | ICD-10-CM

## 2024-12-17 NOTE — TELEPHONE ENCOUNTER
Sent signed physician order #[id number] to [facility] fax# [phone]. Confirmation rec'd.

## 2025-01-10 ENCOUNTER — MED REC SCAN ONLY (OUTPATIENT)
Dept: FAMILY MEDICINE CLINIC | Facility: CLINIC | Age: 80
End: 2025-01-10

## 2025-01-11 ENCOUNTER — TELEPHONE (OUTPATIENT)
Dept: FAMILY MEDICINE CLINIC | Facility: CLINIC | Age: 80
End: 2025-01-11

## 2025-01-11 DIAGNOSIS — N40.1 BPH WITH OBSTRUCTION/LOWER URINARY TRACT SYMPTOMS: Primary | ICD-10-CM

## 2025-01-11 DIAGNOSIS — N13.8 BPH WITH OBSTRUCTION/LOWER URINARY TRACT SYMPTOMS: Primary | ICD-10-CM

## 2025-01-19 ENCOUNTER — HOSPITAL ENCOUNTER (INPATIENT)
Facility: HOSPITAL | Age: 80
LOS: 6 days | Discharge: HOME | End: 2025-01-25
Attending: EMERGENCY MEDICINE | Admitting: INTERNAL MEDICINE
Payer: MEDICARE

## 2025-01-19 ENCOUNTER — APPOINTMENT (OUTPATIENT)
Dept: CT IMAGING | Facility: HOSPITAL | Age: 80
End: 2025-01-19
Attending: EMERGENCY MEDICINE
Payer: MEDICARE

## 2025-01-19 DIAGNOSIS — N17.9 AKI (ACUTE KIDNEY INJURY): ICD-10-CM

## 2025-01-19 DIAGNOSIS — N39.0 URINARY TRACT INFECTION WITH HEMATURIA, SITE UNSPECIFIED: ICD-10-CM

## 2025-01-19 DIAGNOSIS — R31.9 URINARY TRACT INFECTION WITH HEMATURIA, SITE UNSPECIFIED: ICD-10-CM

## 2025-01-19 DIAGNOSIS — R33.9 URINARY RETENTION: Primary | ICD-10-CM

## 2025-01-19 LAB
ALBUMIN SERPL-MCNC: 3.8 G/DL (ref 3.2–4.8)
ALBUMIN SERPL-MCNC: 4.5 G/DL (ref 3.2–4.8)
ALBUMIN/GLOB SERPL: 1.7 {RATIO} (ref 1–2)
ALP LIVER SERPL-CCNC: 113 U/L
ALP LIVER SERPL-CCNC: 94 U/L
ALT SERPL-CCNC: 27 U/L
ALT SERPL-CCNC: 34 U/L
ANION GAP SERPL CALC-SCNC: 16 MMOL/L (ref 0–18)
ANION GAP SERPL CALC-SCNC: 19 MMOL/L (ref 0–18)
APTT PPP: 28.7 SECONDS (ref 23–36)
AST SERPL-CCNC: 37 U/L (ref ?–34)
AST SERPL-CCNC: 41 U/L (ref ?–34)
BASOPHILS # BLD AUTO: 0.03 X10(3) UL (ref 0–0.2)
BASOPHILS NFR BLD AUTO: 0.6 %
BILIRUB DIRECT SERPL-MCNC: 0.3 MG/DL (ref ?–0.3)
BILIRUB SERPL-MCNC: 0.4 MG/DL (ref 0.2–1.1)
BILIRUB SERPL-MCNC: 0.8 MG/DL (ref 0.2–1.1)
BILIRUB UR QL: NEGATIVE
BUN BLD-MCNC: 84 MG/DL (ref 9–23)
BUN BLD-MCNC: 91 MG/DL (ref 9–23)
BUN/CREAT SERPL: 25.9 (ref 10–20)
BUN/CREAT SERPL: 27 (ref 10–20)
CALCIUM BLD-MCNC: 10 MG/DL (ref 8.7–10.4)
CALCIUM BLD-MCNC: 8.7 MG/DL (ref 8.7–10.4)
CHLORIDE SERPL-SCNC: 102 MMOL/L (ref 98–112)
CHLORIDE SERPL-SCNC: 108 MMOL/L (ref 98–112)
CO2 SERPL-SCNC: 16 MMOL/L (ref 21–32)
CO2 SERPL-SCNC: 16 MMOL/L (ref 21–32)
COLOR UR: YELLOW
CREAT BLD-MCNC: 3.11 MG/DL
CREAT BLD-MCNC: 3.51 MG/DL
DEPRECATED RDW RBC AUTO: 47.5 FL (ref 35.1–46.3)
EGFRCR SERPLBLD CKD-EPI 2021: 17 ML/MIN/1.73M2 (ref 60–?)
EGFRCR SERPLBLD CKD-EPI 2021: 20 ML/MIN/1.73M2 (ref 60–?)
EOSINOPHIL # BLD AUTO: 0 X10(3) UL (ref 0–0.7)
EOSINOPHIL NFR BLD AUTO: 0 %
ERYTHROCYTE [DISTWIDTH] IN BLOOD BY AUTOMATED COUNT: 13.9 % (ref 11–15)
FLUAV + FLUBV RNA SPEC NAA+PROBE: NEGATIVE
FLUAV + FLUBV RNA SPEC NAA+PROBE: NEGATIVE
GLOBULIN PLAS-MCNC: 2.2 G/DL (ref 2–3.5)
GLUCOSE BLD-MCNC: 150 MG/DL (ref 70–99)
GLUCOSE BLD-MCNC: 209 MG/DL (ref 70–99)
GLUCOSE BLDC GLUCOMTR-MCNC: 172 MG/DL (ref 70–99)
GLUCOSE UR-MCNC: NORMAL MG/DL
HCT VFR BLD AUTO: 42.2 %
HGB BLD-MCNC: 13.9 G/DL
IMM GRANULOCYTES # BLD AUTO: 0.01 X10(3) UL (ref 0–1)
IMM GRANULOCYTES NFR BLD: 0.2 %
INR BLD: 1.1 (ref 0.8–1.2)
LACTATE SERPL-SCNC: 2.2 MMOL/L (ref 0.5–2)
LACTATE SERPL-SCNC: 6.9 MMOL/L (ref 0.5–2)
LEUKOCYTE ESTERASE UR QL STRIP.AUTO: 500
LYMPHOCYTES # BLD AUTO: 0.83 X10(3) UL (ref 1–4)
LYMPHOCYTES NFR BLD AUTO: 17.8 %
MCH RBC QN AUTO: 30.8 PG (ref 26–34)
MCHC RBC AUTO-ENTMCNC: 32.9 G/DL (ref 31–37)
MCV RBC AUTO: 93.6 FL
MONOCYTES # BLD AUTO: 0.04 X10(3) UL (ref 0.1–1)
MONOCYTES NFR BLD AUTO: 0.9 %
MRSA DNA SPEC QL NAA+PROBE: POSITIVE
NEUTROPHILS # BLD AUTO: 3.76 X10 (3) UL (ref 1.5–7.7)
NEUTROPHILS # BLD AUTO: 3.76 X10(3) UL (ref 1.5–7.7)
NEUTROPHILS NFR BLD AUTO: 80.5 %
NITRITE UR QL STRIP.AUTO: NEGATIVE
OSMOLALITY SERPL CALC.SUM OF ELEC: 318 MOSM/KG (ref 275–295)
OSMOLALITY SERPL CALC.SUM OF ELEC: 318 MOSM/KG (ref 275–295)
PH UR: 8 [PH] (ref 5–8)
PLATELET # BLD AUTO: 276 10(3)UL (ref 150–450)
POTASSIUM SERPL-SCNC: 3.3 MMOL/L (ref 3.5–5.1)
POTASSIUM SERPL-SCNC: 4.2 MMOL/L (ref 3.5–5.1)
PROT SERPL-MCNC: 6 G/DL (ref 5.7–8.2)
PROT SERPL-MCNC: 7.5 G/DL (ref 5.7–8.2)
PROT UR-MCNC: 100 MG/DL
PROTHROMBIN TIME: 14.9 SECONDS (ref 11.6–14.8)
RBC # BLD AUTO: 4.51 X10(6)UL
RBC #/AREA URNS AUTO: >10 /HPF
RSV RNA SPEC NAA+PROBE: NEGATIVE
SARS-COV-2 RNA RESP QL NAA+PROBE: NOT DETECTED
SODIUM SERPL-SCNC: 137 MMOL/L (ref 136–145)
SODIUM SERPL-SCNC: 140 MMOL/L (ref 136–145)
SP GR UR STRIP: 1.01 (ref 1–1.03)
UROBILINOGEN UR STRIP-ACNC: NORMAL
WBC # BLD AUTO: 4.7 X10(3) UL (ref 4–11)
WBC #/AREA URNS AUTO: >50 /HPF

## 2025-01-19 PROCEDURE — 74176 CT ABD & PELVIS W/O CONTRAST: CPT | Performed by: EMERGENCY MEDICINE

## 2025-01-19 RX ORDER — FOLIC ACID 1 MG/1
1 TABLET ORAL DAILY
Status: DISCONTINUED | OUTPATIENT
Start: 2025-01-20 | End: 2025-01-25

## 2025-01-19 RX ORDER — UBIDECARENONE 75 MG
100 CAPSULE ORAL DAILY
Status: DISCONTINUED | OUTPATIENT
Start: 2025-01-20 | End: 2025-01-25

## 2025-01-19 RX ORDER — PYRIDOXINE HCL (VITAMIN B6) 50 MG
1 TABLET ORAL DAILY
Status: DISCONTINUED | OUTPATIENT
Start: 2025-01-19 | End: 2025-01-19 | Stop reason: SDUPTHER

## 2025-01-19 RX ORDER — HEPARIN SODIUM 5000 [USP'U]/ML
5000 INJECTION, SOLUTION INTRAVENOUS; SUBCUTANEOUS EVERY 12 HOURS SCHEDULED
Status: DISCONTINUED | OUTPATIENT
Start: 2025-01-20 | End: 2025-01-25

## 2025-01-19 RX ORDER — ACETAMINOPHEN 500 MG
500 TABLET ORAL EVERY 4 HOURS PRN
Status: DISCONTINUED | OUTPATIENT
Start: 2025-01-19 | End: 2025-01-25

## 2025-01-19 RX ORDER — ATORVASTATIN CALCIUM 80 MG/1
80 TABLET, FILM COATED ORAL NIGHTLY
Status: DISCONTINUED | OUTPATIENT
Start: 2025-01-19 | End: 2025-01-25

## 2025-01-19 RX ORDER — SODIUM CHLORIDE, SODIUM LACTATE, POTASSIUM CHLORIDE, CALCIUM CHLORIDE 600; 310; 30; 20 MG/100ML; MG/100ML; MG/100ML; MG/100ML
INJECTION, SOLUTION INTRAVENOUS CONTINUOUS
Status: DISCONTINUED | OUTPATIENT
Start: 2025-01-19 | End: 2025-01-21

## 2025-01-20 ENCOUNTER — APPOINTMENT (OUTPATIENT)
Dept: CV DIAGNOSTICS | Facility: HOSPITAL | Age: 80
End: 2025-01-20
Attending: INTERNAL MEDICINE
Payer: MEDICARE

## 2025-01-20 ENCOUNTER — APPOINTMENT (OUTPATIENT)
Dept: ULTRASOUND IMAGING | Facility: HOSPITAL | Age: 80
End: 2025-01-20
Attending: INTERNAL MEDICINE
Payer: MEDICARE

## 2025-01-20 ENCOUNTER — APPOINTMENT (OUTPATIENT)
Dept: CT IMAGING | Facility: HOSPITAL | Age: 80
End: 2025-01-20
Attending: INTERNAL MEDICINE
Payer: MEDICARE

## 2025-01-20 LAB
ANION GAP SERPL CALC-SCNC: 12 MMOL/L (ref 0–18)
ATRIAL RATE: 79 BPM
BUN BLD-MCNC: 75 MG/DL (ref 9–23)
BUN/CREAT SERPL: 26.5 (ref 10–20)
CALCIUM BLD-MCNC: 8.6 MG/DL (ref 8.7–10.4)
CHLORIDE SERPL-SCNC: 111 MMOL/L (ref 98–112)
CO2 SERPL-SCNC: 16 MMOL/L (ref 21–32)
CREAT BLD-MCNC: 2.83 MG/DL
DEPRECATED RDW RBC AUTO: 47.8 FL (ref 35.1–46.3)
EGFRCR SERPLBLD CKD-EPI 2021: 22 ML/MIN/1.73M2 (ref 60–?)
ERYTHROCYTE [DISTWIDTH] IN BLOOD BY AUTOMATED COUNT: 13.9 % (ref 11–15)
GLUCOSE BLD-MCNC: 167 MG/DL (ref 70–99)
HCT VFR BLD AUTO: 34.1 %
HGB BLD-MCNC: 11.4 G/DL
LACTATE SERPL-SCNC: 1.1 MMOL/L (ref 0.5–2)
LACTATE SERPL-SCNC: 2.1 MMOL/L (ref 0.5–2)
LACTATE SERPL-SCNC: 2.1 MMOL/L (ref 0.5–2)
MAGNESIUM SERPL-MCNC: 2 MG/DL (ref 1.6–2.6)
MCH RBC QN AUTO: 31.5 PG (ref 26–34)
MCHC RBC AUTO-ENTMCNC: 33.4 G/DL (ref 31–37)
MCV RBC AUTO: 94.2 FL
OSMOLALITY SERPL CALC.SUM OF ELEC: 314 MOSM/KG (ref 275–295)
P AXIS: 21 DEGREES
P-R INTERVAL: 160 MS
PLATELET # BLD AUTO: 180 10(3)UL (ref 150–450)
POTASSIUM SERPL-SCNC: 3.6 MMOL/L (ref 3.5–5.1)
Q-T INTERVAL: 362 MS
QRS DURATION: 72 MS
QTC CALCULATION (BEZET): 415 MS
R AXIS: 53 DEGREES
RBC # BLD AUTO: 3.62 X10(6)UL
SODIUM SERPL-SCNC: 139 MMOL/L (ref 136–145)
T AXIS: 89 DEGREES
VENTRICULAR RATE: 79 BPM
WBC # BLD AUTO: 12.7 X10(3) UL (ref 4–11)

## 2025-01-20 PROCEDURE — 99233 SBSQ HOSP IP/OBS HIGH 50: CPT | Performed by: HOSPITALIST

## 2025-01-20 PROCEDURE — 76770 US EXAM ABDO BACK WALL COMP: CPT | Performed by: INTERNAL MEDICINE

## 2025-01-20 PROCEDURE — 93306 TTE W/DOPPLER COMPLETE: CPT | Performed by: INTERNAL MEDICINE

## 2025-01-20 PROCEDURE — 99223 1ST HOSP IP/OBS HIGH 75: CPT | Performed by: INTERNAL MEDICINE

## 2025-01-20 PROCEDURE — 70450 CT HEAD/BRAIN W/O DYE: CPT | Performed by: INTERNAL MEDICINE

## 2025-01-20 RX ORDER — LORAZEPAM 1 MG/1
2 TABLET ORAL
Status: DISCONTINUED | OUTPATIENT
Start: 2025-01-20 | End: 2025-01-25

## 2025-01-20 RX ORDER — LORAZEPAM 1 MG/1
1 TABLET ORAL
Status: DISCONTINUED | OUTPATIENT
Start: 2025-01-20 | End: 2025-01-25

## 2025-01-20 NOTE — ED PROVIDER NOTES
Patient Seen in: [facility] Emergency Department    History     Chief Complaint   Patient presents with    Nausea/Vomiting/Diarrhea       HPI    History is provided by patient/independent historian: Patient, EMS  79 year old male with history of hypertension, hyperlipidemia, BPH, CAD, brought in by EMS from assisted living after being called for sick person.  Patient states that he was feeling very cold, the room was very hot on EMS arrival, they attempted to take a blood pressure, but patient Reportedly tensing up and was unable to.  Other vital signs were normal.  He is complaining of abdominal pain, nausea vomiting and diarrhea.  He does report difficulty urinating.  No cough, headache, fevers.    History reviewed.   Past Medical History:    Alcohol dependence (HCC)    Anxiety    Back problem    BPH (benign prostatic hyperplasia)    Carotid artery disease (HCC)    Cataract    Compression fracture of L1 lumbar vertebra (HCC)    Coronary artery disease    Depression    Essential hypertension    Hearing impairment    Not using the hearing aids    High blood pressure    High cholesterol    History of chickenpox    History of measles    Hyperlipidemia    Mood disorder (HCC)    Osteoarthritis    Urinary retention    pt reports daily straight cath    Visual impairment    Reading glasses         History reviewed.   Past Surgical History:   Procedure Laterality Date    Cabg  2007    Cabg      14 yrs ago    Cataract Bilateral 2014    Colonoscopy      Cystourethroscopy  08/07/2019    Kyphoplasty, lumbar, 1 level  10/19/2022    Tonsillectomy  1950         Home Medications reviewed :  Prescriptions Prior to Admission[1]      History reviewed.   Social History     Socioeconomic History    Marital status:     Number of children: 1   Occupational History    Occupation: [employer]     Comment: AMA, retired   Tobacco Use    Smoking status: Former     Current packs/day: 0.00     Average packs/day: 1 pack/day for  25.0 years (25.0 ttl pk-yrs)     Types: Cigarettes     Start date:      Quit date:      Years since quittin.0    Smokeless tobacco: Never   Vaping Use    Vaping status: Never Used   Substance and Sexual Activity    Alcohol use: Yes     Comment: Pt had hx drinking heavily. LAst drink Oct 6, 2022    Drug use: No   Other Topics Concern    Caffeine Concern Yes     Comment: Coffee, 2 cups; per NG         ROS  Review of Systems   Constitutional:  Positive for chills.   Respiratory:  Negative for shortness of breath.    Cardiovascular:  Negative for chest pain.   Gastrointestinal:  Positive for abdominal pain, diarrhea, nausea and vomiting.   All other systems reviewed and are negative.     All other pertinent organ systems are reviewed and are negative.      Physical Exam     ED Triage Vitals [25 1833]   /77   Pulse 120   Resp (!) 34   Temp 97.7 °F (36.5 °C)   Temp src Oral   SpO2 95 %   O2 Device None (Room air)     Vital signs reviewed.      Physical Exam  Vitals and nursing note reviewed.   Constitutional:       Comments: Appears uncomfortable   Cardiovascular:      Pulses: Normal pulses.   Pulmonary:      Effort: No respiratory distress.   Abdominal:      General: There is no distension.      Tenderness: There is abdominal tenderness in the suprapubic area.      Comments: Suprapubic catheter in place   Skin:     General: Skin is warm.      Coloration: Skin is mottled.   Neurological:      Mental Status: He is alert.         ED Course       Labs:     Labs Reviewed   CBC WITH DIFFERENTIAL WITH PLATELET - Abnormal; Notable for the following components:       Result Value    RDW-SD 47.5 (*)     Lymphocyte Absolute 0.83 (*)     Monocyte Absolute 0.04 (*)     All other components within normal limits   BASIC METABOLIC PANEL (8) - Abnormal; Notable for the following components:    Glucose 209 (*)     CO2 16.0 (*)     Anion Gap 19 (*)     BUN 91 (*)     Creatinine 3.51 (*)     BUN/CREA Ratio 25.9 (*)      Calculated Osmolality 318 (*)     eGFR-Cr 17 (*)     All other components within normal limits   HEPATIC FUNCTION PANEL (7) - Abnormal; Notable for the following components:    AST 41 (*)     All other components within normal limits   URINALYSIS WITH CULTURE REFLEX - Abnormal; Notable for the following components:    Clarity Urine Ex.Turbid (*)     Ketones Urine Trace (*)     Blood Urine 1+ (*)     Protein Urine 100 (*)     Leukocyte Esterase Urine 500 (*)     WBC Urine >50 (*)     RBC Urine >10 (*)     Bacteria Urine 2+ (*)     Squamous Epi. Cells Few (*)     Triple PO4 Crystals Few (*)     All other components within normal limits   LACTIC ACID, PLASMA - Abnormal; Notable for the following components:    Lactic Acid 6.9 (*)     All other components within normal limits   POCT GLUCOSE - Abnormal; Notable for the following components:    POC Glucose  172 (*)     All other components within normal limits   SARS-COV-2/FLU A AND B/RSV BY PCR (GENEXPERT) - Normal    Narrative:     This test is intended for the qualitative detection and differentiation of SARS-CoV-2, influenza A, influenza B, and respiratory syncytial virus (RSV) viral RNA in nasopharyngeal or nares swabs from individuals suspected of respiratory viral infection consistent with COVID-19 by their healthcare provider. Signs and symptoms of respiratory viral infection due to SARS-CoV-2, influenza, and RSV can be similar.                                    Test performed using the Xpert Xpress SARS-CoV-2/FLU/RSV (real time RT-PCR)  assay on the GeneXpert instrument, [employer], [place] [address].                   This test is being used under the Food and Drug Administration's Emergency Use Authorization.                                    The authorized Fact Sheet for Healthcare Providers for this assay is available upon request from the laboratory.   LACTIC ACID REFLEX POST POSTIVE   RAINBOW DRAW LAVENDER   RAINBOW DRAW LIGHT GREEN   RAINBOW DRAW BLUE    RAINBOW DRAW GOLD   URINE CULTURE, ROUTINE   ED/MRSA SCREEN BY PCR-CC   BLOOD CULTURE   BLOOD CULTURE         My EKG Interpretation:   As reviewed and Interpreted by me      Imaging Results Available and Reviewed while in ED:   CT ABDOMEN+PELVIS(CPT=74176)    Result Date: 1/19/2025  CONCLUSION:  1. Suprapubic catheter and diffusely thickened urinary bladder with perivesical inflammatory changes compatible with cystitis.  Mild-to-moderate left hydronephrosis, and moderate left hydroureter.  Mild -moderate right hydroureter without hydronephrosis.   Findings may be related to a combination of urinary tract infection, and neurogenic bladder. 2. No additional acute finding.     Dictated by (CST): Cameron Smiley MD on 1/19/2025 at 7:55 PM     Finalized by (CST): Cameron Smiley MD on 1/19/2025 at 8:05 PM         My review and independent interpretation of CT images: no free fluid. Radiology report corroborates this in addition to other details as reported by them.      Decision rules/scores evaluated: none      Diagnostic labs/tests considered but not ordered: none    ED Medications Administered:   Medications   sodium chloride 0.9 % IV bolus 2,121 mL (2,121 mL Intravenous New Bag 1/19/25 1928)   cefTRIAXone (Rocephin) 2 g in sodium chloride 0.9% 100 mL IVPB-ADDV (2 g Intravenous New Bag 1/19/25 1946)            - suprapubic catheter draining 2500cc  I performed the sepsis reassessment at 8:15 PM.      MDM       Medical Decision Making      Differential Diagnosis: After obtaining the patient's history, performing the physical exam and reviewing the diagnostics, multiple initial diagnoses were considered based on the presenting problem including appendicitis, nephrolithiasis, UTI, gastroenteritis, influenza    External document review: I personally reviewed available external medical records for any recent pertinent discharge summaries, testing, and procedures - the findings are as follows: 6/28/24 visit with Dr. Mariscal  for chronic urinary retention    Complicating Factors: The patient already  has a past medical history of Alcohol dependence ([facility]), Anxiety, Back problem, BPH (benign prostatic hyperplasia), Carotid artery disease ([facility]), Cataract, Compression fracture of L1 lumbar vertebra ([facility]) (10/17/2022), Coronary artery disease, Depression, Essential hypertension, Hearing impairment, High blood pressure, High cholesterol, History of chickenpox, History of measles, Hyperlipidemia, Mood disorder ([facility]), Osteoarthritis, Urinary retention, and Visual impairment. to contribute to the complexity of this ED evaluation.    Procedures performed: none    Discussed management with physician/appropriate source: Dr. Tk alicia with medical floor    Considered admission/deescalation of care for: none    Social determinants of health affecting patient care: none    Prescription medications considered: discussed continuing current medication regimen    The patient requires continuous monitoring for: nausea/vomiting, diarrhea    Shared decision making: discussed possible admission    Critical Care Time:  Total critical care time for this patient was 40 minutes exclusive of separately billable procedures, but in obtaining history, performing a physical exam, bedside monitoring of interventions, collecting and interpreting test, and discussion with consultants.        Disposition and Plan     Clinical Impression:  1. Urinary retention    2. ALTA (acute kidney injury) ([facility])    3. Urinary tract infection with hematuria, site unspecified        Disposition:  Admit    Follow-up:  No follow-up provider specified.    Medications Prescribed:  Current Discharge Medication List          Hospital Problems       Present on Admission  Date Reviewed: 6/28/2024            ICD-10-CM Noted POA    * (Principal) Urinary retention R33.9 1/19/2025 Unknown                     [1] (Not in a hospital admission)

## 2025-01-20 NOTE — PLAN OF CARE
Problem: PAIN - ADULT  Goal: Verbalizes/displays adequate comfort level or patient's stated pain goal  Description: INTERVENTIONS:  - Encourage pt to monitor pain and request assistance  - Assess pain using appropriate pain scale  - Administer analgesics based on type and severity of pain and evaluate response  - Implement non-pharmacological measures as appropriate and evaluate response  - Consider cultural and social influences on pain and pain management  - Manage/alleviate anxiety  - Utilize distraction and/or relaxation techniques  - Monitor for opioid side effects  - Notify MD/LIP if interventions unsuccessful or patient reports new pain  - Anticipate increased pain with activity and pre-medicate as appropriate  1/20/2025 1712 by Shruthi Courtney RN  Outcome: Progressing  1/20/2025 1428 by Shruthi Courtney RN  Outcome: Progressing     Problem: RISK FOR INFECTION - ADULT  Goal: Absence of fever/infection during anticipated neutropenic period  Description: INTERVENTIONS  - Monitor WBC  - Administer growth factors as ordered  - Implement neutropenic guidelines  1/20/2025 1712 by Shruthi Courtney RN  Outcome: Progressing  1/20/2025 1428 by Shruthi Courtney RN  Outcome: Progressing     Problem: SAFETY ADULT - FALL  Goal: Free from fall injury  Description: INTERVENTIONS:  - Assess pt frequently for physical needs  - Identify cognitive and physical deficits and behaviors that affect risk of falls.  - [place] fall precautions as indicated by assessment.  - Educate pt/family on patient safety including physical limitations  - Instruct pt to call for assistance with activity based on assessment  - Modify environment to reduce risk of injury  - Provide assistive devices as appropriate  - Consider OT/PT consult to assist with strengthening/mobility  - Encourage toileting schedule  1/20/2025 1712 by Shruthi Courtney RN  Outcome: Progressing  1/20/2025 1428 by Shruthi Courtney RN  Outcome: Progressing      Problem: DISCHARGE PLANNING  Goal: Discharge to home or other facility with appropriate resources  Description: INTERVENTIONS:  - Identify barriers to discharge w/pt and caregiver  - Include patient/family/discharge partner in discharge planning  - Arrange for needed discharge resources and transportation as appropriate  - Identify discharge learning needs (meds, wound care, etc)  - Arrange for interpreters to assist at discharge as needed  - Consider post-discharge preferences of patient/family/discharge partner  - Complete POLST form as appropriate  - Assess patient's ability to be responsible for managing their own health  - Refer to Case Management Department for coordinating discharge planning if the patient needs post-hospital services based on physician/LIP order or complex needs related to functional status, cognitive ability or social support system  1/20/2025 1712 by Shruthi Courtney RN  Outcome: Progressing  1/20/2025 1428 by Shruthi Courtney RN  Outcome: Progressing     Problem: Patient Centered Care  Goal: Patient preferences are identified and integrated in the patient's plan of care  Description: Interventions:  - What would you like us to know as we care for you? I live at home alone at [facility]  - Provide timely, complete, and accurate information to patient/family  - Incorporate patient and family knowledge, values, beliefs, and cultural backgrounds into the planning and delivery of care  - Encourage patient/family to participate in care and decision-making at the level they choose  - Honor patient and family perspectives and choices  1/20/2025 1712 by Shruthi Courtney, RN  Outcome: Progressing  1/20/2025 1428 by Shruthi Courtney, RN  Outcome: Progressing

## 2025-01-20 NOTE — H&P
[facility]  part of [facility]                                                                                                          History and Physical     Sumeet Keith Patient Status:  Emergency    1945 MRN [id number]   Location [facility] EMERGENCY DEPARTMENT Attending Jinny Mancini MD   Hosp Day # 0 PCP Arturo Mariscal DO     Patient is somnolent.  History obtained from chart review.    Chief Complaint: Altered mental status, abdominal pain, nausea vomiting    Subjective:    Sumeet Keith is a 79 year old male with history of chronic urinary retention s/p suprapubic catheter, anxiety, CAD, depression, HTN, HLD who was brought into the ED for evaluation of altered mental status, abdominal pain with nausea and vomiting.  Also some shortness of breath.  He had the chills but no fever.  No cough or headache.  Initial vital signs with tachycardia, tachypnea.  Otherwise stable  On evaluation, SP catheter was noted to be capped.  On drainage, 2500 cc of urine was drained.  There was noted improvement in patient's tachypnea and abdominal discomfort.  Per report patient had last drained catheter about 5 AM earlier today.    Labs with creatinine 3.51.  Lactic acid 6.9  WBC 4.7  UA with 500 leukocyte esterase, positive bacteria.  Negative nitrites  CT abdomen and pelvis: 0    And diffusely thickened urinary RRR.  Vesicular inflammatory changes compatible with cystitis.  Mild to moderate left hydronephrosis and moderate left hydroureter.  Mild to moderate right hydroureter without hydronephrosis.    Antibiotics with ceftriaxone initiated as well as IV fluids per sepsis protocol  Patient to be admitted for further treatment.    At the time of my evaluation, he is drowsy.  Will open eyes focal right back to sleep.  Denied any pain.  Follows commands with squeezing fingers.    History/Other:      Past Medical History:  Past Medical History:    Alcohol dependence (HCC)     Anxiety    Back problem    BPH (benign prostatic hyperplasia)    Carotid artery disease (HCC)    Cataract    Compression fracture of L1 lumbar vertebra (HCC)    Coronary artery disease    Depression    Essential hypertension    Hearing impairment    Not using the hearing aids    High blood pressure    High cholesterol    History of chickenpox    History of measles    Hyperlipidemia    Mood disorder (HCC)    Osteoarthritis    Urinary retention    pt reports daily straight cath    Visual impairment    Reading glasses        Past Surgical History:   Past Surgical History:   Procedure Laterality Date    Cabg  2007    Cabg      14 yrs ago    Cataract Bilateral 2014    Colonoscopy      Cystourethroscopy  08/07/2019    Kyphoplasty, lumbar, 1 level  10/19/2022    Tonsillectomy  1950       Social History:  reports that he quit smoking about 32 years ago. His smoking use included cigarettes. He started smoking about 57 years ago. He has a 25 pack-year smoking history. He has never used smokeless tobacco. He reports current alcohol use. He reports that he does not use drugs.    Family History:   Family History   Problem Relation Age of Onset    Lipids Father         Hyperlipidemia; per NG    Heart Disease Father         per NG    Diabetes Father     Diabetes Mother     Diabetes Maternal Grandfather         per NG    Cancer Paternal Grandfather         per NG       Allergies: Allergies[1]    Medications:  Medications Ordered Prior to Encounter[2]    Review of Systems:   Unable to obtain due to mental status  Pertinent positives and negatives noted in the HPI.    Objective:     BP 99/65   Pulse 110   Temp 97.7 °F (36.5 °C) (Oral)   Resp (!) 28   Ht 5' 9\" (1.753 m)   Wt 160 lb (72.6 kg)   SpO2 100%   BMI 23.63 kg/m²   General: No acute distress.    HEENT: Normocephalic.  Atraumatic  Neck: Supple.  Respiratory: Normal effort.  CTAB  Cardiovascular: S1, S2 regular.  Tachycardic.  No murmur.   Abdomen: Soft, not distended.   Mild tenderness suprapubic region.  Suprapubic tube  Neurologic: Somnolent but responsive.  Not answering question but follows commands.  Musculoskeletal: No tenderness or deformity.  Extremities: No edema   Psychiatric: Unable to assess    Results:      Labs:  Labs Last 24 Hours:   BMP     CBC    Other     Na 137 Cl 102 BUN 91 Glu 209   Hb 13.9   PTT - Procal -   K 4.2 CO2 16.0 Cr 3.51   WBC 4.7 >< .0  INR - CRP -   Renal Lytes Endo    Hct 42.2   Trop - D dim -   eGFR - Ca 10.0 POC Gluc  172    LFT   pBNP - Lactic 6.9   eGFR AA - PO4 - A1c -   AST 41 APk 113 Prot 7.5  LDL -     Mg - TSH -   ALT 34 T sajan 0.8 Alb 4.5          COVID-19 Lab Results    COVID-19  Lab Results   Component Value Date    COVID19 Not Detected 01/19/2025    COVID19 Not Detected 12/10/2022    COVID19 Not Detected 12/08/2022       Pro-Calcitonin  No results for input(s): \"PCT\" in the last 168 hours.    Cardiac  No results for input(s): \"TROP\", \"PBNP\" in the last 168 hours.    Creatinine Kinase  No results for input(s): \"CK\" in the last 168 hours.    Inflammatory Markers  No results for input(s): \"CRP\", \"CUONG\", \"LDH\", \"DDIMER\" in the last 168 hours.    Imaging: Imaging data reviewed in Epic.    Assessment & Plan:    Acute urinary retention due to capped catheter  Complicated UTI in setting of chronic indwelling catheter  Probable sepsis tachycardia, tachypnea, elevated lactic acid.  With abdominal pain, may be due to urinary retention as well.  Elevated lactic acid  Hypotension following bladder drainage, likely due to reflex vasodilation.  Sepsis also in differential  Chronic urinary retention s/p chronic SPT  Bilateral hydroureter with left hydronephrosis likely due to urinary retention  Acute kidney injury due to urinary retention  -Admit  -Bolus IV fluids per sepsis protocol given in the ED  -Continue hydration  -IV antibiotics with meropenem.  Vancomycin.  Patient with previous ESBL, MRSA.  Narrow antibiotics based on  cultures.  -Follow-up blood and urine cultures  -Pharmacy consult for antibiotics  -Low threshold for ICU if BP does not improve  -Renal ultrasound to reeval hydronephrosis and ALTA    Altered mental status, likely toxic encephalopathy.  Rule out etiology etiology  -Head CT    Anxiety,  CAD,  Depression,  HTN, hold medications for now due to low BP  HLD   -Home meds as appropriate    Quality:  DVT Prophylaxis: Heparin  CODE status: Full code  KARO: TBD      Plan of care discussed with patient. Acknowledged understanding and agrees to plan. Also discussed with the ED physician.    High MDM  Time spent on this admission - examining patient, obtaining history, reviewing previous medical records, going over test results/imaging and discussing plan of care. More than 50% of the time was spent in counseling and/or coordination of care related to complicated UTI, probable sepsis and urinary retention.   All questions answered.     Ana Frey MD  1/19/2025                   [1]   Allergies  Allergen Reactions    Metoprolol OTHER (SEE COMMENTS)     As stated in Med list in [facility]    Ramipril ANGIOEDEMA     Swelling to upper lip.    Milk-Related Compounds DIARRHEA   [2]   No current facility-administered medications on file prior to encounter.     Current Outpatient Medications on File Prior to Encounter   Medication Sig Dispense Refill    Cyanocobalamin (B-12) 100 MCG Oral Tab Take 1 tablet by mouth daily. 90 tablet 0    sertraline 25 MG Oral Tab Take 3 tablets (75 mg total) by mouth daily. 270 tablet 0    amLODIPine 5 MG Oral Tab Take 1 tablet (5 mg total) by mouth daily. 90 tablet 0    atorvastatin 80 MG Oral Tab Take 1 tablet (80 mg total) by mouth nightly. 90 tablet 0    folic acid 1 MG Oral Tab Take 1 tablet (1 mg total) by mouth daily. 90 tablet 0    ibuprofen 200 MG Oral Tab Take 2 tablets (400 mg total) by mouth every 6 (six) hours as needed for Pain.

## 2025-01-20 NOTE — ED QUICK NOTES
Patient suprapubic catheter connected to drainage bag @ this time using sterile technique - cloudy yellow drainage noted.

## 2025-01-20 NOTE — PLAN OF CARE
Problem: PAIN - ADULT  Goal: Verbalizes/displays adequate comfort level or patient's stated pain goal  Description: INTERVENTIONS:  - Encourage pt to monitor pain and request assistance  - Assess pain using appropriate pain scale  - Administer analgesics based on type and severity of pain and evaluate response  - Implement non-pharmacological measures as appropriate and evaluate response  - Consider cultural and social influences on pain and pain management  - Manage/alleviate anxiety  - Utilize distraction and/or relaxation techniques  - Monitor for opioid side effects  - Notify MD/LIP if interventions unsuccessful or patient reports new pain  - Anticipate increased pain with activity and pre-medicate as appropriate  Outcome: Progressing     Problem: RISK FOR INFECTION - ADULT  Goal: Absence of fever/infection during anticipated neutropenic period  Description: INTERVENTIONS  - Monitor WBC  - Administer growth factors as ordered  - Implement neutropenic guidelines  Outcome: Progressing     Problem: SAFETY ADULT - FALL  Goal: Free from fall injury  Description: INTERVENTIONS:  - Assess pt frequently for physical needs  - Identify cognitive and physical deficits and behaviors that affect risk of falls.  - [place] fall precautions as indicated by assessment.  - Educate pt/family on patient safety including physical limitations  - Instruct pt to call for assistance with activity based on assessment  - Modify environment to reduce risk of injury  - Provide assistive devices as appropriate  - Consider OT/PT consult to assist with strengthening/mobility  - Encourage toileting schedule  Outcome: Progressing     Problem: DISCHARGE PLANNING  Goal: Discharge to home or other facility with appropriate resources  Description: INTERVENTIONS:  - Identify barriers to discharge w/pt and caregiver  - Include patient/family/discharge partner in discharge planning  - Arrange for needed discharge resources and transportation as  appropriate  - Identify discharge learning needs (meds, wound care, etc)  - Arrange for interpreters to assist at discharge as needed  - Consider post-discharge preferences of patient/family/discharge partner  - Complete POLST form as appropriate  - Assess patient's ability to be responsible for managing their own health  - Refer to Case Management Department for coordinating discharge planning if the patient needs post-hospital services based on physician/LIP order or complex needs related to functional status, cognitive ability or social support system  Outcome: Progressing     Problem: Patient Centered Care  Goal: Patient preferences are identified and integrated in the patient's plan of care  Description: Interventions:  - What would you like us to know as we care for you? I live alone at [facility].    - Provide timely, complete, and accurate information to patient/family  - Incorporate patient and family knowledge, values, beliefs, and cultural backgrounds into the planning and delivery of care  - Encourage patient/family to participate in care and decision-making at the level they choose  - Honor patient and family perspectives and choices  Outcome: Progressing

## 2025-01-20 NOTE — ED QUICK NOTES
Assumed care of patient @ this time - patient arrived to ED from [facility] for altered mental status = patient confirms N/V/D + SOB + suprapubic pain - patient has suprapubic catheter in place - confirmed he had trouble urinating - patient A+O x 2 (self and location) @ baseline - now A+O x 2 (self and situation). Patient confused and asking for wine on this RN's arrival. Patient breathing nonlabored on RA + pt hemodynamically stable. Confirms hx of alcohol abuse.

## 2025-01-20 NOTE — ED QUICK NOTES
2500 ml of urine drained from suprapubic catheter. Tachypnea resolved. Patient reports decreased abdominal pain.

## 2025-01-20 NOTE — PLAN OF CARE
Problem: PAIN - ADULT  Goal: Verbalizes/displays adequate comfort level or patient's stated pain goal  Description: INTERVENTIONS:  - Encourage pt to monitor pain and request assistance  - Assess pain using appropriate pain scale  - Administer analgesics based on type and severity of pain and evaluate response  - Implement non-pharmacological measures as appropriate and evaluate response  - Consider cultural and social influences on pain and pain management  - Manage/alleviate anxiety  - Utilize distraction and/or relaxation techniques  - Monitor for opioid side effects  - Notify MD/LIP if interventions unsuccessful or patient reports new pain  - Anticipate increased pain with activity and pre-medicate as appropriate  Outcome: Progressing     Problem: RISK FOR INFECTION - ADULT  Goal: Absence of fever/infection during anticipated neutropenic period  Description: INTERVENTIONS  - Monitor WBC  - Administer growth factors as ordered  - Implement neutropenic guidelines  Outcome: Progressing     Problem: SAFETY ADULT - FALL  Goal: Free from fall injury  Description: INTERVENTIONS:  - Assess pt frequently for physical needs  - Identify cognitive and physical deficits and behaviors that affect risk of falls.  - [place] fall precautions as indicated by assessment.  - Educate pt/family on patient safety including physical limitations  - Instruct pt to call for assistance with activity based on assessment  - Modify environment to reduce risk of injury  - Provide assistive devices as appropriate  - Consider OT/PT consult to assist with strengthening/mobility  - Encourage toileting schedule  Outcome: Progressing

## 2025-01-20 NOTE — ED INITIAL ASSESSMENT (HPI)
Patient arrives via EMS from [place] for ams and NVD. +sob. +abdominal pain. Patient reports difficulty urinating.  Patient a/ox2 (name and place), patient states its 2074.

## 2025-01-20 NOTE — PHYSICAL THERAPY NOTE
PHYSICAL THERAPY EVALUATION - INPATIENT     Room Number: 549/549-A  Evaluation Date: 1/20/2025  Type of Evaluation: Initial   Physician Order: PT Eval and Treat    Presenting Problem: urinary retention  Co-Morbidities : suprapubic catheter, etoh history  Reason for Therapy: Mobility Dysfunction and Discharge Planning    PHYSICAL THERAPY ASSESSMENT   Patient is a 79 year old male admitted 1/19/2025 for urinary retention.  Prior to admission, patient's baseline is independent with RW at [facility].  Patient is currently functioning below baseline with bed mobility, transfers, and gait.  Patient is requiring contact guard assist as a result of the following impairments: decreased functional strength, medical status, and decreased compliance/participation.  Physical Therapy will continue to follow for duration of hospitalization.    Patient will benefit from continued skilled PT Services at discharge to promote prior level of function and safety with additional support and return home with home health PT pending improved participation, pt politely declining ambulation this date.     PLAN DURING HOSPITALIZATION  Nursing Mobility Recommendation : 1 Assist     PT Treatment Plan: Bed mobility;Body mechanics;Endurance;Energy conservation;Family education;Gait training;Range of motion;Stoop training;Stair training;Balance training;Transfer training  Rehab Potential : Fair  Frequency (Obs): 5x/week     PHYSICAL THERAPY MEDICAL/SOCIAL HISTORY   History related to current admission: urinary retention      Problem List  Principal Problem:    Urinary retention  Active Problems:    ALTA (acute kidney injury) (HCC)    Urinary tract infection with hematuria, site unspecified      HOME SITUATION  Type of Home: Independent living facility ([facility] ED per chart - pt unable to report)  Home Layout: One level                                    Prior Level of [place]: independent     SUBJECTIVE  \"I don't really want to walk today.\"      PHYSICAL THERAPY EXAMINATION   OBJECTIVE  Precautions: Bed/chair alarm  Fall Risk: High fall risk    WEIGHT BEARING RESTRICTION       PAIN ASSESSMENT     Location: denies       COGNITION  Oriented to self and place, poor insight into medical status    BALANCE  Static Sitting: Fair +  Dynamic Sitting: Fair  Static Standing: Fair -  Dynamic Standing: Fair -    AM-PAC '6-Clicks' INPATIENT SHORT FORM - BASIC MOBILITY  How much difficulty does the patient currently have...  Patient Difficulty: Turning over in bed (including adjusting bedclothes, sheets and blankets)?: A Little   Patient Difficulty: Sitting down on and standing up from a chair with arms (e.g., wheelchair, bedside commode, etc.): A Little   Patient Difficulty: Moving from lying on back to sitting on the side of the bed?: A Little   How much help from another person does the patient currently need...   Help from Another: Moving to and from a bed to a chair (including a wheelchair)?: A Little   Help from Another: Need to walk in hospital room?: A Little   Help from Another: Climbing 3-5 steps with a railing?: A Lot     AM-PAC Score:  Raw Score: 17   Approx Degree of Impairment: 50.57%   Standardized Score (AM-PAC Scale): 42.13   CMS Modifier (G-Code): CK    FUNCTIONAL ABILITY STATUS  Functional Mobility/Gait Assessment  Gait Assistance: Not tested  Rolling: contact guard assist  Supine to Sit: minimal assist  Sit to Supine: contact guard assist  Sit to Stand: contact guard assist    Exercise/Education Provided:  Bed mobility  Body mechanics  Functional activity tolerated    The patient's Approx Degree of Impairment: 50.57% has been calculated based on documentation in the [facility] '6 clicks' Inpatient Basic Mobility Short Form.  Research supports that patients with this level of impairment may benefit from MENA.  Final disposition will be made by interdisciplinary medical team.    Patient End of Session: Needs met;RN aware of session/findings;All patient  questions and concerns addressed;Alarm set;In bed;Call light within reach    CURRENT GOALS  Goals to be met by: 2/15  Patient Goal Patient's self-stated goal is: unstated    Goal #1 Patient is able to demonstrate supine - sit EOB @ level: supervision     Goal #1   Current Status    Goal #2 Patient is able to demonstrate transfers Sit to/from Stand at assistance level: supervision with walker - rolling     Goal #2  Current Status    Goal #3 Patient is able to ambulate 150 feet with assist device: walker - rolling at assistance level: supervision   Goal #3   Current Status    Goal #4 Patient will negotiate 1 stairs/one curb w/ assistive device and supervision   Goal #4   Current Status    Goal #5 Patient to demonstrate independence with home activity/exercise instructions provided to patient in preparation for discharge.   Goal #5   Current Status    Goal #6    Goal #6  Current Status      Patient Evaluation Complexity Level:  History Moderate - 1 or 2 personal factors and/or co-morbidities   Examination of body systems Moderate - addressing a total of 3 or more elements   Clinical Presentation  Moderate - Evolving   Clinical Decision Making  Moderate Complexity     Therapeutic Activity:  13 minutes

## 2025-01-20 NOTE — PROGRESS NOTES
[facility] Pharmacy Dosing Service      Initial Pharmacokinetic Consult for Vancomycin Dosing     Sumeet Keith is a 79 year old male who is being initiated on vancomycin therapy for UTI.  Pharmacy has been asked to dose vancomycin by SYDNEE Chapman.  The initial treatment and monitoring approach will be non-AUC strategy.        Weight and Temperature:    Wt Readings from Last 1 Encounters:   25 72.6 kg (160 lb)        Temp Readings from Last 1 Encounters:   25 98.5 °F (36.9 °C) (Oral)      Labs:   Recent Labs   Lab 25  1842   CREATSERUM 3.51*      Estimated Creatinine Clearance: 17.3 mL/min (A) (based on SCr of 3.51 mg/dL (H)).     Recent Labs   Lab 25  1848   WBC 4.7          The Pharmacokinetic Target is:    Trough/random 10-15 mg/L    Renal Dosing Considerations:    ALTA/ARF     Assessment/Plan:   Initial/Loading dose: Will receive 1000 mg IV (15 mg/kg, capped at 2250 mg) x 1 initial dose.      Maintenance dose: Pharmacy will dose vancomycin at 1000 mg IV every 48 hours    Monitorin) Plan for vancomycin trough to be obtained in approximately 72 hours    2) Pharmacy will order SCr as clinically indicated to assess renal function.    3) Pharmacy will monitor for toxicity and efficacy, adjust vancomycin dose and/or frequency, and order vancomycin levels as appropriate per the Pharmacy and Therapeutics Committee approved protocol until discontinuation of the medication.       We appreciate the opportunity to assist in the care of this patient.     [place], PharmD  2025  10:41 PM  [place]  Pharmacy Extension: [phone]

## 2025-01-20 NOTE — PROGRESS NOTES
[facility]  part of [facility]    Progress Note    Sumeet Keith Patient Status:  Inpatient    1945 MRN [id number]   Location [facility] 5SW/SE Attending Edilia Broderick MD   Hosp Day # 1 PCP Arturo Mariscal DO     Chief Complaint:     Urinary Retention    Subjective:   Subjective:  '  Patient seen and examined while undergoing Echo  No acute complaints  Slightly hypotensive  afebrile    Objective:   Blood pressure 98/59, pulse 74, temperature 98 °F (36.7 °C), temperature source Oral, resp. rate 17, height 5' 9\" (1.753 m), weight 145 lb 4.8 oz (65.9 kg), SpO2 93%.  Physical Exam    General: Pdoes not appear to be in acute distress at this time  HEENT: EOMI PERRLA, atraumatic normocephalic  Cardiac: S1-S2 appreciated  Lungs: Good air entry bilaterally clear to auscultation  Abdomen: Soft nontender nondistended positive bowel sounds  Ext: Peripheral pulses are positive  Neuro: No focal deficits noted  Psych: - unable to assess  Skin: No rashes noted  MSK: Full range of motion intact      Results:   Lab Results   Component Value Date    WBC 12.7 (H) 2025    HGB 11.4 (L) 2025    HCT 34.1 (L) 2025    .0 2025    CREATSERUM 2.83 (H) 2025    BUN 75 (H) 2025     2025    K 3.6 2025     2025    CO2 16.0 (L) 2025     (H) 2025    CA 8.6 (L) 2025    ALB 3.8 2025    ALKPHO 94 2025    BILT 0.4 2025    TP 6.0 2025    AST 37 (H) 2025    ALT 27 2025    PTT 28.7 2025    INR 1.10 2025    TSH 1.080 2023    GGT 67 2022    PSA 0.23 2022    MG 2.0 2025    PHOS 2.8 10/20/2022    TROPHS 7 2022    B12 952 2023    ETOH 236 (H) 2022       CT BRAIN OR HEAD (CPT=70450)    Result Date: 2025  CONCLUSION:  Mild chronic microvascular ischemic disease without acute intracranial abnormality.  Remote right frontal lobe  infarct.    Dictated by (CST): Mark Giordano MD on 1/20/2025 at 12:19 PM     Finalized by (CST): Mark Giordano MD on 1/20/2025 at 12:22 PM          CT ABDOMEN+PELVIS(CPT=74176)    Result Date: 1/19/2025  CONCLUSION:  1. Suprapubic catheter and diffusely thickened urinary bladder with perivesical inflammatory changes compatible with cystitis.  Mild-to-moderate left hydronephrosis, and moderate left hydroureter.  Mild -moderate right hydroureter without hydronephrosis.   Findings may be related to a combination of urinary tract infection, and neurogenic bladder. 2. No additional acute finding.     Dictated by (CST): Cameron Smiley MD on 1/19/2025 at 7:55 PM     Finalized by (CST): Cameron Smiley MD on 1/19/2025 at 8:05 PM         EKG 12 Lead    Result Date: 1/20/2025  Normal sinus rhythm with sinus arrhythmia Low voltage QRS, consider pulmonary disease, pericardial effusion, or normal variant Nonspecific T wave abnormality Abnormal ECG When compared with ECG of 15-MAR-2023 16:22, Sinus rhythm has replaced Ectopic atrial rhythm Nonspecific T wave abnormality, worse in Anterior-lateral leads     Assessment & Plan:       Acute urinary retention due to capped catheter  Complicated UTI in setting of chronic indwelling catheter  Probable sepsis tachycardia, tachypnea, elevated lactic acid.  With abdominal pain, may be due to urinary retention as well.  Elevated lactic acid  Hypotension following bladder drainage, likely due to reflex vasodilation.  Sepsis also in differential  Chronic urinary retention s/p chronic SPT  Bilateral hydroureter with left hydronephrosis likely due to urinary retention  Acute kidney injury due to urinary retention  -Admit  -Bolus IV fluids per sepsis protocol given in the ED  -Continue hydration  -IV antibiotics with meropenem.  Vancomycin.  Patient with previous ESBL, MRSA.  Narrow antibiotics based on cultures.  -Follow-up blood and urine cultures  -Pharmacy consult for antibiotics  -Low  threshold for ICU if BP does not improve  -Renal ultrasound to reeval hydronephrosis and ALTA     Altered mental status, likely toxic encephalopathy.  Rule out etiology etiology  -Head CT     Anxiety,  CAD,  Depression,  HTN, hold medications for now due to low BP  HLD   -Home meds as appropriate     Quality:  DVT Prophylaxis: Heparin  CODE status: Full code  KARO: TBD    Global A/P  -CT head reviewed no acute intracranial processes, found to have a remote infarct.  -Hypotension, will continue monitor continue broad-spectrum IV antibiotic therapy -will await finalization of cultures  - acute renal failure, it is improving, lactic acid was elevated.  Patient had normal kidneys in 2023.  Will have nephrology placed on consult  -Reviewed previous consultant notes - possbily post renal in the setting of urinary obstruction  -metabolic acidosis.  -Reviewed CBC, BMP, Mag, and Phos  -Reviewed tests ordered  -Repeat labs in am  -MDM: High, severe exacerbation of chronic illness posing a threat to life. IV medications requiring close inpatient monitoring.         **Certification      PHYSICIAN Certification of Need for Inpatient Hospitalization - Initial Certification    Patient will require inpatient services that will reasonably be expected to span two midnight's based on the clinical documentation in H+P.   Based on patients current state of illness, I anticipate that, after discharge, patient will require TBD.      Edilia Broderick MD

## 2025-01-21 LAB
ANION GAP SERPL CALC-SCNC: 13 MMOL/L (ref 0–18)
BASOPHILS # BLD AUTO: 0.03 X10(3) UL (ref 0–0.2)
BASOPHILS NFR BLD AUTO: 0.3 %
BUN BLD-MCNC: 75 MG/DL (ref 9–23)
BUN/CREAT SERPL: 35.4 (ref 10–20)
C DIFF TOX B STL QL: NEGATIVE
CALCIUM BLD-MCNC: 8.3 MG/DL (ref 8.7–10.4)
CHLORIDE SERPL-SCNC: 106 MMOL/L (ref 98–112)
CO2 SERPL-SCNC: 20 MMOL/L (ref 21–32)
CREAT BLD-MCNC: 2.12 MG/DL
DEPRECATED RDW RBC AUTO: 46.8 FL (ref 35.1–46.3)
EGFRCR SERPLBLD CKD-EPI 2021: 31 ML/MIN/1.73M2 (ref 60–?)
EOSINOPHIL # BLD AUTO: 0.12 X10(3) UL (ref 0–0.7)
EOSINOPHIL NFR BLD AUTO: 1 %
ERYTHROCYTE [DISTWIDTH] IN BLOOD BY AUTOMATED COUNT: 14.1 % (ref 11–15)
GLUCOSE BLD-MCNC: 142 MG/DL (ref 70–99)
HCT VFR BLD AUTO: 33.5 %
HGB BLD-MCNC: 11.3 G/DL
IMM GRANULOCYTES # BLD AUTO: 0.09 X10(3) UL (ref 0–1)
IMM GRANULOCYTES NFR BLD: 0.8 %
LYMPHOCYTES # BLD AUTO: 0.56 X10(3) UL (ref 1–4)
LYMPHOCYTES NFR BLD AUTO: 4.8 %
MAGNESIUM SERPL-MCNC: 1.9 MG/DL (ref 1.6–2.6)
MCH RBC QN AUTO: 30.5 PG (ref 26–34)
MCHC RBC AUTO-ENTMCNC: 33.7 G/DL (ref 31–37)
MCV RBC AUTO: 90.3 FL
MONOCYTES # BLD AUTO: 0.78 X10(3) UL (ref 0.1–1)
MONOCYTES NFR BLD AUTO: 6.7 %
NEUTROPHILS # BLD AUTO: 10.1 X10 (3) UL (ref 1.5–7.7)
NEUTROPHILS # BLD AUTO: 10.1 X10(3) UL (ref 1.5–7.7)
NEUTROPHILS NFR BLD AUTO: 86.4 %
OSMOLALITY SERPL CALC.SUM OF ELEC: 313 MOSM/KG (ref 275–295)
PHOSPHATE SERPL-MCNC: 3.9 MG/DL (ref 2.4–5.1)
PLATELET # BLD AUTO: 187 10(3)UL (ref 150–450)
POTASSIUM SERPL-SCNC: 3.1 MMOL/L (ref 3.5–5.1)
POTASSIUM SERPL-SCNC: 3.5 MMOL/L (ref 3.5–5.1)
RBC # BLD AUTO: 3.71 X10(6)UL
SODIUM SERPL-SCNC: 139 MMOL/L (ref 136–145)
WBC # BLD AUTO: 11.7 X10(3) UL (ref 4–11)

## 2025-01-21 PROCEDURE — 99233 SBSQ HOSP IP/OBS HIGH 50: CPT | Performed by: HOSPITALIST

## 2025-01-21 PROCEDURE — 99233 SBSQ HOSP IP/OBS HIGH 50: CPT | Performed by: INTERNAL MEDICINE

## 2025-01-21 RX ORDER — POTASSIUM CHLORIDE 1500 MG/1
40 TABLET, EXTENDED RELEASE ORAL ONCE
Status: COMPLETED | OUTPATIENT
Start: 2025-01-21 | End: 2025-01-21

## 2025-01-21 RX ORDER — TAMSULOSIN HYDROCHLORIDE 0.4 MG/1
0.4 CAPSULE ORAL DAILY
Status: DISCONTINUED | OUTPATIENT
Start: 2025-01-21 | End: 2025-01-25

## 2025-01-21 NOTE — OCCUPATIONAL THERAPY NOTE
OCCUPATIONAL THERAPY EVALUATION - INPATIENT     Room Number: 549/549-A  Evaluation Date: 1/21/2025  Type of Evaluation: Initial       Physician Order: IP Consult to Occupational Therapy  Reason for Therapy: ADL/IADL Dysfunction and Discharge Planning    OCCUPATIONAL THERAPY ASSESSMENT   RN cleared pt for participation in OT session, which was completed in collaboration with PT. Upon arrival, pt was supine in bed and agreeable to activity. No visitors present during session. Pt was left in bed and alarm was activated. Call light and all needs left in reach. Handoff given to RN.    Patient is a 79 year old male admitted 1/19/2025 for urinary retention; suprapubic catheter.  Prior to admission, pt was at independent living.  Patient is currently requiring up to CGA for ADLs overall along with SBA for supine to sit, SBA for sitting at EOB, CGA-SBA for STS, and CGA-SBA for functional transfer at RW level. Pt tolerated about 1 minutes of supported standing.  Pt has the following impairments: confusion, decreased standing tolerance.    ACTIVITY TOLERANCE           BP: 117/70                Education provided  Educated pt about role of OT and hospital therapy process as well as proper safety techniques including proper hand placement, body mechanics, safety techniques . Pt verbalized/demonstrated fair carryover.    Patient will benefit from continued skilled OT Services at discharge to promote prior level of function and safety with additional support and return home with home health OT    PLAN  OT Treatment Plan: Balance activities;Energy conservation/work simplification techniques;Continued evaluation;Compensatory technique education;Fine motor coordination activities;Neuromuscluar reeducation;Equipment eval/education;Patient/Family training;Patient/Family education;Cognitive reorientation;Endurance training;UE strengthening/ROM;Functional transfer training;Visual perceptual training;IADL training;ADL  training      OCCUPATIONAL THERAPY MEDICAL/SOCIAL HISTORY     Problem List  Principal Problem:    Urinary retention  Active Problems:    ALTA (acute kidney injury) (HCC)    Urinary tract infection with hematuria, site unspecified      Past Medical History  Past Medical History:    Alcohol dependence (HCC)    Anxiety    Back problem    BPH (benign prostatic hyperplasia)    Carotid artery disease (HCC)    Cataract    Compression fracture of L1 lumbar vertebra (HCC)    Coronary artery disease    Depression    Essential hypertension    Hearing impairment    Not using the hearing aids    High blood pressure    High cholesterol    History of chickenpox    History of measles    Hyperlipidemia    Mood disorder (HCC)    Osteoarthritis    Urinary retention    pt reports daily straight cath    Visual impairment    Reading glasses       Past Surgical History  Past Surgical History:   Procedure Laterality Date    Cabg      Cabg      14 yrs ago    Cataract Bilateral 2014    Colonoscopy      Cystourethroscopy  2019    Kyphoplasty, lumbar, 1 level  10/19/2022    Tonsillectomy  1950       HOME SITUATION  Type of Home: Independent living facility  Home Layout: One level  Lives With: Alone                              SUBJECTIVE  \"Well I live here now.\"    OCCUPATIONAL THERAPY EXAMINATION      OBJECTIVE  Precautions: Bed/chair alarm  Fall Risk: High fall risk    PAIN ASSESSMENT  Ratin          COGNITION  Orientation Level:  oriented to place, oriented to time, and oriented to person  Memory:  decreased recall of recent events and decreased short term memory  Safety Judgement:  decreased awareness of need for safety  Awareness of Deficits:  decreased awareness of deficits  Problem Solving:  assistance required to generate solutions and assistance required to implement solutions  Alert and oriented X3:       RANGE OF MOTION   Upper extremity ROM is within functional limits     STRENGTH ASSESSMENT  Upper extremity strength  is within functional limits     COORDINATION  Gross Motor: WFL   Fine Motor: WFL     ACTIVITIES OF DAILY LIVING ASSESSMENT  AM-PAC ‘6-Clicks’ Inpatient Daily Activity Short Form  How much help from another person does the patient currently need…  -   Putting on and taking off regular lower body clothing?: A Little  -   Bathing (including washing, rinsing, drying)?: A Little  -   Toileting, which includes using toilet, bedpan or urinal? : A Little  -   Putting on and taking off regular upper body clothing?: A Little  -   Taking care of personal grooming such as brushing teeth?: A Little  -   Eating meals?: None    AM-PAC Score:  Score: 19  Approx Degree of Impairment: 42.8%  Standardized Score (AM-PAC Scale): 40.22  CMS Modifier (G-Code): CK      FUNCTIONAL TRANSFER ASSESSMENT  CGA-SBA    FUNCTIONAL ADL ASSESSMENT  CGA-SBA    The patient's Approx Degree of Impairment: 42.8% has been calculated based on documentation in the [facility] '6 clicks' Inpatient Daily Activity Short Form.  Research supports that patients with this level of impairment may benefit from home health. Final disposition will be made by interdisciplinary medical team.    OT Goals  Patients self stated goal is: to go home     Patient will complete functional transfer with sup  Comment:     Patient will complete toileting with sup  Comment:     Patient will tolerate standing for 3 minutes in prep for adls with sup   Comment:    Patient will complete item retrieval with sup  Comment:          Goals  on:   Frequency: 3-5x/wk    Patient Evaluation Complexity Level:   Occupational Profile/Medical History MODERATE - Expanded review of history including review of medical or therapy record   Specific performance deficits impacting engagement in ADL/IADL MODERATE  3 - 5 performance deficits   Client Assessment/Performance Deficits MODERATE - Comorbidities and min to mod modifications of tasks    Clinical Decision Making MODERATE - Analysis of  occupational profile, detailed assessments, several treatment options    Overall Complexity MODERATE     OT Session Time: 20 minutes  Self-Care Home Management: 10 minutes           Carmen Pham OT  [facility]  Inpatient Rehabilitation  Occupational Therapy  [phone]

## 2025-01-21 NOTE — PROGRESS NOTES
[facility]  part of [facility]    Progress Note    Sumeet Keith Patient Status:  Inpatient    1945 MRN [id number]   Location [facility] 5SW/SE Attending Fransisca Horn MD   Hosp Day # 2 PCP Arturo Mariscal DO       Subjective:   Sumeet Keith is a(n) 79 year old male     ROS:     Constitutional: Better today  ENMT:  Negative for ear drainage, hearing loss and nasal drainage  Eyes:  Negative for eye discharge and vision loss  Cardiovascular:  Negative for chest pain, sob, irregular heartbeat/palpitations  Respiratory:  Negative for cough, dyspnea and wheezing  Gastrointestinal:  Negative for abdominal pain, constipation, decreased appetite, diarrhea and vomiting  Genitourinary good urine output through Chery  Endocrine:  Negative for abnormal sleep patterns, increased activity, polydipsia and polyphagia  Hema/Lymph:  Negative for easy bleeding and easy bruising  Integumentary:  Negative for pruritus and rash  Musculoskeletal:  Negative for bone/joint symptoms  Neurological:  Negative for gait disturbance  Psychiatric:  Negative for inappropriate interaction and psychiatric symptoms      Vitals:    25 1700   BP: 114/76   Pulse: 88   Resp:    Temp:            PHYSICAL EXAM:   Constitutional: appears well hydrated alert and responsive no acute distress noted  Head/Face: normocephalic  Eyes/Vision: normal extraocular motion is intact  Nose/Mouth/Throat:mucous membranes are moist and no oral lesions are noted  Neck/Thyroid: neck is supple without adenopathy  Lymphatic: no abnormal cervical, supraclavicular adenopathy is noted  Respiratory:  lungs are clear to auscultation bilaterally, normal respiratory effort  Cardiovascular: regular rate and rhythm no murmurs, gallups, or rubs  Abdomen: soft, non-tender, non-distended, BS normal  Vascular: well perfused femoral, and pedal pulses normal  Skin/Hair: no unusual rashes present, no abnormal bruising noted  Back/Spine:  no abnormalities noted  Musculoskeletal: full ROM all extremities good strength  no deformities  Extremities: no edema, cyanosis  Neurological:  Grossly normal    Results:     Laboratory Data:  Lab Results   Component Value Date    WBC 11.7 (H) 01/21/2025    HGB 11.3 (L) 01/21/2025    HCT 33.5 (L) 01/21/2025    .0 01/21/2025    CREATSERUM 2.12 (H) 01/21/2025    BUN 75 (H) 01/21/2025     01/21/2025    K 3.5 01/21/2025     01/21/2025    CO2 20.0 (L) 01/21/2025     (H) 01/21/2025    CA 8.3 (L) 01/21/2025    ALB 3.8 01/19/2025    ALKPHO 94 01/19/2025    BILT 0.4 01/19/2025    TP 6.0 01/19/2025    AST 37 (H) 01/19/2025    ALT 27 01/19/2025    PTT 28.7 01/19/2025    INR 1.10 01/19/2025    TSH 1.080 03/20/2023    GGT 67 03/26/2022    PSA 0.23 06/17/2022    MG 1.9 01/21/2025    PHOS 3.9 01/21/2025    B12 952 03/20/2023    ETOH 236 (H) 05/12/2022       Imaging:  [unfilled]   CT BRAIN OR HEAD (CPT=70450)    Result Date: 1/20/2025  CONCLUSION:  Mild chronic microvascular ischemic disease without acute intracranial abnormality.  Remote right frontal lobe infarct.    Dictated by (CST): Mark Giordano MD on 1/20/2025 at 12:19 PM     Finalized by (CST): Mark Giordano MD on 1/20/2025 at 12:22 PM          CT ABDOMEN+PELVIS(CPT=74176)    Result Date: 1/19/2025  CONCLUSION:  1. Suprapubic catheter and diffusely thickened urinary bladder with perivesical inflammatory changes compatible with cystitis.  Mild-to-moderate left hydronephrosis, and moderate left hydroureter.  Mild -moderate right hydroureter without hydronephrosis.   Findings may be related to a combination of urinary tract infection, and neurogenic bladder. 2. No additional acute finding.     Dictated by (CST): Cameron Smiley MD on 1/19/2025 at 7:55 PM     Finalized by (CST): Cameron Smiley MD on 1/19/2025 at 8:05 PM             ASSESSMENT/PLAN:   Assessment       #1 ALTA most likely due to infection has positive blood cultures remains on  antibiotics  Creatinine down to 2.1  #2 continue IV fluids   With bicarb acidosis better  #3 low K replaced  Continue present plan  1/21/2025  Colby Tucker MD

## 2025-01-21 NOTE — PHYSICAL THERAPY NOTE
PHYSICAL THERAPY TREATMENT NOTE - INPATIENT     Room Number: 549/549-A       Presenting Problem: urinary retention  Co-Morbidities : suprapubic catheter, etoh history    Problem List  Principal Problem:    Urinary retention  Active Problems:    ALTA (acute kidney injury) (HCC)    Urinary tract infection with hematuria, site unspecified      PHYSICAL THERAPY ASSESSMENT   Patient demonstrates fair progress this session, goals  remain in progress.      Patient is requiring contact guard assist as a result of the following impairments: decreased functional strength and medical status.     Patient continues to function near baseline with bed mobility, transfers, and gait.  Next session anticipate patient to progress bed mobility, transfers, and gait.  Physical Therapy will continue to follow patient for duration of hospitalization.    Patient continues to benefit from continued skilled PT services: at discharge to promote prior level of function and safety with additional support and return home with home health PT.    PLAN DURING HOSPITALIZATION  Nursing Mobility Recommendation : 1 Assist     PT Treatment Plan: Bed mobility;Body mechanics;Endurance;Energy conservation;Family education;Gait training;Range of motion;Stoop training;Stair training;Balance training;Transfer training  Frequency (Obs): 5x/week     SUBJECTIVE  \"I live here now.\"     OBJECTIVE  Precautions: Bed/chair alarm    WEIGHT BEARING RESTRICTION       PAIN ASSESSMENT      Location: denies       BALANCE  Static Sitting: Fair +  Dynamic Sitting: Fair  Static Standing: Fair -  Dynamic Standing: Fair -    ACTIVITY TOLERANCE           BP: 117/70  BP Location: Right arm  BP Method: Automatic  Patient Position: Lying     O2 WALK       AM-PAC '6-Clicks' INPATIENT SHORT FORM - BASIC MOBILITY  How much difficulty does the patient currently have...  Patient Difficulty: Turning over in bed (including adjusting bedclothes, sheets and blankets)?: A Little   Patient  Difficulty: Sitting down on and standing up from a chair with arms (e.g., wheelchair, bedside commode, etc.): A Little   Patient Difficulty: Moving from lying on back to sitting on the side of the bed?: A Little   How much help from another person does the patient currently need...   Help from Another: Moving to and from a bed to a chair (including a wheelchair)?: A Little   Help from Another: Need to walk in hospital room?: A Little   Help from Another: Climbing 3-5 steps with a railing?: A Lot     AM-PAC Score:  Raw Score: 17   Approx Degree of Impairment: 50.57%   Standardized Score (AM-PAC Scale): 42.13   CMS Modifier (G-Code): CK    FUNCTIONAL ABILITY STATUS  Functional Mobility/Gait Assessment  Gait Assistance: Contact guard assist  Distance (ft): 20', 15' x 2  Assistive Device: Rolling walker  Pattern: Shuffle  Rolling: stand-by assist  Supine to Sit: stand-by assist  Sit to Supine: stand-by assist  Sit to Stand: stand-by assist    The patient's Approx Degree of Impairment: 50.57% has been calculated based on documentation in the [facility] '6 clicks' Inpatient Daily Activity Short Form.  Research supports that patients with this level of impairment may benefit from MENA.  Final disposition will be made by interdisciplinary medical team.    Patient End of Session: In bed;Needs met;Call light within reach;RN aware of session/findings;All patient questions and concerns addressed;Alarm set    CURRENT GOALS   Goals to be met by: 2/15  Patient Goal Patient's self-stated goal is: unstated    Goal #1 Patient is able to demonstrate supine - sit EOB @ level: supervision      Goal #1   Current Status  not met    Goal #2 Patient is able to demonstrate transfers Sit to/from Stand at assistance level: supervision with walker - rolling      Goal #2  Current Status  not met    Goal #3 Patient is able to ambulate 150 feet with assist device: walker - rolling at assistance level: supervision   Goal #3   Current Status  not met     Goal #4 Patient will negotiate 1 stairs/one curb w/ assistive device and supervision   Goal #4   Current Status  not met    Goal #5 Patient to demonstrate independence with home activity/exercise instructions provided to patient in preparation for discharge.   Goal #5   Current Status  not met    Goal #6     Goal #6  Current Status       Gait Trainin minutes  Therapeutic Activity: 8 minutes

## 2025-01-21 NOTE — CONSULTS
[facility]  part of [facility]    Report of Consultation    Sumeet Keith Patient Status:  Inpatient    1945 MRN [id number]   Location [facility] 5SW/SE Attending Edilia Broderick MD   Hosp Day # 2 PCP Arturo Mariscal DO     Date of Admission:  2025  Date of Consult:  2025  Reason for Consultation:   [place]  urinary obstruction    History of Present Illness:   Patient is a 79 year old male who was admitted to the hospital for Urinary retention:  Livs in NH/ assistedliving    pt couldn't urinarte  was very cold shaking.  IN er r kidney 12 cm  6 mm echogenic non obst stone  lower pole 3mm non obst stone  Cyst upperpole..   Left kdiney 12.7 cm  8mm stone   no hydro    Creat  3.1  was 1.25 two years ago  Pt hx bph  cad. Etoh use. Htn   Past Medical History  Past Medical History:    Alcohol dependence (HCC)    Anxiety    Back problem    BPH (benign prostatic hyperplasia)    Carotid artery disease (HCC)    Cataract    Compression fracture of L1 lumbar vertebra (HCC)    Coronary artery disease    Depression    Essential hypertension    Hearing impairment    Not using the hearing aids    High blood pressure    High cholesterol    History of chickenpox    History of measles    Hyperlipidemia    Mood disorder (HCC)    Osteoarthritis    Urinary retention    pt reports daily straight cath    Visual impairment    Reading glasses       Past Surgical History  Past Surgical History:   Procedure Laterality Date    Cabg      Cabg      14 yrs ago    Cataract Bilateral     Colonoscopy      Cystourethroscopy  2019    Kyphoplasty, lumbar, 1 level  10/19/2022    Tonsillectomy  1950       Family History  Family History   Problem Relation Age of Onset    Lipids Father         Hyperlipidemia; per NG    Heart Disease Father         per NG    Diabetes Father     Diabetes Mother     Diabetes Maternal Grandfather         per NG    Cancer Paternal Grandfather         per NG       Social  History  Social History     Socioeconomic History    Marital status:      Spouse name: Not on file    Number of children: 1    Years of education: Not on file    Highest education level: Not on file   Occupational History    Occupation: [employer]     Comment: AMA, retired   Tobacco Use    Smoking status: Former     Current packs/day: 0.00     Average packs/day: 1 pack/day for 25.0 years (25.0 ttl pk-yrs)     Types: Cigarettes     Start date:      Quit date:      Years since quittin.0    Smokeless tobacco: Never   Vaping Use    Vaping status: Never Used   Substance and Sexual Activity    Alcohol use: Yes     Comment: Pt had hx drinking heavily. LAst drink Oct 6, 2022    Drug use: No    Sexual activity: Not on file   Other Topics Concern     Service Not Asked    Blood Transfusions Not Asked    Caffeine Concern Yes     Comment: Coffee, 2 cups; per NG    Occupational Exposure Not Asked    Hobby Hazards Not Asked    Sleep Concern Not Asked    Stress Concern Not Asked    Weight Concern Not Asked    Special Diet Not Asked    Back Care Not Asked    Exercise Not Asked    Bike Helmet Not Asked    Seat Belt Not Asked    Self-Exams Not Asked   Social History Narrative    Not on file     Social Drivers of Health     Financial Resource Strain: Not on file   Food Insecurity: No Food Insecurity (2025)    Food Insecurity     Food Insecurity: Never true   Transportation Needs: Unknown (2025)    Transportation Needs     Lack of Transportation: Patient unable to answer     Car Seat: Not on file   Physical Activity: Not on file   Stress: Not on file   Social Connections: Not on file   Housing Stability: Low Risk  (2025)    Housing Stability     Housing Instability: No     Housing Instability Emergency: Not on file     Crib or Bassinette: Not on file          Current Medications:  Current Facility-Administered Medications   Medication Dose Route Frequency    mupirocin (Bactroban) 2% nasal  ointment 1 Application  1 Application Each Nare BID    LORazepam (Ativan) tab 1 mg  1 mg Oral Q1H PRN    Or    LORazepam (Ativan) tab 2 mg  2 mg Oral Q1H PRN    atorvastatin (Lipitor) tab 80 mg  80 mg Oral Nightly    sertraline (Zoloft) tab 75 mg  75 mg Oral Daily    folic acid (Folvite) tab 1 mg  1 mg Oral Daily    lactated ringers infusion   Intravenous Continuous    heparin (Porcine) 5000 UNIT/ML injection 5,000 Units  5,000 Units Subcutaneous 2 times per day    acetaminophen (Tylenol Extra Strength) tab 500 mg  500 mg Oral Q4H PRN    meropenem (Merrem) 500 mg in sodium chloride 0.9% 100 mL IVPB-MBP  500 mg Intravenous Q12H    cyanocobalamin (Vitamin B12) tab 100 mcg  100 mcg Oral Daily    vancomycin (Vancocin) 1,000 mg in sodium chloride 0.9% 250 mL IVPB-ADDV  15 mg/kg Intravenous Q48H     Medications Prior to Admission   Medication Sig    Melatonin 1 MG Oral Cap Take 2 capsules (2 mg total) by mouth at bedtime.    Cyanocobalamin (B-12) 100 MCG Oral Tab Take 1 tablet by mouth daily.    sertraline 25 MG Oral Tab Take 3 tablets (75 mg total) by mouth daily.    amLODIPine 5 MG Oral Tab Take 1 tablet (5 mg total) by mouth daily.    atorvastatin 80 MG Oral Tab Take 1 tablet (80 mg total) by mouth nightly.    folic acid 1 MG Oral Tab Take 1 tablet (1 mg total) by mouth daily.    ibuprofen 200 MG Oral Tab Take 2 tablets (400 mg total) by mouth every 6 (six) hours as needed for Pain.       Allergies  Allergies[1]      Review of Systems:   Constitutional: feels weak hoping to get to bottom  of this   Eyes: negative for irritation, redness and visual disturbance  Ears, nose, mouth, throat, and face: negative for hearing loss and sore throat  Respiratory: negative for cough, hemoptysis and wheezing  Cardiovascular: negative for chest pain, exertional dyspnea, lower extremity edema and palpitations  Gastrointestinal: negative for abdominal pain, diarrhea and nausea  Genitourinarydifficulty urinating  Hematologic/lymphatic:  negative for bleeding and easy bruising  Musculoskeletal:negative for back pain, bone pain and muscle weakness  Neurological: negative for gait problems, memory problems and seizures  Behavioral/Psych: negative for anxiety and depression  Endocrine: negative for polyuria and weight loss     Physical Exam:   Blood pressure 105/62, pulse 75, temperature 98 °F (36.7 °C), temperature source Oral, resp. rate 20, height 5' 9\" (1.753 m), weight 145 lb 4.8 oz (65.9 kg), SpO2 95%.    Intake/Output Summary (Last 24 hours) at 1/21/2025 0127  Last data filed at 1/20/2025 1800  Gross per 24 hour   Intake 3907 ml   Output 900 ml   Net 3007 ml     Wt Readings from Last 3 Encounters:   01/19/25 145 lb 4.8 oz (65.9 kg)   06/28/24 152 lb (68.9 kg)   05/11/23 144 lb 9.6 oz (65.6 kg)       General appearance: alert,appears dishelved  Head: Normocephalic, atraumatic  Eyes: conjunctivae/corneas clear  Throat: lips, mucosa, and tongue normal; teeth and gums normal  Neck:  no JVD, supple, symmetrical  Pulmonary:  clear to auscultation bilaterally  Cardiovascular: S1, S2 normal, no rub or gallop, regular rate and rhythm  Abdominal: soft, non-tender; non distended, bowel sounds normal  bladder full folely in place.  Extremities: extremities normal, no edema  Pulses: pedal pulses palpable  Skin: No rashes or lesions  Lymph nodes: Cervical, supraclavicular normal.  Neurologic: Grossly normal  Psychiatric: calm    Results:     Laboratory Data:  Lab Results   Component Value Date    WBC 12.7 (H) 01/20/2025    HGB 11.4 (L) 01/20/2025    HCT 34.1 (L) 01/20/2025    .0 01/20/2025    CREATSERUM 2.83 (H) 01/20/2025    BUN 75 (H) 01/20/2025     01/20/2025    K 3.6 01/20/2025     01/20/2025    CO2 16.0 (L) 01/20/2025     (H) 01/20/2025    CA 8.6 (L) 01/20/2025    ALB 3.8 01/19/2025    ALKPHO 94 01/19/2025    BILT 0.4 01/19/2025    TP 6.0 01/19/2025    AST 37 (H) 01/19/2025    ALT 27 01/19/2025    PTT 28.7 01/19/2025    INR 1.10  01/19/2025    TSH 1.080 03/20/2023    GGT 67 03/26/2022    PSA 0.23 06/17/2022    MG 2.0 01/20/2025    PHOS 2.8 10/20/2022    B12 952 03/20/2023    ETOH 236 (H) 05/12/2022       Imaging:  [unfilled]   CT BRAIN OR HEAD (CPT=70450)    Result Date: 1/20/2025  CONCLUSION:  Mild chronic microvascular ischemic disease without acute intracranial abnormality.  Remote right frontal lobe infarct.    Dictated by (CST): Mark Giordano MD on 1/20/2025 at 12:19 PM     Finalized by (CST): Mark Giordano MD on 1/20/2025 at 12:22 PM          CT ABDOMEN+PELVIS(CPT=74176)    Result Date: 1/19/2025  CONCLUSION:  1. Suprapubic catheter and diffusely thickened urinary bladder with perivesical inflammatory changes compatible with cystitis.  Mild-to-moderate left hydronephrosis, and moderate left hydroureter.  Mild -moderate right hydroureter without hydronephrosis.   Findings may be related to a combination of urinary tract infection, and neurogenic bladder. 2. No additional acute finding.     Dictated by (CST): Cameron Smiley MD on 1/19/2025 at 7:55 PM     Finalized by (CST): Cameron Smiley MD on 1/19/2025 at 8:05 PM                Impression:     Patient Active Problem List   Diagnosis    Hypercholesteremia    Atherosclerosis of native coronary artery of native heart without angina pectoris    Essential hypertension    Alcohol use disorder, moderate, dependence (HCC)    Sensorineural hearing loss, bilateral    Benign prostatic hyperplasia with weak urinary stream    Memory loss    Insomnia    Chronic retention of urine    Episodic mood disorder (HCC)    Atonic urinary bladder    History of coronary artery bypass graft x 2    Well adult exam    Protein-calorie malnutrition, unspecified severity (HCC)    Chest pain with moderate risk for cardiac etiology    Lumbar pain    Pre-operative general physical examination    Bilateral impacted cerumen    Vitamin D deficiency    Hyperglycemia    Type 2 diabetes mellitus without complication,  without long-term current use of insulin (HCC)    Screening-pulmonary TB    Subconjunctival hemorrhage of left eye    Gait disturbance    Compression fracture of body of thoracic vertebra (HCC)    Urethral false passage    Compression fracture of L1 lumbar vertebra (HCC)    History of alcohol abuse    Urinary retention    ALTA (acute kidney injury) (HCC)    Urinary tract infection with hematuria, site unspecified        Recommendations:  1   Urinary retention finley placed flomax   Follow labs ivf ordered  Thank you for allowing me to participate in the care of your patient.  2 alta stop home motrin  3 htn conrolled  4 ? Uti  ab ordered apprpriately   disc w dr taylor  Will give fliud w bicarb  spolke w nurse  folow I and o  labs  No renal replacment needed at this time.  Colby Tucker MD  1/21/2025         [1]   Allergies  Allergen Reactions    Metoprolol OTHER (SEE COMMENTS)     As stated in Med list in [facility]    Ramipril ANGIOEDEMA     Swelling to upper lip.    Milk-Related Compounds DIARRHEA

## 2025-01-21 NOTE — PLAN OF CARE
Problem: PAIN - ADULT  Goal: Verbalizes/displays adequate comfort level or patient's stated pain goal  Description: INTERVENTIONS:  - Encourage pt to monitor pain and request assistance  - Assess pain using appropriate pain scale  - Administer analgesics based on type and severity of pain and evaluate response  - Implement non-pharmacological measures as appropriate and evaluate response  - Consider cultural and social influences on pain and pain management  - Manage/alleviate anxiety  - Utilize distraction and/or relaxation techniques  - Monitor for opioid side effects  - Notify MD/LIP if interventions unsuccessful or patient reports new pain  - Anticipate increased pain with activity and pre-medicate as appropriate  1/21/2025 0217 by Kelly Jara RN  Outcome: Progressing  1/21/2025 0216 by Kelly Jara RN  Outcome: Progressing     Problem: RISK FOR INFECTION - ADULT  Goal: Absence of fever/infection during anticipated neutropenic period  Description: INTERVENTIONS  - Monitor WBC  - Administer growth factors as ordered  - Implement neutropenic guidelines  1/21/2025 0217 by Kelly Jara RN  Outcome: Progressing  1/21/2025 0216 by Kelly Jara RN  Outcome: Progressing     Problem: SAFETY ADULT - FALL  Goal: Free from fall injury  Description: INTERVENTIONS:  - Assess pt frequently for physical needs  - Identify cognitive and physical deficits and behaviors that affect risk of falls.  - [place] fall precautions as indicated by assessment.  - Educate pt/family on patient safety including physical limitations  - Instruct pt to call for assistance with activity based on assessment  - Modify environment to reduce risk of injury  - Provide assistive devices as appropriate  - Consider OT/PT consult to assist with strengthening/mobility  - Encourage toileting schedule  1/21/2025 0217 by Kelly Jara RN  Outcome: Progressing  1/21/2025 0216 by Kelly Jara RN  Outcome: Progressing     Problem: DISCHARGE  PLANNING  Goal: Discharge to home or other facility with appropriate resources  Description: INTERVENTIONS:  - Identify barriers to discharge w/pt and caregiver  - Include patient/family/discharge partner in discharge planning  - Arrange for needed discharge resources and transportation as appropriate  - Identify discharge learning needs (meds, wound care, etc)  - Arrange for interpreters to assist at discharge as needed  - Consider post-discharge preferences of patient/family/discharge partner  - Complete POLST form as appropriate  - Assess patient's ability to be responsible for managing their own health  - Refer to Case Management Department for coordinating discharge planning if the patient needs post-hospital services based on physician/LIP order or complex needs related to functional status, cognitive ability or social support system  1/21/2025 0217 by Kelly Jara RN  Outcome: Progressing  1/21/2025 0216 by Kelly Jara, RN  Outcome: Progressing     Problem: Patient Centered Care  Goal: Patient preferences are identified and integrated in the patient's plan of care  Description: Interventions:  - What would you like us to know as we care for you?   - Provide timely, complete, and accurate information to patient/family  - Incorporate patient and family knowledge, values, beliefs, and cultural backgrounds into the planning and delivery of care  - Encourage patient/family to participate in care and decision-making at the level they choose  - Honor patient and family perspectives and choices  1/21/2025 0217 by Kelly Jara, RN  Outcome: Progressing  1/21/2025 0216 by Kelly Jara, RN  Outcome: Progressing

## 2025-01-21 NOTE — PROGRESS NOTES
[facility]  part of [facility]    Progress Note    Sumeet Keith Patient Status:  Inpatient    1945 MRN [id number]   Location [facility] 5SW/SE Attending Edilia Broderick MD   Hosp Day # 2 PCP Arturo Mariscal DO     Chief Complaint:     Urinary Retention    Subjective:   Subjective:  '  Patient seen and examined.  Feeling fine at this time.  No chest pain or sob.  No fevers/chills.  No n/v.    A comprehensive 10 point review of systems was completed.  Pertinent positives and negatives noted in the the HPI.      Objective:   Blood pressure 116/77, pulse 79, temperature 98.8 °F (37.1 °C), temperature source Oral, resp. rate 18, height 5' 9\" (1.753 m), weight 145 lb 4.8 oz (65.9 kg), SpO2 96%.  Physical Exam    General: Pt does not appear to be in acute distress at this time  HEENT: EOMI PERRLA, atraumatic normocephalic  Cardiac: S1-S2 appreciated  Lungs: Good air entry bilaterally clear to auscultation  Abdomen: Soft nontender nondistended positive bowel sounds  Ext: Peripheral pulses are positive  Neuro: No focal deficits noted  Psych: - unable to assess  Skin: No rashes noted  MSK: Full range of motion intact      Results:   Lab Results   Component Value Date    WBC 11.7 (H) 2025    HGB 11.3 (L) 2025    HCT 33.5 (L) 2025    .0 2025    CREATSERUM 2.12 (H) 2025    BUN 75 (H) 2025     2025    K 3.1 (L) 2025     2025    CO2 20.0 (L) 2025     (H) 2025    CA 8.3 (L) 2025    ALB 3.8 2025    ALKPHO 94 2025    BILT 0.4 2025    TP 6.0 2025    AST 37 (H) 2025    ALT 27 2025    PTT 28.7 2025    INR 1.10 2025    TSH 1.080 2023    GGT 67 2022    PSA 0.23 2022    MG 1.9 2025    PHOS 3.9 2025    TROPHS 7 2022    B12 952 2023    ETOH 236 (H) 2022       CT BRAIN OR HEAD (CPT=70450)    Result Date:  1/20/2025  CONCLUSION:  Mild chronic microvascular ischemic disease without acute intracranial abnormality.  Remote right frontal lobe infarct.    Dictated by (CST): Mark Giordano MD on 1/20/2025 at 12:19 PM     Finalized by (CST): Mark Giordano MD on 1/20/2025 at 12:22 PM          CT ABDOMEN+PELVIS(CPT=74176)    Result Date: 1/19/2025  CONCLUSION:  1. Suprapubic catheter and diffusely thickened urinary bladder with perivesical inflammatory changes compatible with cystitis.  Mild-to-moderate left hydronephrosis, and moderate left hydroureter.  Mild -moderate right hydroureter without hydronephrosis.   Findings may be related to a combination of urinary tract infection, and neurogenic bladder. 2. No additional acute finding.     Dictated by (CST): Cameron Smiley MD on 1/19/2025 at 7:55 PM     Finalized by (CST): Cameron Smiley MD on 1/19/2025 at 8:05 PM         EKG 12 Lead    Result Date: 1/20/2025  Normal sinus rhythm with sinus arrhythmia Low voltage QRS, consider pulmonary disease, pericardial effusion, or normal variant Nonspecific T wave abnormality Abnormal ECG When compared with ECG of 15-MAR-2023 16:22, Sinus rhythm has replaced Ectopic atrial rhythm Nonspecific T wave abnormality, worse in Anterior-lateral leads Confirmed by SG CEDENO, WYNN ([id number]) on 1/20/2025 4:32:56 PM     Assessment & Plan:       Acute urinary retention due to capped catheter  Complicated UTI in setting of chronic indwelling catheter  Probable sepsis tachycardia, tachypnea, elevated lactic acid.  With abdominal pain, may be due to urinary retention as well.  Elevated lactic acid  Hypotension following bladder drainage, likely due to reflex vasodilation.  Sepsis also in differential  Chronic urinary retention s/p chronic SPT  Bilateral hydroureter with left hydronephrosis likely due to urinary retention  Acute kidney injury due to urinary retention  -Admit  -Bolus IV fluids per sepsis protocol given in the ED  -Continue hydration  -IV  antibiotics with meropenem.  Vancomycin.  Patient with previous ESBL, MRSA.  Narrow antibiotics based on cultures.  -Follow-up blood and urine cultures  -Pharmacy consult for antibiotics  -Low threshold for ICU if BP does not improve - slowly improving  -Renal ultrasound to reeval hydronephrosis and ALTA     Altered mental status, likely toxic encephalopathy.  Rule out etiology etiology  -Head CT - no acute process - now seems to be at baseline     ALTA  - renal consulted  - cr improving    Anxiety,  CAD,  Depression,  HTN, hold medications for now due to low BP  HLD   -Home meds as appropriate     Quality:  DVT Prophylaxis: Heparin  CODE status: Full code  KARO: TBD    Global A/P    -Reviewed CBC, BMP, Mag, and Phos  -Reviewed tests ordered  -Repeat labs in am  -MDM: High, severe exacerbation of chronic illness posing a threat to life. IV medications requiring close inpatient monitoring.         **Certification      PHYSICIAN Certification of Need for Inpatient Hospitalization - Initial Certification    Patient will require inpatient services that will reasonably be expected to span two midnight's based on the clinical documentation in H+P.

## 2025-01-21 NOTE — PLAN OF CARE
Collected stool for cdiff, no acute changes. [place] monitoring, required ativan once this shift.   Problem: PAIN - ADULT  Goal: Verbalizes/displays adequate comfort level or patient's stated pain goal  Description: INTERVENTIONS:  - Encourage pt to monitor pain and request assistance  - Assess pain using appropriate pain scale  - Administer analgesics based on type and severity of pain and evaluate response  - Implement non-pharmacological measures as appropriate and evaluate response  - Consider cultural and social influences on pain and pain management  - Manage/alleviate anxiety  - Utilize distraction and/or relaxation techniques  - Monitor for opioid side effects  - Notify MD/LIP if interventions unsuccessful or patient reports new pain  - Anticipate increased pain with activity and pre-medicate as appropriate  Outcome: Progressing     Problem: RISK FOR INFECTION - ADULT  Goal: Absence of fever/infection during anticipated neutropenic period  Description: INTERVENTIONS  - Monitor WBC  - Administer growth factors as ordered  - Implement neutropenic guidelines  Outcome: Progressing     Problem: SAFETY ADULT - FALL  Goal: Free from fall injury  Description: INTERVENTIONS:  - Assess pt frequently for physical needs  - Identify cognitive and physical deficits and behaviors that affect risk of falls.  - [place] fall precautions as indicated by assessment.  - Educate pt/family on patient safety including physical limitations  - Instruct pt to call for assistance with activity based on assessment  - Modify environment to reduce risk of injury  - Provide assistive devices as appropriate  - Consider OT/PT consult to assist with strengthening/mobility  - Encourage toileting schedule  Outcome: Progressing     Problem: DISCHARGE PLANNING  Goal: Discharge to home or other facility with appropriate resources  Description: INTERVENTIONS:  - Identify barriers to discharge w/pt and caregiver  - Include patient/family/discharge  partner in discharge planning  - Arrange for needed discharge resources and transportation as appropriate  - Identify discharge learning needs (meds, wound care, etc)  - Arrange for interpreters to assist at discharge as needed  - Consider post-discharge preferences of patient/family/discharge partner  - Complete POLST form as appropriate  - Assess patient's ability to be responsible for managing their own health  - Refer to Case Management Department for coordinating discharge planning if the patient needs post-hospital services based on physician/LIP order or complex needs related to functional status, cognitive ability or social support system  Outcome: Progressing     Problem: Patient Centered Care  Goal: Patient preferences are identified and integrated in the patient's plan of care  Description: Interventions:  - What would you like us to know as we care for you? From home   - Provide timely, complete, and accurate information to patient/family  - Incorporate patient and family knowledge, values, beliefs, and cultural backgrounds into the planning and delivery of care  - Encourage patient/family to participate in care and decision-making at the level they choose  - Honor patient and family perspectives and choices  Outcome: Progressing

## 2025-01-22 LAB
ANION GAP SERPL CALC-SCNC: 10 MMOL/L (ref 0–18)
BACTERIA BLD CULT: POSITIVE
BACTERIA BLD CULT: POSITIVE
BLACTX-M ISLT/SPM QL: NOT DETECTED
BUN BLD-MCNC: 57 MG/DL (ref 9–23)
BUN/CREAT SERPL: 31.1 (ref 10–20)
CALCIUM BLD-MCNC: 8.1 MG/DL (ref 8.7–10.4)
CHLORIDE SERPL-SCNC: 107 MMOL/L (ref 98–112)
CO2 SERPL-SCNC: 23 MMOL/L (ref 21–32)
CREAT BLD-MCNC: 1.83 MG/DL
DEPRECATED RDW RBC AUTO: 47.8 FL (ref 35.1–46.3)
EGFRCR SERPLBLD CKD-EPI 2021: 37 ML/MIN/1.73M2 (ref 60–?)
ERYTHROCYTE [DISTWIDTH] IN BLOOD BY AUTOMATED COUNT: 14.6 % (ref 11–15)
GLUCOSE BLD-MCNC: 163 MG/DL (ref 70–99)
GLUCOSE BLDC GLUCOMTR-MCNC: 218 MG/DL (ref 70–99)
HCT VFR BLD AUTO: 33.9 %
HGB BLD-MCNC: 11.5 G/DL
K OXYTOCA DNA BLD POS QL NAA+NON-PROBE: DETECTED
MAGNESIUM SERPL-MCNC: 1.8 MG/DL (ref 1.6–2.6)
MCH RBC QN AUTO: 30.4 PG (ref 26–34)
MCHC RBC AUTO-ENTMCNC: 33.9 G/DL (ref 31–37)
MCV RBC AUTO: 89.7 FL
OSMOLALITY SERPL CALC.SUM OF ELEC: 309 MOSM/KG (ref 275–295)
PLATELET # BLD AUTO: 211 10(3)UL (ref 150–450)
POTASSIUM SERPL-SCNC: 3.3 MMOL/L (ref 3.5–5.1)
RBC # BLD AUTO: 3.78 X10(6)UL
SODIUM SERPL-SCNC: 140 MMOL/L (ref 136–145)
WBC # BLD AUTO: 11.6 X10(3) UL (ref 4–11)

## 2025-01-22 PROCEDURE — 99233 SBSQ HOSP IP/OBS HIGH 50: CPT | Performed by: HOSPITALIST

## 2025-01-22 PROCEDURE — 99233 SBSQ HOSP IP/OBS HIGH 50: CPT | Performed by: INTERNAL MEDICINE

## 2025-01-22 RX ORDER — SODIUM CHLORIDE 9 MG/ML
INJECTION, SOLUTION INTRAVENOUS CONTINUOUS
Status: DISCONTINUED | OUTPATIENT
Start: 2025-01-22 | End: 2025-01-23

## 2025-01-22 RX ORDER — MAGNESIUM OXIDE 400 MG/1
400 TABLET ORAL ONCE
Status: COMPLETED | OUTPATIENT
Start: 2025-01-22 | End: 2025-01-22

## 2025-01-22 RX ORDER — POTASSIUM CHLORIDE 1500 MG/1
40 TABLET, EXTENDED RELEASE ORAL ONCE
Status: COMPLETED | OUTPATIENT
Start: 2025-01-22 | End: 2025-01-22

## 2025-01-22 RX ORDER — POTASSIUM CHLORIDE 1500 MG/1
20 TABLET, EXTENDED RELEASE ORAL ONCE
Status: COMPLETED | OUTPATIENT
Start: 2025-01-22 | End: 2025-01-22

## 2025-01-22 NOTE — PLAN OF CARE
Problem: PAIN - ADULT  Goal: Verbalizes/displays adequate comfort level or patient's stated pain goal  Description: INTERVENTIONS:  - Encourage pt to monitor pain and request assistance  - Assess pain using appropriate pain scale  - Administer analgesics based on type and severity of pain and evaluate response  - Implement non-pharmacological measures as appropriate and evaluate response  - Consider cultural and social influences on pain and pain management  - Manage/alleviate anxiety  - Utilize distraction and/or relaxation techniques  - Monitor for opioid side effects  - Notify MD/LIP if interventions unsuccessful or patient reports new pain  - Anticipate increased pain with activity and pre-medicate as appropriate  Outcome: Progressing     Problem: RISK FOR INFECTION - ADULT  Goal: Absence of fever/infection during anticipated neutropenic period  Description: INTERVENTIONS  - Monitor WBC  - Administer growth factors as ordered  - Implement neutropenic guidelines  Outcome: Progressing     Problem: SAFETY ADULT - FALL  Goal: Free from fall injury  Description: INTERVENTIONS:  - Assess pt frequently for physical needs  - Identify cognitive and physical deficits and behaviors that affect risk of falls.  - [place] fall precautions as indicated by assessment.  - Educate pt/family on patient safety including physical limitations  - Instruct pt to call for assistance with activity based on assessment  - Modify environment to reduce risk of injury  - Provide assistive devices as appropriate  - Consider OT/PT consult to assist with strengthening/mobility  - Encourage toileting schedule  Outcome: Progressing     Problem: DISCHARGE PLANNING  Goal: Discharge to home or other facility with appropriate resources  Description: INTERVENTIONS:  - Identify barriers to discharge w/pt and caregiver  - Include patient/family/discharge partner in discharge planning  - Arrange for needed discharge resources and transportation as  appropriate  - Identify discharge learning needs (meds, wound care, etc)  - Arrange for interpreters to assist at discharge as needed  - Consider post-discharge preferences of patient/family/discharge partner  - Complete POLST form as appropriate  - Assess patient's ability to be responsible for managing their own health  - Refer to Case Management Department for coordinating discharge planning if the patient needs post-hospital services based on physician/LIP order or complex needs related to functional status, cognitive ability or social support system  Outcome: Progressing     Problem: Patient Centered Care  Goal: Patient preferences are identified and integrated in the patient's plan of care  Description: Interventions:  - What would you like us to know as we care for you? From [place]  - Provide timely, complete, and accurate information to patient/family  - Incorporate patient and family knowledge, values, beliefs, and cultural backgrounds into the planning and delivery of care  - Encourage patient/family to participate in care and decision-making at the level they choose  - Honor patient and family perspectives and choices  Outcome: Progressing

## 2025-01-22 NOTE — PROGRESS NOTES
[facility]  part of [facility]    Progress Note    Sumeet Keith Patient Status:  Inpatient    1945 MRN [id number]   Location [facility] 5SW/SE Attending Nara Sherwood DO   Hosp Day # 3 PCP Arturo Mariscal DO     Chief complaint uti     Subjective:   Sumeet Keith is a(n) 79 year old male Pt doing well today. No c/o's     ROS:   No cp, sob   No c/d   No n/v     Objective:   Blood pressure 110/58, pulse 81, temperature 98.5 °F (36.9 °C), temperature source Axillary, resp. rate 16, height 5' 9\" (1.753 m), weight 148 lb 14.4 oz (67.5 kg), SpO2 97%.      Intake/Output Summary (Last 24 hours) at 2025 1532  Last data filed at 2025 1250  Gross per 24 hour   Intake 1466 ml   Output 1151 ml   Net 315 ml       Patient Weight(s) for the past 336 hrs:   Weight   25 0443 148 lb 14.4 oz (67.5 kg)   25 2339 145 lb 4.8 oz (65.9 kg)   25 1833 160 lb (72.6 kg)           General appearance: alert, appears stated age and cooperative  Pulmonary:  clear to auscultation bilaterally  Cardiovascular: S1, S2 normal, no murmur, click, rub or gallop, regular rate and rhythm  Abdominal: soft, non-tender; bowel sounds normal; no masses,  no organomegaly, spc in place   Extremities: extremities normal, atraumatic, no cyanosis or edema        Medicines:     Current Facility-Administered Medications   Medication Dose Route Frequency    sodium bicarbonate 75 mEq in sodium chloride 0.45% 1,075 mL infusion  75 mEq Intravenous Continuous    tamsulosin (Flomax) cap 0.4 mg  0.4 mg Oral Daily    mupirocin (Bactroban) 2% nasal ointment 1 Application  1 Application Each Nare BID    LORazepam (Ativan) tab 1 mg  1 mg Oral Q1H PRN    Or    LORazepam (Ativan) tab 2 mg  2 mg Oral Q1H PRN    atorvastatin (Lipitor) tab 80 mg  80 mg Oral Nightly    sertraline (Zoloft) tab 75 mg  75 mg Oral Daily    folic acid (Folvite) tab 1 mg  1 mg Oral Daily    heparin (Porcine) 5000 UNIT/ML injection 5,000  Units  5,000 Units Subcutaneous 2 times per day    acetaminophen (Tylenol Extra Strength) tab 500 mg  500 mg Oral Q4H PRN    meropenem (Merrem) 500 mg in sodium chloride 0.9% 100 mL IVPB-MBP  500 mg Intravenous Q12H    cyanocobalamin (Vitamin B12) tab 100 mcg  100 mcg Oral Daily                   Blood Pressure and Cardiac Medications            amLODIPine 5 MG Oral Tab             sodium bicarbonate in 0.45% NS infusion 75 mEq (01/22/25 1408)           Lab Results   Component Value Date    WBC 11.6 (H) 01/22/2025    HGB 11.5 (L) 01/22/2025    HCT 33.9 (L) 01/22/2025    .0 01/22/2025    CREATSERUM 1.83 (H) 01/22/2025    BUN 57 (H) 01/22/2025     01/22/2025    K 3.3 (L) 01/22/2025     01/22/2025    CO2 23.0 01/22/2025     (H) 01/22/2025    CA 8.1 (L) 01/22/2025    ALB 3.8 01/19/2025    ALKPHO 94 01/19/2025    BILT 0.4 01/19/2025    TP 6.0 01/19/2025    AST 37 (H) 01/19/2025    ALT 27 01/19/2025    PTT 28.7 01/19/2025    INR 1.10 01/19/2025    TSH 1.080 03/20/2023    GGT 67 03/26/2022    PSA 0.23 06/17/2022    MG 1.8 01/22/2025    PHOS 3.9 01/21/2025    B12 952 03/20/2023    ETOH 236 (H) 05/12/2022       No results found.        Results:     CBC:    Lab Results   Component Value Date    WBC 11.6 (H) 01/22/2025    WBC 11.7 (H) 01/21/2025    WBC 12.7 (H) 01/20/2025     Lab Results   Component Value Date    HGB 11.5 (L) 01/22/2025    HGB 11.3 (L) 01/21/2025    HGB 11.4 (L) 01/20/2025      Lab Results   Component Value Date    .0 01/22/2025    .0 01/21/2025    .0 01/20/2025       Recent Labs   Lab 01/20/25  0528 01/21/25  0509 01/21/25  1302 01/22/25  0559   * 142*  --  163*   BUN 75* 75*  --  57*   CREATSERUM 2.83* 2.12*  --  1.83*   CA 8.6* 8.3*  --  8.1*    139  --  140   K 3.6 3.1* 3.5 3.3*    106  --  107   CO2 16.0* 20.0*  --  23.0           Assessment and Plan:      Acute urinary retention due to capped catheter - resolved     Complicated UTI in  setting of chronic indwelling catheter  - await sens   - cont meropenem     Probable sepsis tachycardia, tachypnea, elevated lactic acid.  With abdominal pain, may be due to urinary retention as well.  - improved   - bp now normal     Chronic urinary retention s/p chronic SPT    Bilateral hydroureter with left hydronephrosis likely due to urinary retention    Acute kidney injury due to urinary retention  - apprec renal consult - improved with bicarb / ivfs  - check daily bmp   - renal us reviewed - b/l nonobstructing stones      Altered mental status, likely toxic encephalopathy.    - near baseline   -Head CT - no acute process - now seems to be at baseline     ALTA  - renal consulted  - cr improving now 1.8   - strict I/o and daily wts          Anxiety,  CAD,  Depression,  HTN, hold medications for now due to low BP - improving  HLD   -Home meds as appropriate     Quality:  DVT Prophylaxis: Heparin  CODE status: Full code  KARO: TBFRANCOIS Sherwood,          Chart reviewed, including current vitals, notes, labs and imaging  Labs ordered and medications adjusted as outlined above  Coordinate care with care team/consultants  Discussed with patient results of tests, management plan as outlined above, and the need for ongoing hospitalization  D/w RN     [facility]        1/22/2025             Supplementary Documentation:   DVT Mechanical Prophylaxis:     Early ambuation  DVT Pharmacologic Prophylaxis   Medication    heparin (Porcine) 5000 UNIT/ML injection 5,000 Units                Code Status: Full Code  Chery: Suprapubic catheter in place  Chery Duration (in days):   Central line:    KARO: 1/23/2025

## 2025-01-22 NOTE — CM/SW NOTE
01/22/25 1100   CM/SW Referral Data   Referral Source Social Work (self-referral)   Reason for Referral Discharge planning   Informant Patient   Medical Hx   Does patient have an established PCP? Yes   Patient Info   Patient's Current Mental Status at Time of Assessment Alert;Oriented   Patient's Home Environment Independent Living   Number of Levels in Home   ([facility])   Patient lives with Alone   Patient Status Prior to Admission   Independent with ADLs and Mobility Yes   Services in place prior to admission DME/Supplies at home   Type of DME/Supplies Wheeled Walker;Straight Cane   Discharge Needs   Anticipated D/C needs Home health care   Choice of Post-Acute Provider   List of appropriate post-acute services provided to patient/family with quality data No - Declined list   Patient declines recommended services Yes     Sw initiated self referral for discharge planning. SW met with patient who is alert and oriented at time of assessment. Patient just moved to [facility]. Patient wishes to keep address on file for billing purposes.  Patient reports that he is independent with things. Patient has a walker and cane, but does not use it. Patient reports hx of HH, and no hx of [facility]. Patient feels that HH services is not beneficial, and does not wish to have hh services at this time.     Plan: Pending medical clearance, DC to [facility]     SW/CM to remain available for support and/or discharge planning.     Abby Kaminski, MSW, LSW   x [phone]

## 2025-01-22 NOTE — PLAN OF CARE
Pt A&Ox2-3, forgetful. Tele in place. Vital signs stable. CIWA protocol remains, no need for prn ativan during the night. Iv fluids infusing. Chery in place. Bed low and locked, call light within reach  Problem: PAIN - ADULT  Goal: Verbalizes/displays adequate comfort level or patient's stated pain goal  Description: INTERVENTIONS:  - Encourage pt to monitor pain and request assistance  - Assess pain using appropriate pain scale  - Administer analgesics based on type and severity of pain and evaluate response  - Implement non-pharmacological measures as appropriate and evaluate response  - Consider cultural and social influences on pain and pain management  - Manage/alleviate anxiety  - Utilize distraction and/or relaxation techniques  - Monitor for opioid side effects  - Notify MD/LIP if interventions unsuccessful or patient reports new pain  - Anticipate increased pain with activity and pre-medicate as appropriate  Outcome: Progressing     Problem: RISK FOR INFECTION - ADULT  Goal: Absence of fever/infection during anticipated neutropenic period  Description: INTERVENTIONS  - Monitor WBC  - Administer growth factors as ordered  - Implement neutropenic guidelines  Outcome: Progressing     Problem: SAFETY ADULT - FALL  Goal: Free from fall injury  Description: INTERVENTIONS:  - Assess pt frequently for physical needs  - Identify cognitive and physical deficits and behaviors that affect risk of falls.  - [place] fall precautions as indicated by assessment.  - Educate pt/family on patient safety including physical limitations  - Instruct pt to call for assistance with activity based on assessment  - Modify environment to reduce risk of injury  - Provide assistive devices as appropriate  - Consider OT/PT consult to assist with strengthening/mobility  - Encourage toileting schedule  Outcome: Progressing     Problem: DISCHARGE PLANNING  Goal: Discharge to home or other facility with appropriate resources  Description:  INTERVENTIONS:  - Identify barriers to discharge w/pt and caregiver  - Include patient/family/discharge partner in discharge planning  - Arrange for needed discharge resources and transportation as appropriate  - Identify discharge learning needs (meds, wound care, etc)  - Arrange for interpreters to assist at discharge as needed  - Consider post-discharge preferences of patient/family/discharge partner  - Complete POLST form as appropriate  - Assess patient's ability to be responsible for managing their own health  - Refer to Case Management Department for coordinating discharge planning if the patient needs post-hospital services based on physician/LIP order or complex needs related to functional status, cognitive ability or social support system  Outcome: Progressing     Problem: Patient Centered Care  Goal: Patient preferences are identified and integrated in the patient's plan of care  Description: Interventions:  - What would you like us to know as we care for you?   - Provide timely, complete, and accurate information to patient/family  - Incorporate patient and family knowledge, values, beliefs, and cultural backgrounds into the planning and delivery of care  - Encourage patient/family to participate in care and decision-making at the level they choose  - Honor patient and family perspectives and choices  Outcome: Progressing

## 2025-01-23 LAB
ANION GAP SERPL CALC-SCNC: 9 MMOL/L (ref 0–18)
BASOPHILS # BLD AUTO: 0.03 X10(3) UL (ref 0–0.2)
BASOPHILS NFR BLD AUTO: 0.3 %
BUN BLD-MCNC: 37 MG/DL (ref 9–23)
BUN/CREAT SERPL: 23 (ref 10–20)
CALCIUM BLD-MCNC: 8.2 MG/DL (ref 8.7–10.4)
CHLORIDE SERPL-SCNC: 106 MMOL/L (ref 98–112)
CO2 SERPL-SCNC: 25 MMOL/L (ref 21–32)
CREAT BLD-MCNC: 1.61 MG/DL
DEPRECATED RDW RBC AUTO: 48.5 FL (ref 35.1–46.3)
EGFRCR SERPLBLD CKD-EPI 2021: 44 ML/MIN/1.73M2 (ref 60–?)
EOSINOPHIL # BLD AUTO: 0.16 X10(3) UL (ref 0–0.7)
EOSINOPHIL NFR BLD AUTO: 1.5 %
ERYTHROCYTE [DISTWIDTH] IN BLOOD BY AUTOMATED COUNT: 14.6 % (ref 11–15)
GLUCOSE BLD-MCNC: 182 MG/DL (ref 70–99)
HCT VFR BLD AUTO: 36.5 %
HGB BLD-MCNC: 12.5 G/DL
IMM GRANULOCYTES # BLD AUTO: 0.21 X10(3) UL (ref 0–1)
IMM GRANULOCYTES NFR BLD: 2 %
LYMPHOCYTES # BLD AUTO: 1.05 X10(3) UL (ref 1–4)
LYMPHOCYTES NFR BLD AUTO: 10 %
MAGNESIUM SERPL-MCNC: 1.7 MG/DL (ref 1.6–2.6)
MCH RBC QN AUTO: 31.3 PG (ref 26–34)
MCHC RBC AUTO-ENTMCNC: 34.2 G/DL (ref 31–37)
MCV RBC AUTO: 91.3 FL
MONOCYTES # BLD AUTO: 0.6 X10(3) UL (ref 0.1–1)
MONOCYTES NFR BLD AUTO: 5.7 %
NEUTROPHILS # BLD AUTO: 8.49 X10 (3) UL (ref 1.5–7.7)
NEUTROPHILS # BLD AUTO: 8.49 X10(3) UL (ref 1.5–7.7)
NEUTROPHILS NFR BLD AUTO: 80.5 %
OSMOLALITY SERPL CALC.SUM OF ELEC: 303 MOSM/KG (ref 275–295)
PLATELET # BLD AUTO: 184 10(3)UL (ref 150–450)
POTASSIUM SERPL-SCNC: 3.6 MMOL/L (ref 3.5–5.1)
POTASSIUM SERPL-SCNC: 3.6 MMOL/L (ref 3.5–5.1)
RBC # BLD AUTO: 4 X10(6)UL
SODIUM SERPL-SCNC: 140 MMOL/L (ref 136–145)
WBC # BLD AUTO: 10.5 X10(3) UL (ref 4–11)

## 2025-01-23 PROCEDURE — 99232 SBSQ HOSP IP/OBS MODERATE 35: CPT | Performed by: INTERNAL MEDICINE

## 2025-01-23 PROCEDURE — 99233 SBSQ HOSP IP/OBS HIGH 50: CPT | Performed by: HOSPITALIST

## 2025-01-23 RX ORDER — POTASSIUM CHLORIDE 1500 MG/1
20 TABLET, EXTENDED RELEASE ORAL ONCE
Status: COMPLETED | OUTPATIENT
Start: 2025-01-23 | End: 2025-01-23

## 2025-01-23 RX ORDER — MAGNESIUM OXIDE 400 MG/1
400 TABLET ORAL ONCE
Status: COMPLETED | OUTPATIENT
Start: 2025-01-23 | End: 2025-01-23

## 2025-01-23 NOTE — PLAN OF CARE
Pt A&Ox2-3. Tele in place. Vital signs stable at this time. Iv fluids infusing. Iv antibiotics infused. Chery in place. Pt up with 1 assist and rolling walker. Bed low and locked. Call light within reach   Problem: PAIN - ADULT  Goal: Verbalizes/displays adequate comfort level or patient's stated pain goal  Description: INTERVENTIONS:  - Encourage pt to monitor pain and request assistance  - Assess pain using appropriate pain scale  - Administer analgesics based on type and severity of pain and evaluate response  - Implement non-pharmacological measures as appropriate and evaluate response  - Consider cultural and social influences on pain and pain management  - Manage/alleviate anxiety  - Utilize distraction and/or relaxation techniques  - Monitor for opioid side effects  - Notify MD/LIP if interventions unsuccessful or patient reports new pain  - Anticipate increased pain with activity and pre-medicate as appropriate  Outcome: Progressing     Problem: RISK FOR INFECTION - ADULT  Goal: Absence of fever/infection during anticipated neutropenic period  Description: INTERVENTIONS  - Monitor WBC  - Administer growth factors as ordered  - Implement neutropenic guidelines  Outcome: Progressing     Problem: SAFETY ADULT - FALL  Goal: Free from fall injury  Description: INTERVENTIONS:  - Assess pt frequently for physical needs  - Identify cognitive and physical deficits and behaviors that affect risk of falls.  - [place] fall precautions as indicated by assessment.  - Educate pt/family on patient safety including physical limitations  - Instruct pt to call for assistance with activity based on assessment  - Modify environment to reduce risk of injury  - Provide assistive devices as appropriate  - Consider OT/PT consult to assist with strengthening/mobility  - Encourage toileting schedule  Outcome: Progressing     Problem: DISCHARGE PLANNING  Goal: Discharge to home or other facility with appropriate  resources  Description: INTERVENTIONS:  - Identify barriers to discharge w/pt and caregiver  - Include patient/family/discharge partner in discharge planning  - Arrange for needed discharge resources and transportation as appropriate  - Identify discharge learning needs (meds, wound care, etc)  - Arrange for interpreters to assist at discharge as needed  - Consider post-discharge preferences of patient/family/discharge partner  - Complete POLST form as appropriate  - Assess patient's ability to be responsible for managing their own health  - Refer to Case Management Department for coordinating discharge planning if the patient needs post-hospital services based on physician/LIP order or complex needs related to functional status, cognitive ability or social support system  Outcome: Progressing     Problem: Patient Centered Care  Goal: Patient preferences are identified and integrated in the patient's plan of care  Description: Interventions:  - What would you like us to know as we care for you? From [place]  - Provide timely, complete, and accurate information to patient/family  - Incorporate patient and family knowledge, values, beliefs, and cultural backgrounds into the planning and delivery of care  - Encourage patient/family to participate in care and decision-making at the level they choose  - Honor patient and family perspectives and choices  Outcome: Progressing

## 2025-01-23 NOTE — CONSULTS
[facility]  part of [facility] ID CONSULT NOTE    Sumeet Keith Patient Status:  Inpatient    1945 MRN [id number]   Location [facility] 5SW/SE Attending Nara Sherwood DO   Hosp Day # 4 PCP Arturo Mariscal DO       Reason for Consultation:  Bacteremia, abx    ASSESSMENT:    Antibiotics: Cefepime  Ampicillin, meropenem, ceftriaxone, vancomycin    # Acute Klebsiella oxytoca bacteremia 2/2  source   -2.5 L of urine drained in ED  # Chronic SPC  # ALTA  -US bladder with nonobstructing intrarenal calculi  # Acute sepsis  # Depression  # HTN    PLAN:  -  Stop meropenem and ampicillin and start cefepime. Can DC on PO ciprofloxacin to complete course on 25.  -  Follow fever curve, wbc.  -  Reviewed labs, micro, imaging reports, available old records.  -  Case d/w patient, primary, RN.    History of Present Illness:  Sumeet Keith is an 79 year old male with a history of suprapubic catheter, HTN, who presented to [facility] ED on  with abdominal pain and less output from his SPC. On arrival, afebrile, wbc 4.7, Cr 3.51, lactate 6.9, s/p SPC drainage of 2.5L of urine in the ED, CT A/P with moderate hydronephrosis, CTH unremarkable, started on vancomycin and meropenem. Blood cx / with Klebsiella oxytoca. Urine cx with SPC with Enterococcus, Klebisella oxytoca, PSAR. Ampicilin added yesterday. ID consulted.    History:  Past Medical History:    Alcohol dependence (HCC)    Anxiety    Back problem    BPH (benign prostatic hyperplasia)    Carotid artery disease (HCC)    Cataract    Compression fracture of L1 lumbar vertebra (HCC)    Coronary artery disease    Depression    Essential hypertension    Hearing impairment    Not using the hearing aids    High blood pressure    High cholesterol    History of chickenpox    History of measles    Hyperlipidemia    Mood disorder (HCC)    Osteoarthritis    Urinary retention    pt reports daily straight cath    Visual impairment     Reading glasses     Past Surgical History:   Procedure Laterality Date    Cabg  2007    Cabg      14 yrs ago    Cataract Bilateral 2014    Colonoscopy      Cystourethroscopy  08/07/2019    Kyphoplasty, lumbar, 1 level  10/19/2022    Tonsillectomy  1950     Family History   Problem Relation Age of Onset    Lipids Father         Hyperlipidemia; per NG    Heart Disease Father         per NG    Diabetes Father     Diabetes Mother     Diabetes Maternal Grandfather         per NG    Cancer Paternal Grandfather         per NG      reports that he quit smoking about 32 years ago. His smoking use included cigarettes. He started smoking about 57 years ago. He has a 25 pack-year smoking history. He has never used smokeless tobacco. He reports current alcohol use. He reports that he does not use drugs.    Allergies:  Allergies[1]    Medications:    Current Facility-Administered Medications:     ceFEPIme (Maxipime) 1 g in sodium chloride 0.9% 100 mL IVPB-MBP, 1 g, Intravenous, Q12H    sodium chloride 0.9% infusion, , Intravenous, Continuous    tamsulosin (Flomax) cap 0.4 mg, 0.4 mg, Oral, Daily    mupirocin (Bactroban) 2% nasal ointment 1 Application, 1 Application, Each Nare, BID    LORazepam (Ativan) tab 1 mg, 1 mg, Oral, Q1H PRN **OR** LORazepam (Ativan) tab 2 mg, 2 mg, Oral, Q1H PRN    atorvastatin (Lipitor) tab 80 mg, 80 mg, Oral, Nightly    sertraline (Zoloft) tab 75 mg, 75 mg, Oral, Daily    folic acid (Folvite) tab 1 mg, 1 mg, Oral, Daily    heparin (Porcine) 5000 UNIT/ML injection 5,000 Units, 5,000 Units, Subcutaneous, 2 times per day    acetaminophen (Tylenol Extra Strength) tab 500 mg, 500 mg, Oral, Q4H PRN    cyanocobalamin (Vitamin B12) tab 100 mcg, 100 mcg, Oral, Daily    Review of Systems:  CONSTITUTIONAL:  No weight loss, weakness or fatigue.  HEENT:  Eyes:  No visual loss, blurred vision, double vision or yellow sclerae. Ears, Nose, Throat:  No hearing loss, sneezing, congestion, runny nose or sore  throat.  SKIN:  No rash or itching.  CARDIOVASCULAR:  No chest pain, chest pressure or chest discomfort  RESPIRATORY:  No shortness of breath, cough or sputum.  GASTROINTESTINAL:  No anorexia, nausea, vomiting or diarrhea. No abdominal pain or blood.  GENITOURINARY:  No Burning on urination.   NEUROLOGICAL:  No headache, dizziness, syncope, paralysis, ataxia, numbness or tingling in the extremities.  MUSCULOSKELETAL:  No muscle, back pain, joint pain or stiffness.  HEMATOLOGIC:  No anemia, bleeding or bruising.  ALLERGIES:  No history of asthma, hives, eczema or rhinitis.    Physical Exam:  Vital signs: Blood pressure 137/80, pulse 79, temperature 98 °F (36.7 °C), temperature source Oral, resp. rate 18, height 5' 9\" (1.753 m), weight 147 lb 6.4 oz (66.9 kg), SpO2 96%.    General: Awake, in bed  HEENT: Moist mucous membranes.   Neck: No lymphadenopathy.  Supple.  Cardiovascular: RRR  Respiratory: Clear to auscultation bilaterally.  No wheezes. No rhonchi.  Abdomen: Soft, nontender, nondistended.   Musculoskeletal: No edema noted  Integument: No lesions. No erythema.  Lines: PIV+  : SPC draining yellow urine    Laboratory Data:  Recent Labs   Lab 01/23/25  0536   RBC 4.00   HGB 12.5*   HCT 36.5*   MCV 91.3   MCH 31.3   MCHC 34.2   RDW 14.6   NEPRELIM 8.49*   WBC 10.5   .0     Recent Labs   Lab 01/19/25  1842 01/19/25  2212 01/20/25  0528 01/21/25  0509 01/21/25  1302 01/22/25  0559 01/23/25  0536   * 150*   < > 142*  --  163* 182*   BUN 91* 84*   < > 75*  --  57* 37*   CREATSERUM 3.51* 3.11*   < > 2.12*  --  1.83* 1.61*   CA 10.0 8.7   < > 8.3*  --  8.1* 8.2*   ALB 4.5 3.8  --   --   --   --   --     140   < > 139  --  140 140   K 4.2 3.3*   < > 3.1* 3.5 3.3* 3.6  3.6    108   < > 106  --  107 106   CO2 16.0* 16.0*   < > 20.0*  --  23.0 25.0   ALKPHO 113 94  --   --   --   --   --    AST 41* 37*  --   --   --   --   --    ALT 34 27  --   --   --   --   --    BILT 0.8 0.4  --   --   --   --    --    TP 7.5 6.0  --   --   --   --   --     < > = values in this interval not displayed.       Microbiology: Reviewed in EMR    Radiology: Reviewed    Thank you for allowing us to participate in the care of this patient. Please do not hesitate to call if you have any questions.   We will continue to follow with you and will make further recommendations based on his progress.    [facility]  [phone]  1/23/2025           [1]   Allergies  Allergen Reactions    Metoprolol OTHER (SEE COMMENTS)     As stated in Med list in [facility]    Ramipril ANGIOEDEMA     Swelling to upper lip.    Milk-Related Compounds DIARRHEA

## 2025-01-23 NOTE — PROGRESS NOTES
[facility]  part of [facility]    Progress Note    Sumeet Keith Patient Status:  Inpatient    1945 MRN [id number]   Location [facility] 5SW/SE Attending Nara Sherwood DO   Hosp Day # 4 PCP Arturo Mariscal DO     Chief complaint uti     Subjective:   Sumeet Keith is a(n) 79 year old male Pt doing well today. No c/o's     ROS:   No cp, sob   No c/d   No n/v     Objective:   Blood pressure 137/83, pulse 84, temperature 98.2 °F (36.8 °C), temperature source Oral, resp. rate 18, height 5' 9\" (1.753 m), weight 147 lb 6.4 oz (66.9 kg), SpO2 98%.      Intake/Output Summary (Last 24 hours) at 2025 1625  Last data filed at 2025 1002  Gross per 24 hour   Intake 2953.85 ml   Output 1750 ml   Net 1203.85 ml       Patient Weight(s) for the past 336 hrs:   Weight   25 0643 147 lb 6.4 oz (66.9 kg)   25 0443 148 lb 14.4 oz (67.5 kg)   25 2339 145 lb 4.8 oz (65.9 kg)   25 1833 160 lb (72.6 kg)           General appearance: alert, appears stated age and cooperative  Pulmonary:  clear to auscultation bilaterally  Cardiovascular: S1, S2 normal, no murmur, click, rub or gallop, regular rate and rhythm  Abdominal: soft, non-tender; bowel sounds normal; no masses,  no organomegaly, spc in place   Extremities: extremities normal, atraumatic, no cyanosis or edema        Medicines:     Current Facility-Administered Medications   Medication Dose Route Frequency    ceFEPIme (Maxipime) 1 g in sodium chloride 0.9% 100 mL IVPB-MBP  1 g Intravenous Q12H    sodium chloride 0.9% infusion   Intravenous Continuous    tamsulosin (Flomax) cap 0.4 mg  0.4 mg Oral Daily    mupirocin (Bactroban) 2% nasal ointment 1 Application  1 Application Each Nare BID    LORazepam (Ativan) tab 1 mg  1 mg Oral Q1H PRN    Or    LORazepam (Ativan) tab 2 mg  2 mg Oral Q1H PRN    atorvastatin (Lipitor) tab 80 mg  80 mg Oral Nightly    sertraline (Zoloft) tab 75 mg  75 mg Oral Daily    folic acid  (Folvite) tab 1 mg  1 mg Oral Daily    heparin (Porcine) 5000 UNIT/ML injection 5,000 Units  5,000 Units Subcutaneous 2 times per day    acetaminophen (Tylenol Extra Strength) tab 500 mg  500 mg Oral Q4H PRN    cyanocobalamin (Vitamin B12) tab 100 mcg  100 mcg Oral Daily                   Blood Pressure and Cardiac Medications            amLODIPine 5 MG Oral Tab             sodium chloride 75 mL/hr at 01/23/25 1301           Lab Results   Component Value Date    WBC 10.5 01/23/2025    HGB 12.5 (L) 01/23/2025    HCT 36.5 (L) 01/23/2025    .0 01/23/2025    CREATSERUM 1.61 (H) 01/23/2025    BUN 37 (H) 01/23/2025     01/23/2025    K 3.6 01/23/2025    K 3.6 01/23/2025     01/23/2025    CO2 25.0 01/23/2025     (H) 01/23/2025    CA 8.2 (L) 01/23/2025    ALB 3.8 01/19/2025    ALKPHO 94 01/19/2025    BILT 0.4 01/19/2025    TP 6.0 01/19/2025    AST 37 (H) 01/19/2025    ALT 27 01/19/2025    PTT 28.7 01/19/2025    INR 1.10 01/19/2025    TSH 1.080 03/20/2023    GGT 67 03/26/2022    PSA 0.23 06/17/2022    MG 1.7 01/23/2025    PHOS 3.9 01/21/2025    B12 952 03/20/2023    ETOH 236 (H) 05/12/2022       No results found.        Results:     CBC:    Lab Results   Component Value Date    WBC 10.5 01/23/2025    WBC 11.6 (H) 01/22/2025    WBC 11.7 (H) 01/21/2025     Lab Results   Component Value Date    HGB 12.5 (L) 01/23/2025    HGB 11.5 (L) 01/22/2025    HGB 11.3 (L) 01/21/2025      Lab Results   Component Value Date    .0 01/23/2025    .0 01/22/2025    .0 01/21/2025       Recent Labs   Lab 01/21/25  0509 01/21/25  1302 01/22/25  0559 01/23/25  0536   *  --  163* 182*   BUN 75*  --  57* 37*   CREATSERUM 2.12*  --  1.83* 1.61*   CA 8.3*  --  8.1* 8.2*     --  140 140   K 3.1* 3.5 3.3* 3.6  3.6     --  107 106   CO2 20.0*  --  23.0 25.0           Assessment and Plan:      Acute urinary retention due to capped catheter - resolved     Complicated UTI in setting of chronic  indwelling catheter  - apprec id. Stop meropenem. On cipro until 2/2     Probable sepsis tachycardia, tachypnea, elevated lactic acid.  With abdominal pain, may be due to urinary retention as well.  - improved   - bp now normal     Chronic urinary retention s/p chronic SPT    Bilateral hydroureter with left hydronephrosis likely due to urinary retention    Acute kidney injury due to urinary retention  - apprec renal consult - improved with bicarb / ivfs  - check daily bmp   - renal us reviewed - b/l nonobstructing stones      Altered mental status, likely toxic encephalopathy.    - near baseline   -Head CT - no acute process - now seems to be at baseline     ALTA  - renal consulted  - cr improving now 1.6  - strict I/o and daily wts          Anxiety,  CAD,  Depression,  HTN, hold medications for now due to low BP - improving  HLD   -Home meds as appropriate     Quality:  DVT Prophylaxis: Heparin  CODE status: Full code  KARO: TBD       Dispo: Home tomorrow if ok with Renal         Nara Sherwood, DO         Chart reviewed, including current vitals, notes, labs and imaging  Labs ordered and medications adjusted as outlined above  Coordinate care with care team/consultants  Discussed with patient results of tests, management plan as outlined above, and the need for ongoing hospitalization  D/w RN     [facility] high        1/22/2025             Supplementary Documentation:   DVT Mechanical Prophylaxis:     Early ambuation  DVT Pharmacologic Prophylaxis   Medication    heparin (Porcine) 5000 UNIT/ML injection 5,000 Units                Code Status: Full Code  Chery: Suprapubic catheter in place  Chery Duration (in days):   Central line:    KARO: 1/23/2025

## 2025-01-23 NOTE — PLAN OF CARE
Problem: PAIN - ADULT  Goal: Verbalizes/displays adequate comfort level or patient's stated pain goal  Description: INTERVENTIONS:  - Encourage pt to monitor pain and request assistance  - Assess pain using appropriate pain scale  - Administer analgesics based on type and severity of pain and evaluate response  - Implement non-pharmacological measures as appropriate and evaluate response  - Consider cultural and social influences on pain and pain management  - Manage/alleviate anxiety  - Utilize distraction and/or relaxation techniques  - Monitor for opioid side effects  - Notify MD/LIP if interventions unsuccessful or patient reports new pain  - Anticipate increased pain with activity and pre-medicate as appropriate  Outcome: Progressing     Problem: RISK FOR INFECTION - ADULT  Goal: Absence of fever/infection during anticipated neutropenic period  Description: INTERVENTIONS  - Monitor WBC  - Administer growth factors as ordered  - Implement neutropenic guidelines  Outcome: Progressing     Problem: SAFETY ADULT - FALL  Goal: Free from fall injury  Description: INTERVENTIONS:  - Assess pt frequently for physical needs  - Identify cognitive and physical deficits and behaviors that affect risk of falls.  - [place] fall precautions as indicated by assessment.  - Educate pt/family on patient safety including physical limitations  - Instruct pt to call for assistance with activity based on assessment  - Modify environment to reduce risk of injury  - Provide assistive devices as appropriate  - Consider OT/PT consult to assist with strengthening/mobility  - Encourage toileting schedule  Outcome: Progressing     Problem: DISCHARGE PLANNING  Goal: Discharge to home or other facility with appropriate resources  Description: INTERVENTIONS:  - Identify barriers to discharge w/pt and caregiver  - Include patient/family/discharge partner in discharge planning  - Arrange for needed discharge resources and transportation as  appropriate  - Identify discharge learning needs (meds, wound care, etc)  - Arrange for interpreters to assist at discharge as needed  - Consider post-discharge preferences of patient/family/discharge partner  - Complete POLST form as appropriate  - Assess patient's ability to be responsible for managing their own health  - Refer to Case Management Department for coordinating discharge planning if the patient needs post-hospital services based on physician/LIP order or complex needs related to functional status, cognitive ability or social support system  Outcome: Progressing     Problem: Patient Centered Care  Goal: Patient preferences are identified and integrated in the patient's plan of care  Description: Interventions:  - What would you like us to know as we care for you? From [place]  - Provide timely, complete, and accurate information to patient/family  - Incorporate patient and family knowledge, values, beliefs, and cultural backgrounds into the planning and delivery of care  - Encourage patient/family to participate in care and decision-making at the level they choose  - Honor patient and family perspectives and choices  Outcome: Progressing

## 2025-01-23 NOTE — PROGRESS NOTES
[facility]  part of [facility]    Progress Note    Sumeet Keith Patient Status:  Inpatient    1945 MRN [id number]   Location [facility] 5SW/SE Attending Nara Sherwood DO   Hosp Day # 3 PCP Arturo Mariscal DO       Subjective:   Sumeet Keith is a(n) 79 year old male     ROS:     Constitutional feels better  ENMT:  Negative for ear drainage, hearing loss and nasal drainage  Eyes:  Negative for eye discharge and vision loss  Cardiovascular:  Negative for chest pain, sob, irregular heartbeat/palpitations  Respiratory:  Negative for cough, dyspnea and wheezing  Gastrointestinal:  Negative for abdominal pain, constipation, decreased appetite, diarrhea and vomiting  Genitourinary: Chery in place  Endocrine:  Negative for abnormal sleep patterns, increased activity, polydipsia and polyphagia  Hema/Lymph:  Negative for easy bleeding and easy bruising  Integumentary:  Negative for pruritus and rash  Musculoskeletal:  Negative for bone/joint symptoms  Neurological:  Negative for gait disturbance  Psychiatric:  Negative for inappropriate interaction and psychiatric symptoms      Vitals:    25   BP: 134/82   Pulse: 104   Resp: 18   Temp: 99.5 °F (37.5 °C)           PHYSICAL EXAM:   Constitutional: appears well hydrated alert and responsive no acute distress noted  Head/Face: normocephalic  Eyes/Vision: normal extraocular motion is intact  Nose/Mouth/Throat:mucous membranes are moist and no oral lesions are noted  Neck/Thyroid: neck is supple without adenopathy  Lymphatic: no abnormal cervical, supraclavicular adenopathy is noted  Respiratory:  lungs are clear to auscultation bilaterally, normal respiratory effort  Cardiovascular: regular rate and rhythm no murmurs, gallups, or rubs  Abdomen: soft, non-tender, non-distended, BS normal  Vascular: well perfused femoral, and pedal pulses normal  Skin/Hair: no unusual rashes present, no abnormal bruising noted  Back/Spine: no  abnormalities noted  Musculoskeletal: full ROM all extremities good strength  no deformities  Extremities: no edema, cyanosis  Neurological:  Grossly normal    Results:     Laboratory Data:  Lab Results   Component Value Date    WBC 11.6 (H) 01/22/2025    HGB 11.5 (L) 01/22/2025    HCT 33.9 (L) 01/22/2025    .0 01/22/2025    CREATSERUM 1.83 (H) 01/22/2025    BUN 57 (H) 01/22/2025     01/22/2025    K 3.3 (L) 01/22/2025     01/22/2025    CO2 23.0 01/22/2025     (H) 01/22/2025    CA 8.1 (L) 01/22/2025    ALB 3.8 01/19/2025    ALKPHO 94 01/19/2025    BILT 0.4 01/19/2025    TP 6.0 01/19/2025    AST 37 (H) 01/19/2025    ALT 27 01/19/2025    PTT 28.7 01/19/2025    INR 1.10 01/19/2025    TSH 1.080 03/20/2023    GGT 67 03/26/2022    PSA 0.23 06/17/2022    MG 1.8 01/22/2025    PHOS 3.9 01/21/2025    B12 952 03/20/2023    ETOH 236 (H) 05/12/2022       Imaging:  [unfilled]   No results found.      ASSESSMENT/PLAN:   Assessment       #1 ALTA due to BPH and sepsis on Flomax  Creatinine coming down 1.83 today  Continue IV fluids will lower dose though and will also replace potassium     #2 Klebsiella in urine on meropenem adjust antibiotics per attending       Urine shows Klebsiella Pseudomonas and Enterococcus  Not sure if the meropenem will cover the Enterococcus defer to Dr. Sherwood  I actually am going to just add some IV ampicillin    Continue present plan  Urine output great continue present plan  1/22/2025  Colby Tucker MD

## 2025-01-24 LAB
ANION GAP SERPL CALC-SCNC: 10 MMOL/L (ref 0–18)
BASOPHILS # BLD AUTO: 0.04 X10(3) UL (ref 0–0.2)
BASOPHILS NFR BLD AUTO: 0.3 %
BUN BLD-MCNC: 26 MG/DL (ref 9–23)
BUN/CREAT SERPL: 18.1 (ref 10–20)
CALCIUM BLD-MCNC: 8.1 MG/DL (ref 8.7–10.4)
CHLORIDE SERPL-SCNC: 106 MMOL/L (ref 98–112)
CO2 SERPL-SCNC: 27 MMOL/L (ref 21–32)
CREAT BLD-MCNC: 1.44 MG/DL
DEPRECATED RDW RBC AUTO: 47.4 FL (ref 35.1–46.3)
EGFRCR SERPLBLD CKD-EPI 2021: 50 ML/MIN/1.73M2 (ref 60–?)
EOSINOPHIL # BLD AUTO: 0.21 X10(3) UL (ref 0–0.7)
EOSINOPHIL NFR BLD AUTO: 1.8 %
ERYTHROCYTE [DISTWIDTH] IN BLOOD BY AUTOMATED COUNT: 14.2 % (ref 11–15)
GLUCOSE BLD-MCNC: 145 MG/DL (ref 70–99)
HCT VFR BLD AUTO: 34.8 %
HGB BLD-MCNC: 12.1 G/DL
IMM GRANULOCYTES # BLD AUTO: 0.34 X10(3) UL (ref 0–1)
IMM GRANULOCYTES NFR BLD: 2.8 %
LYMPHOCYTES # BLD AUTO: 1.14 X10(3) UL (ref 1–4)
LYMPHOCYTES NFR BLD AUTO: 9.6 %
MAGNESIUM SERPL-MCNC: 1.6 MG/DL (ref 1.6–2.6)
MCH RBC QN AUTO: 31.7 PG (ref 26–34)
MCHC RBC AUTO-ENTMCNC: 34.8 G/DL (ref 31–37)
MCV RBC AUTO: 91.1 FL
MONOCYTES # BLD AUTO: 0.66 X10(3) UL (ref 0.1–1)
MONOCYTES NFR BLD AUTO: 5.5 %
NEUTROPHILS # BLD AUTO: 9.54 X10 (3) UL (ref 1.5–7.7)
NEUTROPHILS # BLD AUTO: 9.54 X10(3) UL (ref 1.5–7.7)
NEUTROPHILS NFR BLD AUTO: 80 %
OSMOLALITY SERPL CALC.SUM OF ELEC: 303 MOSM/KG (ref 275–295)
PLATELET # BLD AUTO: 183 10(3)UL (ref 150–450)
POTASSIUM SERPL-SCNC: 3.7 MMOL/L (ref 3.5–5.1)
RBC # BLD AUTO: 3.82 X10(6)UL
SODIUM SERPL-SCNC: 143 MMOL/L (ref 136–145)
WBC # BLD AUTO: 11.9 X10(3) UL (ref 4–11)

## 2025-01-24 PROCEDURE — 99233 SBSQ HOSP IP/OBS HIGH 50: CPT | Performed by: HOSPITALIST

## 2025-01-24 PROCEDURE — 99232 SBSQ HOSP IP/OBS MODERATE 35: CPT | Performed by: INTERNAL MEDICINE

## 2025-01-24 RX ORDER — MAGNESIUM OXIDE 400 MG/1
400 TABLET ORAL ONCE
Status: COMPLETED | OUTPATIENT
Start: 2025-01-24 | End: 2025-01-24

## 2025-01-24 RX ORDER — CIPROFLOXACIN 500 MG/1
500 TABLET, FILM COATED ORAL 2 TIMES DAILY
Qty: 18 TABLET | Refills: 0 | Status: SHIPPED | OUTPATIENT
Start: 2025-01-24 | End: 2025-02-02

## 2025-01-24 NOTE — PLAN OF CARE
Problem: PAIN - ADULT  Goal: Verbalizes/displays adequate comfort level or patient's stated pain goal  Description: INTERVENTIONS:  - Encourage pt to monitor pain and request assistance  - Assess pain using appropriate pain scale  - Administer analgesics based on type and severity of pain and evaluate response  - Implement non-pharmacological measures as appropriate and evaluate response  - Consider cultural and social influences on pain and pain management  - Manage/alleviate anxiety  - Utilize distraction and/or relaxation techniques  - Monitor for opioid side effects  - Notify MD/LIP if interventions unsuccessful or patient reports new pain  - Anticipate increased pain with activity and pre-medicate as appropriate  Outcome: Progressing     Problem: RISK FOR INFECTION - ADULT  Goal: Absence of fever/infection during anticipated neutropenic period  Description: INTERVENTIONS  - Monitor WBC  - Administer growth factors as ordered  - Implement neutropenic guidelines  Outcome: Progressing     Problem: SAFETY ADULT - FALL  Goal: Free from fall injury  Description: INTERVENTIONS:  - Assess pt frequently for physical needs  - Identify cognitive and physical deficits and behaviors that affect risk of falls.  - [place] fall precautions as indicated by assessment.  - Educate pt/family on patient safety including physical limitations  - Instruct pt to call for assistance with activity based on assessment  - Modify environment to reduce risk of injury  - Provide assistive devices as appropriate  - Consider OT/PT consult to assist with strengthening/mobility  - Encourage toileting schedule  Outcome: Progressing     Problem: DISCHARGE PLANNING  Goal: Discharge to home or other facility with appropriate resources  Description: INTERVENTIONS:  - Identify barriers to discharge w/pt and caregiver  - Include patient/family/discharge partner in discharge planning  - Arrange for needed discharge resources and transportation as  appropriate  - Identify discharge learning needs (meds, wound care, etc)  - Arrange for interpreters to assist at discharge as needed  - Consider post-discharge preferences of patient/family/discharge partner  - Complete POLST form as appropriate  - Assess patient's ability to be responsible for managing their own health  - Refer to Case Management Department for coordinating discharge planning if the patient needs post-hospital services based on physician/LIP order or complex needs related to functional status, cognitive ability or social support system  Outcome: Progressing     Problem: Patient Centered Care  Goal: Patient preferences are identified and integrated in the patient's plan of care  Description: Interventions:  - What would you like us to know as we care for you? From [place]  - Provide timely, complete, and accurate information to patient/family  - Incorporate patient and family knowledge, values, beliefs, and cultural backgrounds into the planning and delivery of care  - Encourage patient/family to participate in care and decision-making at the level they choose  - Honor patient and family perspectives and choices  Outcome: Progressing

## 2025-01-24 NOTE — PROGRESS NOTES
[facility]  part of [facility]    Progress Note    Sumeet Keith Patient Status:  Inpatient    1945 MRN [id number]   Location [facility] 5SW/SE Attending Nara Sherwood DO   Hosp Day # 5 PCP Arturo Mariscal DO       Subjective:   Sumeet Keith is a(n) 79 year old male     ROS:     Constitutional:  Negative for decreased activity, fever, irritability and lethargy  ENMT:  Negative for ear drainage, hearing loss and nasal drainage  Eyes:  Negative for eye discharge and vision loss  Cardiovascular:  Negative for chest pain, sob, irregular heartbeat/palpitations  Respiratory:  Negative for cough, dyspnea and wheezing  Gastrointestinal:  Negative for abdominal pain, constipation, decreased appetite, diarrhea and vomiting  Genitourinary: Knitting well through suprapubic catheter feels well  Endocrine:  Negative for abnormal sleep patterns, increased activity, polydipsia and polyphagia  Hema/Lymph:  Negative for easy bleeding and easy bruising  Integumentary:  Negative for pruritus and rash  Musculoskeletal:  Negative for bone/joint symptoms  Neurological:  Negative for gait disturbance  Psychiatric:  Negative for inappropriate interaction and psychiatric symptoms      Vitals:    25 1344   BP: 135/80   Pulse: 91   Resp: 18   Temp: 98.3 °F (36.8 °C)           PHYSICAL EXAM:   Constitutional: appears well hydrated alert and responsive no acute distress noted  Head/Face: normocephalic  Eyes/Vision: normal extraocular motion is intact  Nose/Mouth/Throat:mucous membranes are moist and no oral lesions are noted  Neck/Thyroid: neck is supple without adenopathy  Lymphatic: no abnormal cervical, supraclavicular adenopathy is noted  Respiratory:  lungs are clear to auscultation bilaterally, normal respiratory effort  Cardiovascular: regular rate and rhythm no murmurs, gallups, or rubs  Abdomen: soft, non-tender, non-distended, BS normal  Vascular: well perfused femoral, and pedal  pulses normal  Skin/Hair: no unusual rashes present, no abnormal bruising noted  Back/Spine: no abnormalities noted  Musculoskeletal: full ROM all extremities good strength  no deformities  Extremities: no edema, cyanosis  Neurological:  Grossly normal    Results:     Laboratory Data:  Lab Results   Component Value Date    WBC 11.9 (H) 01/24/2025    HGB 12.1 (L) 01/24/2025    HCT 34.8 (L) 01/24/2025    .0 01/24/2025    CREATSERUM 1.44 (H) 01/24/2025    BUN 26 (H) 01/24/2025     01/24/2025    K 3.7 01/24/2025     01/24/2025    CO2 27.0 01/24/2025     (H) 01/24/2025    CA 8.1 (L) 01/24/2025    ALB 3.8 01/19/2025    ALKPHO 94 01/19/2025    BILT 0.4 01/19/2025    TP 6.0 01/19/2025    AST 37 (H) 01/19/2025    ALT 27 01/19/2025    PTT 28.7 01/19/2025    INR 1.10 01/19/2025    TSH 1.080 03/20/2023    GGT 67 03/26/2022    PSA 0.23 06/17/2022    MG 1.6 01/24/2025    PHOS 3.9 01/21/2025    B12 952 03/20/2023    ETOH 236 (H) 05/12/2022       Imaging:  @[email or web address]@   No results found.      ASSESSMENT/PLAN:   Assessment       #1 UTI  And ALTA due to sepsis now on Flomax IV fluids off potassium has corrected  Has Klebsiella Pseudomonas and Enterococcus to go home with Cipro    Home in the next day or 2 discussed with Dr. Sherwood continue suprapubic catheter  Creatinine 1.44 today potassium 3.7   No need for renal follow-up but follow-up with urology  If needed    1/24/2025  Colby Tucker MD

## 2025-01-24 NOTE — PROGRESS NOTES
INFECTIOUS DISEASE PROGRESS NOTE  [facility]  part of [facility] ID PROGRESS NOTE    Sumeet Keith Patient Status:  Inpatient    1945 MRN [id number]   Location [facility] 5SW/SE Attending Nara Sherwood DO   Hosp Day # 5 PCP Arturo Mariscal DO     Subjective:  ROS reviewed. Complains of headache. States he had chest pain earlier. Afebrile. Still having diarrhea.    ASSESSMENT:    Antibiotics: Cefepime  Ampicillin, meropenem, ceftriaxone, vancomycin     # Acute Klebsiella oxytoca bacteremia 2/  source               -2.5 L of urine drained in ED  # Chronic SPC  # ALTA  -US bladder with nonobstructing intrarenal calculi  # Acute sepsis  # Depression  # HTN     PLAN:  -  Currently on cefepime. Will DC on PO ciprofloxacin to complete course on 25.  -  Follow fever curve, wbc.  -  Reviewed labs, micro, imaging reports, available old records.  -  Case d/w patient, primary, RN.     History of Present Illness:  Sumeet Keith is an 79 year old male with a history of suprapubic catheter, HTN, who presented to [facility] ED on  with abdominal pain and less output from his SPC. On arrival, afebrile, wbc 4.7, Cr 3.51, lactate 6.9, s/p SPC drainage of 2.5L of urine in the ED, CT A/P with moderate hydronephrosis, CTH unremarkable, started on vancomycin and meropenem. Blood cx / with Klebsiella oxytoca. Urine cx with SPC with Enterococcus, Klebisella oxytoca, PSAR. Ampicilin added yesterday. ID consulted.    Physical Exam:  /69 (BP Location: Right arm)   Pulse 91   Temp 98.5 °F (36.9 °C) (Oral)   Resp 18   Ht 5' 9\" (1.753 m)   Wt 153 lb (69.4 kg)   SpO2 98%   BMI 22.59 kg/m²     Gen:   Awake, in bed  HEENT:  EOMI, neck supple  CV/lungs:  RRR, CTAB  Abdom:  Soft, NT/ND, +BS  Skin/extrem:  No rashes, no c/c/e  :   SPC draining yellow urine  Lines:  PIV+    Laboratory Data: Reviewed    Microbiology: Reviewed    Radiology: Reviewed      Raven Umana PA-C    [facility]  [phone]  1/24/2025

## 2025-01-24 NOTE — PLAN OF CARE
Problem: PAIN - ADULT  Goal: Verbalizes/displays adequate comfort level or patient's stated pain goal  Description: INTERVENTIONS:  - Encourage pt to monitor pain and request assistance  - Assess pain using appropriate pain scale  - Administer analgesics based on type and severity of pain and evaluate response  - Implement non-pharmacological measures as appropriate and evaluate response  - Consider cultural and social influences on pain and pain management  - Manage/alleviate anxiety  - Utilize distraction and/or relaxation techniques  - Monitor for opioid side effects  - Notify MD/LIP if interventions unsuccessful or patient reports new pain  - Anticipate increased pain with activity and pre-medicate as appropriate  Outcome: Progressing     Problem: RISK FOR INFECTION - ADULT  Goal: Absence of fever/infection during anticipated neutropenic period  Description: INTERVENTIONS  - Monitor WBC  - Administer growth factors as ordered  - Implement neutropenic guidelines  Outcome: Progressing     Problem: SAFETY ADULT - FALL  Goal: Free from fall injury  Description: INTERVENTIONS:  - Assess pt frequently for physical needs  - Identify cognitive and physical deficits and behaviors that affect risk of falls.  - [place] fall precautions as indicated by assessment.  - Educate pt/family on patient safety including physical limitations  - Instruct pt to call for assistance with activity based on assessment  - Modify environment to reduce risk of injury  - Provide assistive devices as appropriate  - Consider OT/PT consult to assist with strengthening/mobility  - Encourage toileting schedule  Outcome: Progressing     Problem: DISCHARGE PLANNING  Goal: Discharge to home or other facility with appropriate resources  Description: INTERVENTIONS:  - Identify barriers to discharge w/pt and caregiver  - Include patient/family/discharge partner in discharge planning  - Arrange for needed discharge resources and transportation as  appropriate  - Identify discharge learning needs (meds, wound care, etc)  - Arrange for interpreters to assist at discharge as needed  - Consider post-discharge preferences of patient/family/discharge partner  - Complete POLST form as appropriate  - Assess patient's ability to be responsible for managing their own health  - Refer to Case Management Department for coordinating discharge planning if the patient needs post-hospital services based on physician/LIP order or complex needs related to functional status, cognitive ability or social support system  Outcome: Progressing     Problem: Patient Centered Care  Goal: Patient preferences are identified and integrated in the patient's plan of care  Description: Interventions:  - What would you like us to know as we care for you? From [place]  - Provide timely, complete, and accurate information to patient/family  - Incorporate patient and family knowledge, values, beliefs, and cultural backgrounds into the planning and delivery of care  - Encourage patient/family to participate in care and decision-making at the level they choose  - Honor patient and family perspectives and choices  Outcome: Progressing

## 2025-01-24 NOTE — PROGRESS NOTES
[facility]  part of [facility]    Progress Note    Sumeet Keith Patient Status:  Inpatient    1945 MRN [id number]   Location [facility] 5SW/SE Attending Nara Sherwood DO   Hosp Day # 4 PCP Arturo Mariscal DO       Subjective:   Sumeet Keith is a(n) 79 year old male     ROS:     Constitutional feels good  ENMT:  Negative for ear drainage, hearing loss and nasal drainage  Eyes:  Negative for eye discharge and vision loss  Cardiovascular:  Negative for chest pain, sob, irregular heartbeat/palpitations  Respiratory:  Negative for cough, dyspnea and wheezing  Gastrointestinal:  Negative for abdominal pain, constipation, decreased appetite, diarrhea and vomiting  Genitourinary: Chery in place  Endocrine:  Negative for abnormal sleep patterns, increased activity, polydipsia and polyphagia  Hema/Lymph:  Negative for easy bleeding and easy bruising  Integumentary:  Negative for pruritus and rash  Musculoskeletal:  Negative for bone/joint symptoms  Neurological:  Negative for gait disturbance  Psychiatric:  Negative for inappropriate interaction and psychiatric symptoms      Vitals:    25 1620   BP: 137/83   Pulse: 84   Resp: 18   Temp: 98.2 °F (36.8 °C)           PHYSICAL EXAM:   Constitutional: appears well hydrated alert and responsive no acute distress noted looks good  Head/Face: normocephalic  Eyes/Vision: normal extraocular motion is intact  Nose/Mouth/Throat:mucous membranes are moist and no oral lesions are noted  Neck/Thyroid: neck is supple without adenopathy  Lymphatic: no abnormal cervical, supraclavicular adenopathy is noted  Respiratory:  lungs are clear to auscultation bilaterally, normal respiratory effort  Cardiovascular: regular rate and rhythm no murmurs, gallups, or rubs  Abdomen: soft, non-tender, non-distended, BS normal  Vascular: well perfused femoral, and pedal pulses normal  Skin/Hair: no unusual rashes present, no abnormal bruising  noted  Back/Spine: no abnormalities noted  Musculoskeletal: full ROM all extremities good strength  no deformities  Extremities: no edema, cyanosis  Neurological:  Grossly normal    Results:     Laboratory Data:  Lab Results   Component Value Date    WBC 10.5 01/23/2025    HGB 12.5 (L) 01/23/2025    HCT 36.5 (L) 01/23/2025    .0 01/23/2025    CREATSERUM 1.61 (H) 01/23/2025    BUN 37 (H) 01/23/2025     01/23/2025    K 3.6 01/23/2025    K 3.6 01/23/2025     01/23/2025    CO2 25.0 01/23/2025     (H) 01/23/2025    CA 8.2 (L) 01/23/2025    ALB 3.8 01/19/2025    ALKPHO 94 01/19/2025    BILT 0.4 01/19/2025    TP 6.0 01/19/2025    AST 37 (H) 01/19/2025    ALT 27 01/19/2025    PTT 28.7 01/19/2025    INR 1.10 01/19/2025    TSH 1.080 03/20/2023    GGT 67 03/26/2022    PSA 0.23 06/17/2022    MG 1.7 01/23/2025    PHOS 3.9 01/21/2025    B12 952 03/20/2023    ETOH 236 (H) 05/12/2022       Imaging:  [unfilled]   No results found.      ASSESSMENT/PLAN:   Assessment       #1 ALTA due to BPH and sepsis on Flomax creatinine coming down 1.6 today we will DC IV fluids  #2 low potassium better  Given extra dose of K-Dur today  #3 urinary infection   Klebsiella Pseudomonas and Enterococcus  ID is stopping meropenem and ampicillin starting cefepime.  Can complete course February 2 on oral Cipro  Continue present plan    1/23/2025  Colby Tucker MD

## 2025-01-24 NOTE — PROGRESS NOTES
[facility]  part of [facility]    Progress Note    Sumeet Keith Patient Status:  Inpatient    1945 MRN [id number]   Location [facility] 5SW/SE Attending Nara Sherwood DO   Hosp Day # 5 PCP Arturo Mariscal DO     Chief complaint uti     Subjective:   Sumeet Keith is a(n) 79 year old male Pt doing well today. No c/o's     ROS:   No cp, sob   No c/d   No n/v     Objective:   Blood pressure 135/80, pulse 91, temperature 98.3 °F (36.8 °C), temperature source Oral, resp. rate 18, height 5' 9\" (1.753 m), weight 153 lb (69.4 kg), SpO2 98%.      Intake/Output Summary (Last 24 hours) at 2025 1722  Last data filed at 2025 1149  Gross per 24 hour   Intake 350 ml   Output 2175 ml   Net -1825 ml       Patient Weight(s) for the past 336 hrs:   Weight   25 0542 153 lb (69.4 kg)   25 0643 147 lb 6.4 oz (66.9 kg)   25 0443 148 lb 14.4 oz (67.5 kg)   25 2339 145 lb 4.8 oz (65.9 kg)   25 1833 160 lb (72.6 kg)           General appearance: alert, appears stated age and cooperative  Pulmonary:  clear to auscultation bilaterally  Cardiovascular: S1, S2 normal, no murmur, click, rub or gallop, regular rate and rhythm  Abdominal: soft, non-tender; bowel sounds normal; no masses,  no organomegaly, spc in place   Extremities: extremities normal, atraumatic, no cyanosis or edema        Medicines:     Current Facility-Administered Medications   Medication Dose Route Frequency    ceFEPIme (Maxipime) 1 g in sodium chloride 0.9% 100 mL IVPB-MBP  1 g Intravenous Q12H    tamsulosin (Flomax) cap 0.4 mg  0.4 mg Oral Daily    mupirocin (Bactroban) 2% nasal ointment 1 Application  1 Application Each Nare BID    LORazepam (Ativan) tab 1 mg  1 mg Oral Q1H PRN    Or    LORazepam (Ativan) tab 2 mg  2 mg Oral Q1H PRN    atorvastatin (Lipitor) tab 80 mg  80 mg Oral Nightly    sertraline (Zoloft) tab 75 mg  75 mg Oral Daily    folic acid (Folvite) tab 1 mg  1 mg Oral Daily     heparin (Porcine) 5000 UNIT/ML injection 5,000 Units  5,000 Units Subcutaneous 2 times per day    acetaminophen (Tylenol Extra Strength) tab 500 mg  500 mg Oral Q4H PRN    cyanocobalamin (Vitamin B12) tab 100 mcg  100 mcg Oral Daily           Ant-Infective Medications            ciprofloxacin 500 MG Oral Tab                Blood Pressure and Cardiac Medications            amLODIPine 5 MG Oral Tab                      Lab Results   Component Value Date    WBC 11.9 (H) 01/24/2025    HGB 12.1 (L) 01/24/2025    HCT 34.8 (L) 01/24/2025    .0 01/24/2025    CREATSERUM 1.44 (H) 01/24/2025    BUN 26 (H) 01/24/2025     01/24/2025    K 3.7 01/24/2025     01/24/2025    CO2 27.0 01/24/2025     (H) 01/24/2025    CA 8.1 (L) 01/24/2025    ALB 3.8 01/19/2025    ALKPHO 94 01/19/2025    BILT 0.4 01/19/2025    TP 6.0 01/19/2025    AST 37 (H) 01/19/2025    ALT 27 01/19/2025    PTT 28.7 01/19/2025    INR 1.10 01/19/2025    TSH 1.080 03/20/2023    GGT 67 03/26/2022    PSA 0.23 06/17/2022    MG 1.6 01/24/2025    PHOS 3.9 01/21/2025    B12 952 03/20/2023    ETOH 236 (H) 05/12/2022       No results found.        Results:     CBC:    Lab Results   Component Value Date    WBC 11.9 (H) 01/24/2025    WBC 10.5 01/23/2025    WBC 11.6 (H) 01/22/2025     Lab Results   Component Value Date    HGB 12.1 (L) 01/24/2025    HGB 12.5 (L) 01/23/2025    HGB 11.5 (L) 01/22/2025      Lab Results   Component Value Date    .0 01/24/2025    .0 01/23/2025    .0 01/22/2025       Recent Labs   Lab 01/22/25  0559 01/23/25  0536 01/24/25  0543   * 182* 145*   BUN 57* 37* 26*   CREATSERUM 1.83* 1.61* 1.44*   CA 8.1* 8.2* 8.1*    140 143   K 3.3* 3.6  3.6 3.7    106 106   CO2 23.0 25.0 27.0           Assessment and Plan:      Acute urinary retention due to capped catheter - resolved     Complicated UTI in setting of chronic indwelling catheter  - apprec id. Stop meropenem. On cipro until 2/2      Probable sepsis tachycardia, tachypnea, elevated lactic acid.  With abdominal pain, may be due to urinary retention as well.  - improved   - bp now normal     Chronic urinary retention s/p chronic SPT    Bilateral hydroureter with left hydronephrosis likely due to urinary retention    Acute kidney injury due to urinary retention  - apprec renal consult - improved with bicarb / ivfs  - check daily bmp   - renal us reviewed - b/l nonobstructing stones      Altered mental status, likely toxic encephalopathy.    - near baseline   -Head CT - no acute process - now seems to be at baseline     ALTA  - renal consulted  - cr improving now 1.6  - strict I/o and daily wts          Anxiety,  CAD,  Depression,  HTN, hold medications for now due to low BP - improving  HLD   -Home meds as appropriate     Quality:  DVT Prophylaxis: Heparin  CODE status: Full code  KARO: TBD       Dispo: Home tomorrow         Nara Sherwood,          Chart reviewed, including current vitals, notes, labs and imaging  Labs ordered and medications adjusted as outlined above  Coordinate care with care team/consultants  Discussed with patient results of tests, management plan as outlined above, and the need for ongoing hospitalization  D/w RN     [facility]        1/23/2025             Supplementary Documentation:   DVT Mechanical Prophylaxis:     Early ambuation  DVT Pharmacologic Prophylaxis   Medication    heparin (Porcine) 5000 UNIT/ML injection 5,000 Units                Code Status: Full Code  Chery: Suprapubic catheter in place  Chery Duration (in days):   Central line:    KARO: 1/25/2025

## 2025-01-25 VITALS
OXYGEN SATURATION: 97 % | DIASTOLIC BLOOD PRESSURE: 81 MMHG | SYSTOLIC BLOOD PRESSURE: 135 MMHG | WEIGHT: 153 LBS | TEMPERATURE: 98 F | HEIGHT: 69 IN | RESPIRATION RATE: 16 BRPM | BODY MASS INDEX: 22.66 KG/M2 | HEART RATE: 87 BPM

## 2025-01-25 LAB
ANION GAP SERPL CALC-SCNC: 9 MMOL/L (ref 0–18)
BUN BLD-MCNC: 21 MG/DL (ref 9–23)
BUN/CREAT SERPL: 14.6 (ref 10–20)
CALCIUM BLD-MCNC: 8.6 MG/DL (ref 8.7–10.4)
CHLORIDE SERPL-SCNC: 103 MMOL/L (ref 98–112)
CO2 SERPL-SCNC: 28 MMOL/L (ref 21–32)
CREAT BLD-MCNC: 1.44 MG/DL
EGFRCR SERPLBLD CKD-EPI 2021: 50 ML/MIN/1.73M2 (ref 60–?)
GLUCOSE BLD-MCNC: 167 MG/DL (ref 70–99)
MAGNESIUM SERPL-MCNC: 1.6 MG/DL (ref 1.6–2.6)
OSMOLALITY SERPL CALC.SUM OF ELEC: 297 MOSM/KG (ref 275–295)
POTASSIUM SERPL-SCNC: 3.5 MMOL/L (ref 3.5–5.1)
SODIUM SERPL-SCNC: 140 MMOL/L (ref 136–145)

## 2025-01-25 RX ORDER — TAMSULOSIN HYDROCHLORIDE 0.4 MG/1
0.4 CAPSULE ORAL DAILY
Qty: 30 CAPSULE | Refills: 0 | Status: SHIPPED | OUTPATIENT
Start: 2025-01-26 | End: 2025-02-25

## 2025-01-25 RX ORDER — MAGNESIUM OXIDE 400 MG/1
400 TABLET ORAL ONCE
Status: COMPLETED | OUTPATIENT
Start: 2025-01-25 | End: 2025-01-25

## 2025-01-25 NOTE — CM/SW NOTE
01/25/25 1031   Discharge disposition   Expected discharge disposition Home or Self   Post Acute Care Provider Other  ([facility])   Discharge transportation [facility]     The patient received a MDO for Discharge.    The patient will be transported home to [facility] at 12:30pm.  PCS complete.  The quote for the [facility] is $35 and the patient is agreeable.    The patient declined all Home Health services.     The patient has no questions at this time.    SHELLY/CM to remain available for support and/or discharge planning.     Vicky Ledbetter MSW, LSW  Discharge Planner [id number]

## 2025-01-25 NOTE — PLAN OF CARE
Safety precautions in place. Call light within reach.  Problem: PAIN - ADULT  Goal: Verbalizes/displays adequate comfort level or patient's stated pain goal  Description: INTERVENTIONS:  - Encourage pt to monitor pain and request assistance  - Assess pain using appropriate pain scale  - Administer analgesics based on type and severity of pain and evaluate response  - Implement non-pharmacological measures as appropriate and evaluate response  - Consider cultural and social influences on pain and pain management  - Manage/alleviate anxiety  - Utilize distraction and/or relaxation techniques  - Monitor for opioid side effects  - Notify MD/LIP if interventions unsuccessful or patient reports new pain  - Anticipate increased pain with activity and pre-medicate as appropriate  Outcome: Adequate for Discharge     Problem: RISK FOR INFECTION - ADULT  Goal: Absence of fever/infection during anticipated neutropenic period  Description: INTERVENTIONS  - Monitor WBC  - Administer growth factors as ordered  - Implement neutropenic guidelines  Outcome: Adequate for Discharge     Problem: SAFETY ADULT - FALL  Goal: Free from fall injury  Description: INTERVENTIONS:  - Assess pt frequently for physical needs  - Identify cognitive and physical deficits and behaviors that affect risk of falls.  - [place] fall precautions as indicated by assessment.  - Educate pt/family on patient safety including physical limitations  - Instruct pt to call for assistance with activity based on assessment  - Modify environment to reduce risk of injury  - Provide assistive devices as appropriate  - Consider OT/PT consult to assist with strengthening/mobility  - Encourage toileting schedule  Outcome: Adequate for Discharge     Problem: DISCHARGE PLANNING  Goal: Discharge to home or other facility with appropriate resources  Description: INTERVENTIONS:  - Identify barriers to discharge w/pt and caregiver  - Include patient/family/discharge partner in  discharge planning  - Arrange for needed discharge resources and transportation as appropriate  - Identify discharge learning needs (meds, wound care, etc)  - Arrange for interpreters to assist at discharge as needed  - Consider post-discharge preferences of patient/family/discharge partner  - Complete POLST form as appropriate  - Assess patient's ability to be responsible for managing their own health  - Refer to Case Management Department for coordinating discharge planning if the patient needs post-hospital services based on physician/LIP order or complex needs related to functional status, cognitive ability or social support system  Outcome: Adequate for Discharge     Problem: Patient Centered Care  Goal: Patient preferences are identified and integrated in the patient's plan of care  Description: Interventions:  - What would you like us to know as we care for you? From [place]  - Provide timely, complete, and accurate information to patient/family  - Incorporate patient and family knowledge, values, beliefs, and cultural backgrounds into the planning and delivery of care  - Encourage patient/family to participate in care and decision-making at the level they choose  - Honor patient and family perspectives and choices  Outcome: Adequate for Discharge     Problem: Impaired Cognition  Goal: Patient will exhibit improved attention, thought processing and/or memory  Description: Interventions:    Outcome: Adequate for Discharge

## 2025-01-25 NOTE — PLAN OF CARE
Safety precautions in place. Call light within reach.  Problem: PAIN - ADULT  Goal: Verbalizes/displays adequate comfort level or patient's stated pain goal  Description: INTERVENTIONS:  - Encourage pt to monitor pain and request assistance  - Assess pain using appropriate pain scale  - Administer analgesics based on type and severity of pain and evaluate response  - Implement non-pharmacological measures as appropriate and evaluate response  - Consider cultural and social influences on pain and pain management  - Manage/alleviate anxiety  - Utilize distraction and/or relaxation techniques  - Monitor for opioid side effects  - Notify MD/LIP if interventions unsuccessful or patient reports new pain  - Anticipate increased pain with activity and pre-medicate as appropriate  Outcome: Adequate for Discharge     Problem: RISK FOR INFECTION - ADULT  Goal: Absence of fever/infection during anticipated neutropenic period  Description: INTERVENTIONS  - Monitor WBC  - Administer growth factors as ordered  - Implement neutropenic guidelines  Outcome: Adequate for Discharge     Problem: SAFETY ADULT - FALL  Goal: Free from fall injury  Description: INTERVENTIONS:  - Assess pt frequently for physical needs  - Identify cognitive and physical deficits and behaviors that affect risk of falls.  - [place] fall precautions as indicated by assessment.  - Educate pt/family on patient safety including physical limitations  - Instruct pt to call for assistance with activity based on assessment  - Modify environment to reduce risk of injury  - Provide assistive devices as appropriate  - Consider OT/PT consult to assist with strengthening/mobility  - Encourage toileting schedule  Outcome: Adequate for Discharge     Problem: DISCHARGE PLANNING  Goal: Discharge to home or other facility with appropriate resources  Description: INTERVENTIONS:  - Identify barriers to discharge w/pt and caregiver  - Include patient/family/discharge partner in  discharge planning  - Arrange for needed discharge resources and transportation as appropriate  - Identify discharge learning needs (meds, wound care, etc)  - Arrange for interpreters to assist at discharge as needed  - Consider post-discharge preferences of patient/family/discharge partner  - Complete POLST form as appropriate  - Assess patient's ability to be responsible for managing their own health  - Refer to Case Management Department for coordinating discharge planning if the patient needs post-hospital services based on physician/LIP order or complex needs related to functional status, cognitive ability or social support system  Outcome: Adequate for Discharge     Problem: Patient Centered Care  Goal: Patient preferences are identified and integrated in the patient's plan of care  Description: Interventions:  - What would you like us to know as we care for you? From [place]  - Provide timely, complete, and accurate information to patient/family  - Incorporate patient and family knowledge, values, beliefs, and cultural backgrounds into the planning and delivery of care  - Encourage patient/family to participate in care and decision-making at the level they choose  - Honor patient and family perspectives and choices  Outcome: Adequate for Discharge     Problem: Impaired Cognition  Goal: Patient will exhibit improved attention, thought processing and/or memory  Description: Interventions:    Outcome: Adequate for Discharge

## 2025-01-25 NOTE — PLAN OF CARE
Problem: Patient Centered Care  Goal: Patient preferences are identified and integrated in the patient's plan of care  Description: Interventions:  - What would you like us to know as we care for you? From [facility]  - Provide timely, complete, and accurate information to patient/family  - Incorporate patient and family knowledge, values, beliefs, and cultural backgrounds into the planning and delivery of care  - Encourage patient/family to participate in care and decision-making at the level they choose  - Honor patient and family perspectives and choices  Outcome: Progressing     Problem: PAIN - ADULT  Goal: Verbalizes/displays adequate comfort level or patient's stated pain goal  Description: INTERVENTIONS:  - Encourage pt to monitor pain and request assistance  - Assess pain using appropriate pain scale  - Administer analgesics based on type and severity of pain and evaluate response  - Implement non-pharmacological measures as appropriate and evaluate response  - Consider cultural and social influences on pain and pain management  - Manage/alleviate anxiety  - Utilize distraction and/or relaxation techniques  - Monitor for opioid side effects  - Notify MD/LIP if interventions unsuccessful or patient reports new pain  - Anticipate increased pain with activity and pre-medicate as appropriate  Outcome: Progressing     Problem: RISK FOR INFECTION - ADULT  Goal: Absence of fever/infection during anticipated neutropenic period  Description: INTERVENTIONS  - Monitor WBC  - Administer growth factors as ordered  - Implement neutropenic guidelines  Outcome: Progressing     Problem: SAFETY ADULT - FALL  Goal: Free from fall injury  Description: INTERVENTIONS:  - Assess pt frequently for physical needs  - Identify cognitive and physical deficits and behaviors that affect risk of falls.  - [place] fall precautions as indicated by assessment.  - Educate pt/family on patient safety including  physical limitations  - Instruct pt to call for assistance with activity based on assessment  - Modify environment to reduce risk of injury  - Provide assistive devices as appropriate  - Consider OT/PT consult to assist with strengthening/mobility  - Encourage toileting schedule  Outcome: Progressing     Problem: Impaired Cognition  Goal: Patient will exhibit improved attention, thought processing and/or memory  Description: Interventions:  - Minimize distractions in the room when full attention is required  - Allow additional time for processing after asking questions or providing instructions  Outcome: Progressing       Problem: DISCHARGE PLANNING  Goal: Discharge to home or other facility with appropriate resources  Description: INTERVENTIONS:  - Identify barriers to discharge w/pt and caregiver  - Include patient/family/discharge partner in discharge planning  - Arrange for needed discharge resources and transportation as appropriate  - Identify discharge learning needs (meds, wound care, etc)  - Arrange for interpreters to assist at discharge as needed  - Consider post-discharge preferences of patient/family/discharge partner  - Complete POLST form as appropriate  - Assess patient's ability to be responsible for managing their own health  - Refer to Case Management Department for coordinating discharge planning if the patient needs post-hospital services based on physician/LIP order or complex needs related to functional status, cognitive ability or social support system  Outcome: Progressing

## 2025-01-27 ENCOUNTER — PATIENT OUTREACH (OUTPATIENT)
Dept: CASE MANAGEMENT | Age: 80
End: 2025-01-27

## 2025-01-27 NOTE — PROGRESS NOTES
Attempted to contact pt for transitions of care however tried to call the pt twice and busy tone was note both times. NCM to try again at a later time.

## 2025-01-28 NOTE — PROGRESS NOTES
Attempted to contact pt for transitions of care however no answer. Call continued to ring and did not go to a . NCM to try again at a later time.

## 2025-01-30 NOTE — PROGRESS NOTES
Attempted to contact pt for transitions of care however no answer. Call continued to ring, then went silent with no response. Call was then ended. NCM to try again at a later time.

## 2025-02-07 NOTE — PROGRESS NOTES
Physician Clarification    Additional information related to the patient's condition    Uti is associated with suprpubic catheter     This note is part of the patient's medical record.

## 2025-02-11 ENCOUNTER — TELEPHONE (OUTPATIENT)
Dept: FAMILY MEDICINE CLINIC | Facility: CLINIC | Age: 80
End: 2025-02-11

## 2025-02-11 NOTE — TELEPHONE ENCOUNTER
Ne (physical therapist)/[facility] calling with an update.     She saw patient for home care last week of Jan for his evaluation.     Again today for follow-up. Doing fine from a physical therapy view. No loss of balance and walking fine, viewed him walking to the dining room for his meal. He is using a walker.     Based on her observations, she is discharging him from physical therapy.     She will also be sending a discharge summary as well.       ,

## 2025-02-13 ENCOUNTER — MED REC SCAN ONLY (OUTPATIENT)
Dept: FAMILY MEDICINE CLINIC | Facility: CLINIC | Age: 80
End: 2025-02-13

## 2025-02-13 NOTE — TELEPHONE ENCOUNTER
Please review; protocol failed/No Protocol    No Active/future labs pended     Requested Prescriptions   Pending Prescriptions Disp Refills    AMLODIPINE 5 MG Oral Tab [Pharmacy Med Name: AMLODIPINE BESYLATE 5 MG TAB] 90 tablet 0     Sig: Take 1 tablet (5 mg total) by mouth daily.       Hypertension Medications Protocol Failed - 2/13/2025 12:08 PM        Failed - EGFRCR or GFRNAA > 50     GFR Evaluation  EGFRCR: 50 , resulted on 1/25/2025          Passed - CMP or BMP in past 12 months        Passed - Last BP reading less than 140/90     BP Readings from Last 1 Encounters:   01/25/25 135/81               Passed - In person appointment or virtual visit in the past 12 mos or appointment in next 3 mos     Recent Outpatient Visits              7 months ago Chronic retention of urine    [facility], Arturo Xiong DO    Office Visit    1 year ago Atonic bladder    [facility], Marion Oleary APRN    Office Visit    1 year ago Atonic urinary bladder    [facility], Arturo Xiong DO    Office Visit    1 year ago Well adult exam    [facility], [place] APRN    Office Visit    2 years ago Urinary retention    [facility], Jerry Thompson MD    Office Visit                      Passed - Medication is active on med list          ATORVASTATIN 80 MG Oral Tab [Pharmacy Med Name: ATORVASTATIN 80 MG TABLET] 90 tablet 0     Sig: TAKE 1 TABLET BY MOUTH every night at bedtime       Cholesterol Medication Protocol Failed - 2/13/2025 12:08 PM        Failed - Lipid panel within past 12 months     Lab Results   Component Value Date    CHOLEST 105 03/20/2023    TRIG 106 03/20/2023    HDL 50 03/20/2023    LDL 35 03/20/2023    VLDL 14 03/20/2023    NONHDLC 55 03/20/2023             Passed - ALT < 80     Lab Results    Component Value Date    ALT 27 01/19/2025             Passed - ALT resulted within past year        Passed - In person appointment or virtual visit in the past 12 mos or appointment in next 3 mos     Recent Outpatient Visits              7 months ago Chronic retention of urine    [facility] [place]Arturo Noland, DO    Office Visit    1 year ago [facility] bladder    [facility] Marion Oleary APRN    Office Visit    1 year ago [facility] urinary bladder    [facility] Arturo Xiong, DO    Office Visit    1 year ago Well adult exam    [facility] Krissy Sevilla APRN    Office Visit    2 years ago [facility]    [facility]                      Passed - Medication is active on med list          FOLIC ACID 1 MG Oral Tab [Pharmacy Med Name: FOLIC ACID 1 MG TABLET] 90 tablet 0     Sig: Take 1 tablet (1 mg total) by mouth daily.       There is no refill protocol information for this order          Recent Outpatient Visits              7 months ago Chronic retention of urine    [facility], Arturo Xiong, DO    Office Visit    1 year ago [facility] bladder    [facility] Marion Oleary APRN    Office Visit    1 year ago [facility] urinary bladder    [facility] Arturo Xiong,     Office Visit    1 year ago Well adult exam    [facility] [place]Krissy Dennison APRN    Office Visit    2 years ago Urinary retention    [facility]    Office Visit

## 2025-02-14 RX ORDER — AMLODIPINE BESYLATE 5 MG/1
5 TABLET ORAL DAILY
Qty: 90 TABLET | Refills: 0 | Status: SHIPPED | OUTPATIENT
Start: 2025-02-14

## 2025-02-14 RX ORDER — FOLIC ACID 1 MG/1
1 TABLET ORAL DAILY
Qty: 90 TABLET | Refills: 0 | Status: SHIPPED | OUTPATIENT
Start: 2025-02-14

## 2025-02-14 RX ORDER — ATORVASTATIN CALCIUM 80 MG/1
80 TABLET, FILM COATED ORAL NIGHTLY
Qty: 90 TABLET | Refills: 0 | Status: SHIPPED | OUTPATIENT
Start: 2025-02-14

## 2025-02-20 RX ORDER — AMLODIPINE BESYLATE 5 MG/1
5 TABLET ORAL DAILY
Qty: 90 TABLET | Refills: 0 | OUTPATIENT
Start: 2025-02-20

## 2025-02-20 RX ORDER — ATORVASTATIN CALCIUM 80 MG/1
80 TABLET, FILM COATED ORAL NIGHTLY
Qty: 90 TABLET | Refills: 0 | OUTPATIENT
Start: 2025-02-20

## 2025-02-20 RX ORDER — FOLIC ACID 1 MG/1
1 TABLET ORAL DAILY
Qty: 90 TABLET | Refills: 0 | OUTPATIENT
Start: 2025-02-20

## 2025-02-21 ENCOUNTER — TELEPHONE (OUTPATIENT)
Dept: FAMILY MEDICINE CLINIC | Facility: CLINIC | Age: 80
End: 2025-02-21

## 2025-02-21 NOTE — TELEPHONE ENCOUNTER
Calderon from [facility] called asking if she can re-certify the patient to continue home care services.

## 2025-02-24 NOTE — TELEPHONE ENCOUNTER
Spoke to Mini and gave verbal okay, she verbalized understanding and had no questions at this time.

## 2025-03-10 ENCOUNTER — TELEPHONE (OUTPATIENT)
Dept: FAMILY MEDICINE CLINIC | Facility: CLINIC | Age: 80
End: 2025-03-10

## 2025-03-10 DIAGNOSIS — R39.12 BENIGN PROSTATIC HYPERPLASIA WITH WEAK URINARY STREAM: Primary | ICD-10-CM

## 2025-03-10 DIAGNOSIS — N40.1 BENIGN PROSTATIC HYPERPLASIA WITH WEAK URINARY STREAM: Primary | ICD-10-CM

## 2025-03-12 RX ORDER — SERTRALINE HYDROCHLORIDE 25 MG/1
75 TABLET, FILM COATED ORAL DAILY
Qty: 270 TABLET | Refills: 0 | Status: SHIPPED | OUTPATIENT
Start: 2025-03-12

## 2025-03-12 RX ORDER — PYRIDOXINE HCL (VITAMIN B6) 50 MG
1 TABLET ORAL DAILY
Qty: 90 TABLET | Refills: 0 | Status: SHIPPED | OUTPATIENT
Start: 2025-03-12

## 2025-03-25 RX ORDER — TAMSULOSIN HYDROCHLORIDE 0.4 MG/1
0.4 CAPSULE ORAL DAILY
Qty: 90 CAPSULE | Refills: 0 | Status: SHIPPED | OUTPATIENT
Start: 2025-03-25

## 2025-04-24 ENCOUNTER — TELEPHONE (OUTPATIENT)
Dept: FAMILY MEDICINE CLINIC | Facility: CLINIC | Age: 80
End: 2025-04-24

## 2025-04-25 NOTE — TELEPHONE ENCOUNTER
Fax received. Per fax they received verbal okay from Zunilda. No further action is needed at this time.

## 2025-05-06 ENCOUNTER — MED REC SCAN ONLY (OUTPATIENT)
Dept: FAMILY MEDICINE CLINIC | Facility: CLINIC | Age: 80
End: 2025-05-06

## 2025-05-21 RX ORDER — AMLODIPINE BESYLATE 5 MG/1
5 TABLET ORAL DAILY
Qty: 90 TABLET | Refills: 3 | Status: SHIPPED | OUTPATIENT
Start: 2025-05-21

## 2025-05-21 RX ORDER — ATORVASTATIN CALCIUM 80 MG/1
80 TABLET, FILM COATED ORAL NIGHTLY
Qty: 90 TABLET | Refills: 3 | Status: SHIPPED | OUTPATIENT
Start: 2025-05-21

## 2025-05-21 RX ORDER — FOLIC ACID 1 MG/1
1 TABLET ORAL DAILY
Qty: 90 TABLET | Refills: 3 | Status: SHIPPED | OUTPATIENT
Start: 2025-05-21

## 2025-05-23 RX ORDER — FOLIC ACID 1 MG/1
1 TABLET ORAL DAILY
Qty: 90 TABLET | Refills: 0 | OUTPATIENT
Start: 2025-05-23

## 2025-05-23 RX ORDER — ATORVASTATIN CALCIUM 80 MG/1
80 TABLET, FILM COATED ORAL NIGHTLY
Qty: 90 TABLET | Refills: 0 | OUTPATIENT
Start: 2025-05-23

## 2025-05-23 RX ORDER — AMLODIPINE BESYLATE 5 MG/1
5 TABLET ORAL DAILY
Qty: 90 TABLET | Refills: 0 | OUTPATIENT
Start: 2025-05-23

## 2025-06-10 ENCOUNTER — TELEPHONE (OUTPATIENT)
Dept: FAMILY MEDICINE CLINIC | Facility: CLINIC | Age: 80
End: 2025-06-10

## 2025-06-11 RX ORDER — SERTRALINE HYDROCHLORIDE 25 MG/1
75 TABLET, FILM COATED ORAL DAILY
Qty: 270 TABLET | Refills: 0 | Status: SHIPPED | OUTPATIENT
Start: 2025-06-11

## 2025-06-11 RX ORDER — PYRIDOXINE HCL (VITAMIN B6) 50 MG
1 TABLET ORAL DAILY
Qty: 90 TABLET | Refills: 0 | Status: SHIPPED | OUTPATIENT
Start: 2025-06-11

## 2025-06-11 NOTE — TELEPHONE ENCOUNTER
Please review.  Protocol failed/has no protocol.    Last Office Visit: 06/28/2024      No future appointments.    Emcore message sent to patient to schedule an office visit with Primary Care Provider.   Will also route to Call Center to make a phone attempt.    Requested Prescriptions     Pending Prescriptions Disp Refills    SERTRALINE 25 MG Oral Tab [Pharmacy Med Name: SERTRALINE HCL 25 MG TABLET] 270 tablet 0     Sig: take 3 tablets (75 MG total) by mouth daily.    B-12 100 MCG Oral Tab [Pharmacy Med Name: WM B-12 100MCG TABS] 90 tablet 0     Sig: TAKE 1 TABLET BY MOUTH DAILY

## 2025-06-16 RX ORDER — SERTRALINE HYDROCHLORIDE 25 MG/1
75 TABLET, FILM COATED ORAL DAILY
Qty: 270 TABLET | Refills: 0 | OUTPATIENT
Start: 2025-06-16

## 2025-06-26 RX ORDER — TAMSULOSIN HYDROCHLORIDE 0.4 MG/1
0.4 CAPSULE ORAL DAILY
Qty: 90 CAPSULE | Refills: 0 | Status: SHIPPED | OUTPATIENT
Start: 2025-06-26

## 2025-06-26 NOTE — TELEPHONE ENCOUNTER
Refill passed [facility] protocol.     Sent 90 day per RMA [employer] Medical Message to patient that he is due for appointment.     CohesiveFThart Message has not been read as of 6/26/25.     90 day refill given on 06/26/25, appointment needed for further refills.

## 2025-07-10 ENCOUNTER — TELEPHONE (OUTPATIENT)
Dept: FAMILY MEDICINE CLINIC | Facility: CLINIC | Age: 80
End: 2025-07-10

## 2025-07-10 NOTE — TELEPHONE ENCOUNTER
Received fax from [facility] 7/9/2025 for patients orders. Provider signed paperwork,faxed over with provided fax # awaiting fax confirmation. Fax succuss, placed paperwork in bin to be scanned.

## 2025-08-14 ENCOUNTER — TELEPHONE (OUTPATIENT)
Dept: FAMILY MEDICINE CLINIC | Facility: CLINIC | Age: 80
End: 2025-08-14

## 2025-08-22 RX ORDER — CHOLECALCIFEROL (VITAMIN D3) 25 MCG
1000 CAPSULE ORAL DAILY
Qty: 90 CAPSULE | Refills: 0 | Status: SHIPPED | OUTPATIENT
Start: 2025-08-22

## 2025-08-22 RX ORDER — METHION/INOS/CHOL BT/B COM/LIV 110MG-86MG
1 CAPSULE ORAL DAILY
Qty: 90 TABLET | Refills: 0 | Status: SHIPPED | OUTPATIENT
Start: 2025-08-22

## 2025-08-25 ENCOUNTER — TELEPHONE (OUTPATIENT)
Dept: FAMILY MEDICINE CLINIC | Facility: CLINIC | Age: 80
End: 2025-08-25

## 2025-08-25 DIAGNOSIS — N13.8 HYPERTROPHY OF PROSTATE WITH URINARY OBSTRUCTION: Primary | ICD-10-CM

## 2025-08-25 DIAGNOSIS — N40.1 HYPERTROPHY OF PROSTATE WITH URINARY OBSTRUCTION: Primary | ICD-10-CM

## (undated) DIAGNOSIS — Z87.19 HISTORY OF BILATERAL INGUINAL HERNIA REPAIR: ICD-10-CM

## (undated) DIAGNOSIS — G24.5 BLEPHAROSPASM OF RIGHT EYE: Primary | ICD-10-CM

## (undated) DIAGNOSIS — R10.31 RIGHT INGUINAL PAIN: ICD-10-CM

## (undated) DIAGNOSIS — Z98.890 HISTORY OF BILATERAL INGUINAL HERNIA REPAIR: ICD-10-CM

## (undated) DEVICE — GAMMEX® PI HYBRID SIZE 7.5, STERILE POWDER-FREE SURGICAL GLOVE, POLYISOPRENE AND NEOPRENE BLEND: Brand: GAMMEX

## (undated) DEVICE — GOWN SURG AERO BLUE PERF LG

## (undated) DEVICE — BAG DRAIN INFECTION CNTRL 2000

## (undated) DEVICE — ONE-STEP SUPRAPUBIC INTRODUCER: Brand: COOK

## (undated) DEVICE — SUT ETHILON 2-0 FS 664H

## (undated) DEVICE — BLADE 11 SHRP BP SS SRG STRL

## (undated) DEVICE — NEEDLE SPINAL 22X5 405148

## (undated) DEVICE — TOWEL SURG OR 17X30IN BLUE

## (undated) DEVICE — GAUZE SPONGES: Brand: DEROYAL

## (undated) DEVICE — SLEEVE KENDALL SCD EXPRESS MED

## (undated) DEVICE — Device: Brand: JELCO

## (undated) DEVICE — STERILE WATER 1000ML BTL

## (undated) DEVICE — CATH BARDEX IC FOLEY 18FR 5CC

## (undated) DEVICE — MEDI-VAC NON-CONDUCTIVE SUCTION TUBING: Brand: CARDINAL HEALTH

## (undated) DEVICE — GAUZE TRAY STERILE 4X4 12PLY

## (undated) DEVICE — SKIN PREP TRAY 4 COMPARTM TRAY: Brand: MEDLINE INDUSTRIES, INC.

## (undated) DEVICE — CYSTO PACK: Brand: MEDLINE INDUSTRIES, INC.

## (undated) DEVICE — MEGADYNE ELECTRODE ADULT PT RT

## (undated) DEVICE — UROLOGY DRAIN BAG

## (undated) DEVICE — 12 ML SYRINGE LUER-LOCK TIP: Brand: MONOJECT

## (undated) DEVICE — SOLUTION  .9 3000ML

## (undated) DEVICE — SYRINGE,TOOMEY,IRRIGATION,70CC,STERILE: Brand: MEDLINE

## (undated) DEVICE — SOL H2O 1000ML

## (undated) NOTE — LETTER
09/18/19    Gigi [address]      Dear Yu Palomino,    [facility] records indicate that you have outstanding lab work and or testing that was ordered for you and has not yet been completed:  Orders Placed This Encounter      Miscellaneous Testing [E]    To provide you with the best possible care, please complete these orders at your earliest convenience. If you have recently completed these orders please disregard this letter. If you have any questions please call the office at Dept: [phone].      Thank you,       Anayeli Chavez DO

## (undated) NOTE — LETTER
3/24/2023              [address] [address]         Dear Avi Apley,    This letter is to inform you that our office has made several attempts to reach you by phone without success. We were attempting to contact you by phone regarding from request.    Please contact our office at the number listed below as soon as you receive this letter to discuss this issue and to make the necessary changes in our system to your contact information. Thank you for your cooperation. Sincerely,    ANGELI Tarango  [facility], [address], [place]  [address] [facility] [address]  [phone]        Document electronically generated by:   Rock Josette CMA

## (undated) NOTE — IP AVS SNAPSHOT
[facility]            (For Outpatient Use Only) Initial Admit Date: 10/17/2022   Inpt/Obs Admit Date: Inpt: 10/18/22 / Obs: N/A   Discharge Date:    Tyler Knee:  [de-identified]   MRN: [de-identified]   CSN: [id number]   CEID: [id number]        ENCOUNTER  Patient Class: Inpatient Admitting Provider: Gen Hamilton MD Unit: [address] [place] [address]/   Hospital Service: Medical Attending Provider: Roberto Tapia MD   Bed: 418-A   Visit Type:   Referring Physician: No ref. provider found Billing Flag:    Admit Diagnosis: Acute cystitis without hematuria [N30.00]      PATIENT  Legal Name:   Jose Ovalles   Legal Sex: Male  Gender ID:              Pref Name:   Robbie Yas PCP:  Dagmar Baez DO Home: [address]   Address:  [facility] : 1945 (77 yrs) Mobile: [phone]         City/[facility]/Zip: [facility] Marital:  Language: Dottietta Avel: [place] SSN4: [id number] Spiritism: Mandaen - No  *     Race: White Ethnicity: Non  Or [address]   Name Relationship Legal Guardian? Home Phone Work Phone Mobile Phone   1.  Flavia Keith  2. *No Contact Specified* Spouse      [phone]       GUARANTOR  Guarantor: Tamie Mehta : 1945 Home Phone: [phone]   Address: [facility]  Sex: Male Work Phone:    City/State/Zip: [place], [place]   Rel. to Patient: Self Guarantor ID: [id number]   [address]   Employer:  Status: RETIRED     COVERAGE  PRIMARY INSURANCE   Payor: MEDICARE Plan: MEDICARE PART A&B   Group Number:  Insurance Type: INDEMNITY   Subscriber Name: Thelma Curtis : 1945   Subscriber ID: [id number] Pt Rel to Subscriber: Self   SECONDARY INSURANCE   Payor[place] Plan: [place] PPO   Group Number: [id number] Insurance Type: [address] Name: Tamie Mehta Subscriber : 1945   Subscriber ID: [id number] Pt Rel to Subscriber: SELF   TERTIARY INSURANCE   Payor:  Plan: Group Number:  Insurance Type:    Subscriber Name:  Subscriber :    Subscriber ID:  Pt Rel to Subscriber:    Hospital Account Financial Class: Medicare    2022

## (undated) NOTE — IP AVS SNAPSHOT
[facility]            (For Outpatient Use Only) Initial Admit Date: 2022   Inpt/Obs Admit Date: Inpt: 22 / Obs: 22   Discharge Date:    Watson Devi:  [de-identified]   MRN: [de-identified]   CSN: [id number]   CEID: [id number]        ENCOUNTER  Patient Class: Inpatient Admitting Provider: Hal Centeno MD Unit: [address] [place], [place] [address]W/SE   Hospital Service: Cardiac Telemetry Attending Provider: Kaye Mcmillan MD   Bed: 542-A   Visit Type:   Referring Physician: No ref. provider found Billing Flag:    Admit Diagnosis: Alcohol withdrawal syndrome with complication [facility]) [N18.030]      PATIENT  Legal Name:   Lisa Quiroz   Legal Sex: Male  Gender ID:              [address] [place] [address],[address] Name:    PCP:  Aisha Mobley DO Home: [facility] [address]   Address:  [facility] : 1945 (76 yrs) Mobile: [phone]         City/State/Zip: [place] [address] Marital:  Language: Joy mehta: [place] SSN4: [id number] Jew: Hinduism - No  *     Race: White Ethnicity: Non  Or [address] [place] [address]   Name Relationship Legal Guardian? Home Phone Work Phone Mobile Phone   1.  Flavia Keith  2. *No Contact Specified* Spouse      [phone]       GUARANTOR  Guarantor: Nicci Mcdaniel : 1945 Home Phone: [phone]   Address: [facility]  Sex: Male Work Phone:    City/State/Zip: [facility]   Rel. to Patient: Self Guarantor ID: [id number]   [address]   Employer:  Status: RETIRED     COVERAGE  PRIMARY INSURANCE   Payor: MEDICARE Plan: MEDICARE PART A&B   Group Number:  Insurance Type: INDEMNITY   Subscriber Name: Nicci Mcdaniel Subscriber : 1945   Subscriber ID: [id number] Pt Rel to Subscriber: Self   SECONDARY INSURANCE   Payor: [place] Plan: [place] PPO   Group Number: [id number] Insurance Type: [address] Name: Nicci Mcdaniel Subscriber : 1945   Subscriber ID: [id number] Pt Rel to Subscriber: SELF   TERTIARY INSURANCE Payor:  Plan:    Group Number:  Insurance Type:    Subscriber Name:  Subscriber :    Subscriber ID:  Pt Rel to Subscriber:    Hospital Account Financial Class: Medicare    2022

## (undated) NOTE — LETTER
4/4/2022              Danilo Carrillo Sagar        [facility]        [address] [address]         To whom it may concern,    Valeriy Woods is currently a patient under my medical care. To whom it may concern, Tamiko Pierre has now become unable to make independent decisions regarding his healthcare due to his alcohol abuse disorder. He needs to be taken to the Emergency Room today for evaluation and treatment of possible dehydration and altered mental status. His memory is waning unfortunately due to the alcohol consumption. Please allow his spouse, Cleveland Lynch to make medical decisions on his behalf. If you require additional information please do not hesitate to contact my office.         Sincerely,     Fabi Moreland DO  [facility], [facility], [address] [place] [address]  [phone]        Document electronically generated by:  Fabi Moreland DO

## (undated) NOTE — LETTER
Patient: Alex Vincent   YOB: 1945   Date of Visit: 8/4/2021     Dear  Dr. Claudette Screws,    Thank you for referring Alex Vincent to my practice. Please find my assessment and plan below.           Non-recurrent unilateral inguinal herni

## (undated) NOTE — MR AVS SNAPSHOT
[facility] SPECIALTY [facility] [place] [address] [address] [id number] [phone]               Thank you for choosing us for your health care visit with Twan Bhatt MD.  We are glad to serve you and happy to provide you with this summary o Take 1 tablet (50 mg total) by mouth every 8 (eight) hours as needed for Pain. For breakthrough pain.    Commonly known as:  ULTRAM                Where to Get Your Medications      These medications were sent to [address] CARD ECHO STRESS ECHO/REST AND STRESS (CPT=93351) [25221]     1 Open [2] Coronary artery disease involving native coronary artery of native heart without angina pectoris [6443968]           Hypercholesteremia [23230]           Hypercholesteremia [82295]

## (undated) NOTE — ED AVS SNAPSHOT
Sandra Pichardo   MRN: [id number]    Department:  [facility] Emergency Department   Date of Visit:  6/25/2019           Disclosure     Insurance plans vary and the physician(s) referred by the ER may not be covered by your plan.  Please contact within the next three months to obtain basic health screening including reassessment of your blood pressure.     IF THERE IS ANY CHANGE OR WORSENING OF YOUR CONDITION, CALL YOUR PRIMARY CARE PHYSICIAN AT ONCE OR RETURN IMMEDIATELY TO THE EMERGENCY DEPARTMEN

## (undated) NOTE — LETTER
[facility]    Consent for Diagnostic Services    Your physician has ordered one of the following tests to determine the function of your heart: NUCLEAR STRESS TEST LEXISCAN. The information obtained will aid your physician in detecting the possible presence of heart disease, to determine why you may have such symptoms such as chest pain or shortness of breath and to determine an appropriate plan of medical management. The risks associated with any exercise test or pharmacological stress test are similar to the risks associated with exercise, including an abnormal blood pressure, dizziness, fainting, abnormal heart rate and in very rear instances heart attach, stroke, or death. During the test you must report any unusual feeling or symptoms you experience during the test. Every effort will be made to minimize such risks by careful observation during the test. Emergency equipment and trained personnel are available to deal with unusual situations, which may arise and these personnel have permission to treat you. During the treadmill or nuclear stress test, the exercise intensity begins at a low level and advances in stages depending on your fitness level. We may stop the test at any time because of signs of fatigue or changes in your heart rate, electrocardiogram, or blood pressure, or other symptoms you may experience. If your doctor has ordered a pharmacological stress test, you may experience a headache, shortness of breath, flushing, warmth, rapid heart rate, or a bitter taste in your mouth. Sometimes you may not experience any symptoms. Your prompt reporting of any symptoms is very important. During a Regadenoson stress test, you are given an injection of Regadenoson. Immediately after the Regadenoson is given, you will be injected with a radioactive isotope. During a Dobutamine stress test, Dobutamine infuses over approximately fifteen minutes.  A radioactive isotope is injected during the infusion. After either pharmacological stress test, you will have either a nuclear scan or an echocardiogram.    The information that is obtained during your testing will be treated as privileged and confidential. The information obtained will be given to your doctor and may be used for insurance purposes or to track and trend data as part of  with your privacy retained. I have read and understand the above information. I voluntarily agree and consent to the above ordered test. Furthermore, no guarantees or assurances have been given by anyone as to the results that may be obtained from this procedure. Any questions that may have occurred to me have been answered to my satisfaction.      Signature of Patient:   ____________________________________________________________    Signature of Witness: _______________________________Date: ___________Time: ________

## (undated) NOTE — LETTER
11/28/2017              [address] [address]         Dear Clarence Aydee,    [facility] records indicate that the blood tests Lipid, AST, and ALT ordered for you by Raudel Gorman MD were due in October 2017 have not been don

## (undated) NOTE — IP AVS SNAPSHOT
[facility]            (For Outpatient Use Only) Initial Admit Date: 5/6/2019   Inpt/Obs Admit Date: Inpt: 5/6/19 / Obs: N/A   Discharge Date:    Tru Gonzalez:  [de-identified]   MRN: [de-identified]   CSN: [id number]   CEID: [id number]        [facility] Subscriber ID:  Pt Rel to Subscriber:    Hospital Account Financial Class: Medicare    May 15, 2019

## (undated) NOTE — LETTER
Patient Name: Laurie Brown  :   MRN: [id number]  Patient Address: [address] [place] [address]      Coronavirus Disease 2019 (COVID-19)     [facility] is committed to the safety and well-being of our patients, members, employ your symptoms get worse, call your healthcare provider immediately. 3. Get rest and stay hydrated.    4. If you have a medical appointment, call the healthcare provider ahead of time and tell them that you have or may have COVID-19.  5. For medical emergen fever-reducing medications; and  · Improvement in respiratory symptoms (e.g., cough, shortness of breath); and  · At least 10 days have passed since symptoms first appeared OR if asymptomatic patient or date of symptom onset is unclear then use 10 days pos donors must:    · Have had a confirmed diagnosis of COVID-19  · Be symptom-free for at least 14 days*    *Some people will be required to have a repeat COVID-19 test in order to be eligible to donate.  If you’re instructed by Rudy that a repeat test is r random. Researchers are trying to identify similarities between people with a Post-COVID condition to better understand if there are risk factors. How do I prevent a Post-COVID condition?   The best way to prevent the long-term symptoms of COVID-19 is

## (undated) NOTE — IP AVS SNAPSHOT
Patient Demographics     Address  [address] Phone  [phone] (Home) *Preferred*  [phone] [facility]) E-mail Address  [email or web address]      Emergency Contact(s)     Name Relation Home Work Mobile    Roosevelt Spouse 6 ANGELI Rodriguez         folic acid 1 MG Tabs  Commonly known as:  Rafael Castro  Next dose due:  5/16/19 morning      Take 1 tablet (1 mg total) by mouth daily.    ANGELI Rodriguez         magnesium oxide 400 MG Tabs  Commonly known as:  MAG-OX [id number] Alfuzosin HCl ER (UROXATRAL) 24 hr tab 10 mg 05/15/19 1021 Given      [id number] Pantoprazole Sodium (PROTONIX) EC tab 40 mg 05/15/19 0513 Given      [id number] Potassium Chloride ER (K-DUR M20) CR tab 40 mEq 05/15/19 1017 Given      [id number] QUE Type Source Collected On   Blood — 05/15/19 0751          Components    Component Value Reference Range Flag Lab   Glucose 95 70 - 99 mg/dL — [facility]   Sodium 142 136 - 145 mmol/L — [facility]   Potassium 3.7 3.5 - 5.1 mmol/L [place] below his baseline. CBC was unremarkable; hemoglobin is 11, around his baseline. Urinalysis unremarkable. Attempts were made to insert a Chery catheter in the emergency room, with no success. Ultrasound of the bladder and kidneys is still pending.   Uro hard and soft palate. Eyes:  Anicteric sclerae. Pupils equal, round, and reactive to light and accommodation. Extraocular muscle movement intact. Ears, nose normal.  NECK:  Supple. No lymphadenopathy. Trachea midline. Full range of motion.     LUNGS: 1. Inpatient consult to [facility] [[id number]] ordered by ANGELI Fajardo at [address]    Report of Consultation    Adiel Carmona Patient Status:  Inpatient    1945 MRN [id number]   Loc He admitted that he has been overwhelmed with his abdominal pain and inability to urinate at times. Patient denies any homicidal or suicidal ideation.   Patient denied any auditory or visual hallucination although patient has been exhibiting response to in LORazepam (ATIVAN) injection 1 mg 1 mg Intravenous Q1H PRN   Or      LORazepam (ATIVAN) tab 2 mg 2 mg Oral Q1H PRN   Or      LORazepam (ATIVAN) injection 2 mg 2 mg Intravenous Q1H PRN   [START ON 5/9/2019] Thiamine HCl tab 100 mg 100 mg Oral Daily   multiv WBC 5.4 05/08/2019    HGB 9.7 (L) 05/08/2019    HCT 29.0 (L) 05/08/2019    .0 (L) 05/08/2019    CREATSERUM 1.76 (H) 05/08/2019    BUN 26 (H) 05/08/2019     05/08/2019     05/08/2019    K 3.9 05/08/2019     05/08/2019     05/ The patient exhibited impairment in his cognitive function. Judgment insight are impaired. Impression:   Impression:     Alcohol withdrawal with delirium. Episodic mood disorder.     Discussed risk and benefit, acknowledging the current symptom and sev Author:  Norm Massey PTA Service:  Rehab Author Type:  Physical Therapist    Filed:  5/14/2019 11:28 AM Date of Service:  5/14/2019 10:42 AM Status:  Signed    :  Norm Massey PTA (Physical Therapist)       PHYSICAL THERAPY TREATMEN Dynamic Sitting: Fair           Static Standing: Fair  Dynamic Standing: Poor +[CK. 2]    ACTIVITY TOLERANCE[CK. 1]           BP: 146/69  BP Location: Left arm  BP Method: Automatic  Patient Position: Lying[CK.2]    O2 WALK                  AM-PAC '6-Clicks' assistance level: minimum assistance with walker - rolling      Goal #2  Current Status Pt performs transfers with rolling walker a min a   Goal #3 Patient is able to ambulate 75 feet with assist device: walker - rolling at assistance level: minimum assist verbal cues and assist needed for safety. Pt unable to follow multi step commands at this time. Pt progressed bed mobility to SBA, verbal cue to take seated rest break at EOB prior to transfer.  Pt performed transfer with rolling walker at MIN A, verbal PAIN ASSESSMENT   Ratin  Management Techniques: Activity promotion; Body mechanics; Relaxation;Repositioning    BALANCE                                                                                                                     Static Sitting: assistance      Goal #1   Current Status Pt performs bed mobility at [facility]   Goal #2 Patient is able to demonstrate transfers Sit to/from Stand at assistance level: minimum assistance with walker - rolling      Goal #2  Current Status Pt performs transfers cognition, decreased activity tolerance. These deficits manifest functionally while performing ADLs and mobility.    The patient is below baseline and would benefit from skilled inpatient OT to address the above deficits, maximizing patient's ability to re Episodic mood disorder (HCC)[AO.2]      Past Medical History[AO.1]  Past Medical History:   Diagnosis Date   • Alcohol dependence ([facility]    • Carotid artery disease ([facility]     per NG   • Cataract     cataract surgery    • Coronary artery disease     CABG; AM-PAC ‘6-Clicks’ Inpatient Daily Activity Short Form  How much help from another person does the patient currently need…[AO.1]  -   Putting on and taking off regular lower body clothing?: A Lot  -   Bathing (including washing, rinsing, drying)?: A Lot  - No notes of this type exist for this encounter. SLP Notes    No notes of this type exist for this encounter.      Immunizations     Name Date      DT 11/01/17     INFLUENZA 11/01/16     INFLUENZA 10/01/15     Pneumococcal (Prevnar 13) 03/05/13     Pneum

## (undated) NOTE — LETTER
4/9/2018              [address] [address]         Dear Jameel Herring,    Our records indicate that the tests ordered for you by Yael Aj MD  have not been done.   If you have, in fact, already completed the test

## (undated) NOTE — IP AVS SNAPSHOT
[facility]            (For Outpatient Use Only) Initial Admit Date: 2022   Inpt/Obs Admit Date: Inpt: N/A / Obs: 22   Discharge Date:    Eva José Miguel:  [de-identified]   MRN: [de-identified]   CSN: [id number]   CEID: [id number]        ENCOUNTER  Patient Class: Observation Admitting Provider: Devaughn Rodgers MD Unit: [address] Service: Cardiac Telemetry Attending Provider: Nat Lima MD   Bed: 318-A   Visit Type:   Referring Physician: No ref. provider found Billing Flag:    Admit Diagnosis: Chest pain with moderate risk for cardiac etiology [R07.9]      PATIENT  Legal Name:   Grecia Raymundo   Legal Sex: Male  Gender ID:              Pref Name:   Sandy Ramos PCP:  Lisa Peng DO Home: [address]   Address:  [facility] : 1945 (77 yrs) Mobile: [phone]         City/State/Zip: [facility] [address] Marital:  Language: Cristela Barry: [place] SSN4: [id number] Church: [employer] - No  *     Race: White Ethnicity: Non  Or [address]   Name Relationship Legal Guardian? Home Phone Work Phone Mobile Phone   1.  Flavia Keith  2. *No Contact Specified* Spouse            [phone]       GUARANTOR  Guarantor: Tim Dawn : 1945 Home Phone: [phone]   Address: [facility]  Sex: Male Work Phone: [phone]   City/State/Zip: [place]   Rel. to Patient: Self Guarantor ID: [id number]   [address]   Employer:  Status: RETIRED     COVERAGE  PRIMARY INSURANCE   Payor: MEDICARE Plan: MEDICARE PART A&B   Group Number:  Insurance Type: Carole 855 Name: Tim Dawn Subscriber : 1945   Subscriber ID: [id number] Pt Rel to Subscriber: Self   SECONDARY INSURANCE   [place] Plan: [place]   Group Number: [id number] Insurance Type: Carole 855 Name: Tim Dawn Subscriber : 1945   Subscriber ID: [id number] Pt Rel to Subscriber: SELF   TERTIARY INSURANCE   Payor:  Plan:    Group Number:  Insurance Type:    Subscriber Name:  Subscriber :    Subscriber ID:  Pt Rel to Subscriber:    Hospital Account Financial Class: Medicare    December 10, 2022

## (undated) NOTE — LETTER
8/6/2019              [facility]         Dear Jack Goff,    Our records indicate that the test, Stress Test, ordered for you by Kandi Connors MD  has not been done.   If you have, in fact, already comple